# Patient Record
Sex: MALE | Race: BLACK OR AFRICAN AMERICAN | Employment: UNEMPLOYED | URBAN - METROPOLITAN AREA
[De-identification: names, ages, dates, MRNs, and addresses within clinical notes are randomized per-mention and may not be internally consistent; named-entity substitution may affect disease eponyms.]

---

## 2017-01-30 ENCOUNTER — OFFICE VISIT (OUTPATIENT)
Dept: INTERNAL MEDICINE CLINIC | Age: 62
End: 2017-01-30

## 2017-01-30 VITALS
SYSTOLIC BLOOD PRESSURE: 124 MMHG | OXYGEN SATURATION: 99 % | DIASTOLIC BLOOD PRESSURE: 80 MMHG | BODY MASS INDEX: 30.38 KG/M2 | HEART RATE: 92 BPM | WEIGHT: 194 LBS

## 2017-01-30 DIAGNOSIS — H00.011 HORDEOLUM EXTERNUM OF RIGHT UPPER EYELID: ICD-10-CM

## 2017-01-30 DIAGNOSIS — H65.23 BILATERAL CHRONIC SEROUS OTITIS MEDIA: Primary | ICD-10-CM

## 2017-01-30 DIAGNOSIS — H93.11 RIGHT-SIDED TINNITUS: ICD-10-CM

## 2017-01-30 DIAGNOSIS — Z12.11 COLON CANCER SCREENING: ICD-10-CM

## 2017-01-30 DIAGNOSIS — Z13.9 SCREENING: ICD-10-CM

## 2017-01-30 LAB
A/G RATIO: 1.7 (ref 1.1–2.2)
ALBUMIN SERPL-MCNC: 4.5 G/DL (ref 3.4–5)
ALP BLD-CCNC: 111 U/L (ref 40–129)
ALT SERPL-CCNC: 38 U/L (ref 10–40)
ANION GAP SERPL CALCULATED.3IONS-SCNC: 16 MMOL/L (ref 3–16)
AST SERPL-CCNC: 23 U/L (ref 15–37)
BILIRUB SERPL-MCNC: 0.5 MG/DL (ref 0–1)
BUN BLDV-MCNC: 11 MG/DL (ref 7–20)
CALCIUM SERPL-MCNC: 9.4 MG/DL (ref 8.3–10.6)
CHLORIDE BLD-SCNC: 103 MMOL/L (ref 99–110)
CHOLESTEROL, TOTAL: 187 MG/DL (ref 0–199)
CO2: 25 MMOL/L (ref 21–32)
CREAT SERPL-MCNC: 1.1 MG/DL (ref 0.8–1.3)
GFR AFRICAN AMERICAN: >60
GFR NON-AFRICAN AMERICAN: >60
GLOBULIN: 2.7 G/DL
GLUCOSE BLD-MCNC: 93 MG/DL (ref 70–99)
HDLC SERPL-MCNC: 45 MG/DL (ref 40–60)
HEPATITIS C ANTIBODY INTERPRETATION: NORMAL
LDL CHOLESTEROL CALCULATED: 115 MG/DL
POTASSIUM SERPL-SCNC: 4.4 MMOL/L (ref 3.5–5.1)
SODIUM BLD-SCNC: 144 MMOL/L (ref 136–145)
TOTAL PROTEIN: 7.2 G/DL (ref 6.4–8.2)
TRIGL SERPL-MCNC: 136 MG/DL (ref 0–150)
VLDLC SERPL CALC-MCNC: 27 MG/DL

## 2017-01-30 PROCEDURE — 99214 OFFICE O/P EST MOD 30 MIN: CPT | Performed by: INTERNAL MEDICINE

## 2017-01-30 RX ORDER — AMOXICILLIN AND CLAVULANATE POTASSIUM 875; 125 MG/1; MG/1
1 TABLET, FILM COATED ORAL 2 TIMES DAILY
Qty: 20 TABLET | Refills: 0 | Status: SHIPPED | OUTPATIENT
Start: 2017-01-30 | End: 2017-02-09

## 2017-01-30 ASSESSMENT — ENCOUNTER SYMPTOMS
VISUAL CHANGE: 0
NAUSEA: 0

## 2017-01-31 LAB — HIV-1 AND HIV-2 ANTIBODIES: NORMAL

## 2017-02-20 ENCOUNTER — OFFICE VISIT (OUTPATIENT)
Dept: INTERNAL MEDICINE CLINIC | Age: 62
End: 2017-02-20

## 2017-02-20 VITALS
SYSTOLIC BLOOD PRESSURE: 126 MMHG | OXYGEN SATURATION: 98 % | HEART RATE: 80 BPM | DIASTOLIC BLOOD PRESSURE: 84 MMHG | WEIGHT: 196 LBS | BODY MASS INDEX: 30.7 KG/M2

## 2017-02-20 DIAGNOSIS — H93.8X3 EAR FULLNESS, BILATERAL: Primary | ICD-10-CM

## 2017-02-20 PROCEDURE — 99213 OFFICE O/P EST LOW 20 MIN: CPT | Performed by: INTERNAL MEDICINE

## 2017-03-06 ENCOUNTER — TELEPHONE (OUTPATIENT)
Dept: INTERNAL MEDICINE CLINIC | Age: 62
End: 2017-03-06

## 2017-03-07 RX ORDER — AMOXICILLIN 875 MG/1
875 TABLET, COATED ORAL 2 TIMES DAILY
Qty: 14 TABLET | Refills: 0 | Status: SHIPPED | OUTPATIENT
Start: 2017-03-07 | End: 2017-03-14

## 2017-03-23 ENCOUNTER — OFFICE VISIT (OUTPATIENT)
Dept: INTERNAL MEDICINE CLINIC | Age: 62
End: 2017-03-23

## 2017-03-23 VITALS
HEART RATE: 97 BPM | OXYGEN SATURATION: 99 % | WEIGHT: 197 LBS | DIASTOLIC BLOOD PRESSURE: 78 MMHG | BODY MASS INDEX: 30.85 KG/M2 | SYSTOLIC BLOOD PRESSURE: 128 MMHG

## 2017-03-23 DIAGNOSIS — H65.21 RIGHT CHRONIC SEROUS OTITIS MEDIA: Primary | ICD-10-CM

## 2017-03-23 DIAGNOSIS — Z12.11 COLON CANCER SCREENING: ICD-10-CM

## 2017-03-23 PROCEDURE — 92551 PURE TONE HEARING TEST AIR: CPT | Performed by: INTERNAL MEDICINE

## 2017-03-23 PROCEDURE — 99213 OFFICE O/P EST LOW 20 MIN: CPT | Performed by: INTERNAL MEDICINE

## 2017-03-27 ENCOUNTER — TELEPHONE (OUTPATIENT)
Dept: INTERNAL MEDICINE CLINIC | Age: 62
End: 2017-03-27

## 2017-05-02 ENCOUNTER — OFFICE VISIT (OUTPATIENT)
Dept: ENT CLINIC | Age: 62
End: 2017-05-02

## 2017-05-02 VITALS
HEART RATE: 87 BPM | HEIGHT: 68 IN | WEIGHT: 190 LBS | BODY MASS INDEX: 28.79 KG/M2 | SYSTOLIC BLOOD PRESSURE: 122 MMHG | DIASTOLIC BLOOD PRESSURE: 77 MMHG

## 2017-05-02 DIAGNOSIS — H93.A1 PULSATILE TINNITUS OF RIGHT EAR: Primary | ICD-10-CM

## 2017-05-02 PROCEDURE — 99243 OFF/OP CNSLTJ NEW/EST LOW 30: CPT | Performed by: OTOLARYNGOLOGY

## 2017-05-26 ENCOUNTER — TELEPHONE (OUTPATIENT)
Dept: ENT CLINIC | Age: 62
End: 2017-05-26

## 2017-05-26 DIAGNOSIS — H93.A9 PULSATILE TINNITUS: Primary | ICD-10-CM

## 2017-05-31 ENCOUNTER — HOSPITAL ENCOUNTER (OUTPATIENT)
Dept: MRI IMAGING | Age: 62
Discharge: HOME | End: 2017-05-31
Attending: OTOLARYNGOLOGY | Admitting: OTOLARYNGOLOGY

## 2017-05-31 DIAGNOSIS — H93.13 TINNITUS OF BOTH EARS: ICD-10-CM

## 2017-05-31 DIAGNOSIS — H93.A1 PULSATILE TINNITUS OF RIGHT EAR: ICD-10-CM

## 2017-05-31 LAB
BUN BLDV-MCNC: 10 MG/DL (ref 7–20)
CREAT SERPL-MCNC: 1.1 MG/DL (ref 0.8–1.3)
GFR AFRICAN AMERICAN: >60
GFR NON-AFRICAN AMERICAN: >60

## 2017-05-31 RX ORDER — SODIUM CHLORIDE 0.9 % (FLUSH) 0.9 %
10 SYRINGE (ML) INJECTION ONCE
Status: COMPLETED | OUTPATIENT
Start: 2017-05-31 | End: 2017-05-31

## 2017-05-31 RX ADMIN — Medication 10 ML: at 13:05

## 2017-06-23 ENCOUNTER — OFFICE VISIT (OUTPATIENT)
Dept: INTERNAL MEDICINE CLINIC | Age: 62
End: 2017-06-23

## 2017-06-23 VITALS
DIASTOLIC BLOOD PRESSURE: 72 MMHG | SYSTOLIC BLOOD PRESSURE: 128 MMHG | WEIGHT: 185 LBS | OXYGEN SATURATION: 98 % | BODY MASS INDEX: 28.13 KG/M2 | HEART RATE: 90 BPM

## 2017-06-23 DIAGNOSIS — J34.9 SINUS DISEASE: ICD-10-CM

## 2017-06-23 DIAGNOSIS — H93.A1 PULSATILE TINNITUS OF RIGHT EAR: Primary | ICD-10-CM

## 2017-06-23 PROCEDURE — 99213 OFFICE O/P EST LOW 20 MIN: CPT | Performed by: INTERNAL MEDICINE

## 2017-06-23 ASSESSMENT — ENCOUNTER SYMPTOMS
VOMITING: 0
EYE PAIN: 0
STRIDOR: 0
WHEEZING: 0
CONSTIPATION: 0
PHOTOPHOBIA: 0
BLOOD IN STOOL: 0
NAUSEA: 0
EYE REDNESS: 0
SORE THROAT: 0
CHEST TIGHTNESS: 0
FACIAL SWELLING: 0
EYE DISCHARGE: 0
RESPIRATORY NEGATIVE: 1
RHINORRHEA: 0
ALLERGIC/IMMUNOLOGIC NEGATIVE: 1
ABDOMINAL PAIN: 0
SINUS PRESSURE: 0
ANAL BLEEDING: 0
DIARRHEA: 0
BACK PAIN: 0
ABDOMINAL DISTENTION: 0
EYE ITCHING: 0
TROUBLE SWALLOWING: 0
SHORTNESS OF BREATH: 0
APNEA: 0
VOICE CHANGE: 0
COUGH: 0

## 2017-07-17 ENCOUNTER — OFFICE VISIT (OUTPATIENT)
Dept: ENT CLINIC | Age: 62
End: 2017-07-17

## 2017-07-17 VITALS
BODY MASS INDEX: 28.79 KG/M2 | WEIGHT: 190 LBS | HEIGHT: 68 IN | DIASTOLIC BLOOD PRESSURE: 84 MMHG | HEART RATE: 81 BPM | SYSTOLIC BLOOD PRESSURE: 135 MMHG

## 2017-07-17 DIAGNOSIS — H93.A1 PULSATILE TINNITUS OF RIGHT EAR: Primary | ICD-10-CM

## 2017-07-17 PROCEDURE — 99213 OFFICE O/P EST LOW 20 MIN: CPT | Performed by: OTOLARYNGOLOGY

## 2017-07-25 ENCOUNTER — OFFICE VISIT (OUTPATIENT)
Dept: ENT CLINIC | Age: 62
End: 2017-07-25

## 2017-07-25 DIAGNOSIS — H93.11 RIGHT-SIDED TINNITUS: Primary | ICD-10-CM

## 2017-07-25 PROCEDURE — 92550 TYMPANOMETRY & REFLEX THRESH: CPT | Performed by: AUDIOLOGIST

## 2017-07-25 PROCEDURE — 92557 COMPREHENSIVE HEARING TEST: CPT | Performed by: AUDIOLOGIST

## 2017-08-21 ENCOUNTER — HOSPITAL ENCOUNTER (OUTPATIENT)
Dept: MRI IMAGING | Age: 62
Discharge: HOME | End: 2017-08-21
Admitting: OTOLARYNGOLOGY

## 2017-08-21 DIAGNOSIS — H93.11 TINNITUS OF RIGHT EAR: ICD-10-CM

## 2017-08-21 DIAGNOSIS — H93.A1 PULSATILE TINNITUS OF RIGHT EAR: ICD-10-CM

## 2017-12-21 ENCOUNTER — TELEPHONE (OUTPATIENT)
Dept: ENT CLINIC | Age: 62
End: 2017-12-21

## 2017-12-21 NOTE — TELEPHONE ENCOUNTER
Patient last seen 7/17 -- Had audio done on 7/25 -- Had MRA done on 8/21 -- Patient seen for Pulsatile Tinnitus (r) ear    Patient states that he is still having this issue and would like to know what test results show. Patient scheduled follow up appointment for 1/26/18 but would like results and recommendations.     Patient @ [phone]    Also advised that I would place on wait list for sooner appointment

## 2017-12-22 NOTE — TELEPHONE ENCOUNTER
Left vm that Dr. Manny Callahan ask that we call with results - patient to contact office for results

## 2018-01-25 ENCOUNTER — OFFICE VISIT (OUTPATIENT)
Dept: INTERNAL MEDICINE CLINIC | Age: 63
End: 2018-01-25

## 2018-01-25 VITALS
HEART RATE: 90 BPM | BODY MASS INDEX: 29.04 KG/M2 | OXYGEN SATURATION: 98 % | SYSTOLIC BLOOD PRESSURE: 118 MMHG | DIASTOLIC BLOOD PRESSURE: 74 MMHG | WEIGHT: 191 LBS

## 2018-01-25 DIAGNOSIS — Z12.11 COLON CANCER SCREENING: ICD-10-CM

## 2018-01-25 DIAGNOSIS — H93.11 RIGHT-SIDED TINNITUS: Primary | ICD-10-CM

## 2018-01-25 DIAGNOSIS — Z12.5 SCREENING PSA (PROSTATE SPECIFIC ANTIGEN): ICD-10-CM

## 2018-01-25 DIAGNOSIS — Z13.220 SCREENING CHOLESTEROL LEVEL: ICD-10-CM

## 2018-01-25 PROCEDURE — 99214 OFFICE O/P EST MOD 30 MIN: CPT | Performed by: INTERNAL MEDICINE

## 2018-01-26 ENCOUNTER — OFFICE VISIT (OUTPATIENT)
Dept: ENT CLINIC | Age: 63
End: 2018-01-26

## 2018-01-26 VITALS — HEART RATE: 85 BPM | DIASTOLIC BLOOD PRESSURE: 78 MMHG | SYSTOLIC BLOOD PRESSURE: 128 MMHG

## 2018-01-26 DIAGNOSIS — H90.3 BILATERAL HIGH FREQUENCY SENSORINEURAL HEARING LOSS: ICD-10-CM

## 2018-01-26 DIAGNOSIS — H93.11 RIGHT-SIDED TINNITUS: Primary | Chronic | ICD-10-CM

## 2018-01-26 DIAGNOSIS — H93.A1 PULSATILE TINNITUS OF RIGHT EAR: Chronic | ICD-10-CM

## 2018-01-26 PROCEDURE — 99214 OFFICE O/P EST MOD 30 MIN: CPT | Performed by: OTOLARYNGOLOGY

## 2018-01-26 ASSESSMENT — ENCOUNTER SYMPTOMS
COUGH: 0
CHOKING: 0
EYE PAIN: 0
EYE ITCHING: 0

## 2018-01-26 NOTE — PROGRESS NOTES
HISTORY:   ORDERING PHYSICIAN PROVIDED HISTORY: Pulsatile tinnitus of right ear   TECHNOLOGIST PROVIDED HISTORY:   Technologist Provided Reason for Exam: PT HAVING MRA HEAD AND NECK WITHOUT   CONTRAST ONLY TODAY   Acuity: Acute   Type of Encounter: Initial   Additional signs and symptoms: PT HAVING MRA HEAD AND NECK WITHOUT CONTRAST   ONLY TODAY   Relevant Medical/Surgical History: PT HAVING MRA HEAD AND NECK WITHOUT   CONTRAST ONLY TODAY       FINDINGS:   AORTIC ARCH/GREAT VESSELS: The aortic arch demonstrates three-vessel origin. There is no gross evidence of occlusion within limitations of motion artifact.       CAROTID ARTERIES: The common carotid arteries are normal in appearance   without evidence of a flow limiting stenosis. The internal carotid arteries   are normal in appearance without evidence of a flow limiting stenosis by   NASCET criteria.       VERTEBRAL ARTERIES: The vertebral arteries both arise from the subclavian   arteries and are normal in caliber without evidence of flow limiting stenosis.         Impression   Unremarkable exam.  No evidence of flow-limiting stenosis or vascular lesion.             COUNSELING:    Audiogram results were reviewed and discussed with Magdi. I advised of type of hearing loss, probable etiology, possible associated impairment, need for noise protection and avoidance, etiology of tinnitus and treatment/coping measures including masking techniques, and need for annual and prn hearing tests. Patient was advised of the difficulty understanding speech in the presence of moderate to high volume background noise associated with this hearing loss. IMPRESSION / Glenys Delgado / Marifer Bro / PROCEDURES:       Cherie Gomes was seen today for tinnitus.     Diagnoses and all orders for this visit:    Right-sided tinnitus  Comments:  no evidence of acoustic neuroma    Pulsatile tinnitus of right ear  Comments:  no evidence of glomus vagale, jugulare, or tympanicum; no evidence of vascular tumor or malformation or aneurysm. Bilateral high frequency sensorineural hearing loss  Comments:  mild, bilateral, symmetrical         RECOMMENDATIONS / PLAN:    1. See Patient Instructions  2. Return for Annual hearing test, or any further Ear Nose Throat or Sinus problems. TIME:  I spent a total of 25 minutes with this patient; counseling time = 20 minutes. More than 50% of the visit was spent counseling and/or coordination of care.

## 2018-01-26 NOTE — PROGRESS NOTES
He has no wheezes. He has no rales. Abdominal: Soft. He exhibits no distension. Genitourinary: Rectal exam shows no fissure, no mass and no tenderness. Prostate is enlarged. Prostate is not tender. Assessment/Plan:  Albert Gaspar was seen today for annual exam.    Diagnoses and all orders for this visit:    Right-sided tinnitus    Colon cancer screening  -     Ambulatory referral to Colonoscopy; Future  -     polyethylene glycol (GOLYTELY) 236 g solution; Take 4,000 mLs by mouth once for 1 dose    Screening cholesterol level  -     Comprehensive Metabolic Panel; Future  -     Lipid Panel; Future    Screening PSA (prostate specific antigen)  -     PSA PROSTATIC SPECIFIC ANTIGEN; Future  -     URINALYSIS WITH MICROSCOPIC; Future      Return in about 6 months (around 7/25/2018) for tinnitus.

## 2018-03-06 ENCOUNTER — TELEPHONE (OUTPATIENT)
Dept: SURGERY | Age: 63
End: 2018-03-06

## 2018-03-08 NOTE — TELEPHONE ENCOUNTER
Patient is requesting a return call to schedule a colonoscopy. Patient states he would like it scheduled sooner rather than later. Please advise. Thank you!     Thomas Carter [phone] (home)

## 2018-03-09 DIAGNOSIS — Z12.11 COLON CANCER SCREENING: Primary | ICD-10-CM

## 2018-03-13 ENCOUNTER — TELEPHONE (OUTPATIENT)
Dept: SURGERY | Age: 63
End: 2018-03-13

## 2018-03-14 RX ORDER — SODIUM CHLORIDE 0.9 % (FLUSH) 0.9 %
10 SYRINGE (ML) INJECTION EVERY 12 HOURS SCHEDULED
Status: CANCELLED | OUTPATIENT
Start: 2018-03-14

## 2018-03-14 RX ORDER — SODIUM CHLORIDE 0.9 % (FLUSH) 0.9 %
10 SYRINGE (ML) INJECTION PRN
Status: CANCELLED | OUTPATIENT
Start: 2018-03-14

## 2018-03-14 RX ORDER — SODIUM CHLORIDE 9 MG/ML
INJECTION, SOLUTION INTRAVENOUS CONTINUOUS
Status: CANCELLED | OUTPATIENT
Start: 2018-03-14

## 2018-03-15 NOTE — TELEPHONE ENCOUNTER
Patient currently not covered under spouse's insurance plan. Colonoscopy rescheduled for 5/4/18 @ 1030. [facility] scheduling informed.

## 2018-03-16 ENCOUNTER — HOSPITAL ENCOUNTER (OUTPATIENT)
Dept: ENDOSCOPY | Age: 63
Discharge: HOME | End: 2018-03-17
Attending: SURGERY | Admitting: SURGERY

## 2018-05-04 ENCOUNTER — HOSPITAL ENCOUNTER (OUTPATIENT)
Dept: ENDOSCOPY | Age: 63
Discharge: HOME | End: 2018-05-04
Admitting: SURGERY

## 2018-05-04 VITALS
OXYGEN SATURATION: 100 % | HEIGHT: 67 IN | SYSTOLIC BLOOD PRESSURE: 113 MMHG | RESPIRATION RATE: 15 BRPM | TEMPERATURE: 98.3 F | WEIGHT: 185 LBS | DIASTOLIC BLOOD PRESSURE: 71 MMHG | BODY MASS INDEX: 29.03 KG/M2 | HEART RATE: 77 BPM

## 2018-05-04 PROCEDURE — 45378 DIAGNOSTIC COLONOSCOPY: CPT | Performed by: SURGERY

## 2018-05-04 RX ORDER — SODIUM CHLORIDE 0.9 % (FLUSH) 0.9 %
10 SYRINGE (ML) INJECTION PRN
Status: DISCONTINUED | OUTPATIENT
Start: 2018-05-04 | End: 2018-05-05 | Stop reason: HOSPADM

## 2018-05-04 RX ORDER — SODIUM CHLORIDE 9 MG/ML
INJECTION, SOLUTION INTRAVENOUS CONTINUOUS
Status: DISCONTINUED | OUTPATIENT
Start: 2018-05-04 | End: 2018-05-05 | Stop reason: HOSPADM

## 2018-05-04 RX ORDER — SODIUM CHLORIDE 0.9 % (FLUSH) 0.9 %
10 SYRINGE (ML) INJECTION EVERY 12 HOURS SCHEDULED
Status: DISCONTINUED | OUTPATIENT
Start: 2018-05-04 | End: 2018-05-05 | Stop reason: HOSPADM

## 2018-05-04 RX ADMIN — SODIUM CHLORIDE: 9 INJECTION, SOLUTION INTRAVENOUS at 09:43

## 2018-05-04 ASSESSMENT — ENCOUNTER SYMPTOMS: SHORTNESS OF BREATH: 0

## 2018-05-04 ASSESSMENT — PAIN - FUNCTIONAL ASSESSMENT: PAIN_FUNCTIONAL_ASSESSMENT: 0-10

## 2019-01-15 ENCOUNTER — OFFICE VISIT (OUTPATIENT)
Dept: INTERNAL MEDICINE CLINIC | Age: 64
End: 2019-01-15
Payer: MEDICAID

## 2019-01-15 VITALS
WEIGHT: 187 LBS | HEART RATE: 80 BPM | BODY MASS INDEX: 29.29 KG/M2 | DIASTOLIC BLOOD PRESSURE: 82 MMHG | SYSTOLIC BLOOD PRESSURE: 122 MMHG

## 2019-01-15 DIAGNOSIS — D22.9 CHANGE IN MOLE: ICD-10-CM

## 2019-01-15 DIAGNOSIS — M79.10 MYALGIA: Primary | ICD-10-CM

## 2019-01-15 DIAGNOSIS — R35.0 URINARY FREQUENCY: ICD-10-CM

## 2019-01-15 LAB
A/G RATIO: 1.6 (ref 1.1–2.2)
ALBUMIN SERPL-MCNC: 4.3 G/DL (ref 3.4–5)
ALP BLD-CCNC: 110 U/L (ref 40–129)
ALT SERPL-CCNC: 19 U/L (ref 10–40)
ANION GAP SERPL CALCULATED.3IONS-SCNC: 12 MMOL/L (ref 3–16)
AST SERPL-CCNC: 17 U/L (ref 15–37)
BASOPHILS ABSOLUTE: 0 K/UL (ref 0–0.2)
BASOPHILS RELATIVE PERCENT: 0.8 %
BILIRUB SERPL-MCNC: 0.4 MG/DL (ref 0–1)
BUN BLDV-MCNC: 13 MG/DL (ref 7–20)
C-REACTIVE PROTEIN: 5.2 MG/L (ref 0–5.1)
CALCIUM SERPL-MCNC: 9.4 MG/DL (ref 8.3–10.6)
CHLORIDE BLD-SCNC: 106 MMOL/L (ref 99–110)
CO2: 26 MMOL/L (ref 21–32)
CREAT SERPL-MCNC: 1 MG/DL (ref 0.8–1.3)
EOSINOPHILS ABSOLUTE: 0 K/UL (ref 0–0.6)
EOSINOPHILS RELATIVE PERCENT: 0.7 %
FERRITIN: 73.5 NG/ML (ref 30–400)
GFR AFRICAN AMERICAN: >60
GFR NON-AFRICAN AMERICAN: >60
GLOBULIN: 2.7 G/DL
GLUCOSE BLD-MCNC: 150 MG/DL (ref 70–99)
HCT VFR BLD CALC: 41.1 % (ref 40.5–52.5)
HEMOGLOBIN: 13.6 G/DL (ref 13.5–17.5)
LYMPHOCYTES ABSOLUTE: 1.9 K/UL (ref 1–5.1)
LYMPHOCYTES RELATIVE PERCENT: 32.6 %
MCH RBC QN AUTO: 30.8 PG (ref 26–34)
MCHC RBC AUTO-ENTMCNC: 33.2 G/DL (ref 31–36)
MCV RBC AUTO: 92.7 FL (ref 80–100)
MONOCYTES ABSOLUTE: 0.4 K/UL (ref 0–1.3)
MONOCYTES RELATIVE PERCENT: 7.2 %
NEUTROPHILS ABSOLUTE: 3.4 K/UL (ref 1.7–7.7)
NEUTROPHILS RELATIVE PERCENT: 58.7 %
PDW BLD-RTO: 13.8 % (ref 12.4–15.4)
PLATELET # BLD: 280 K/UL (ref 135–450)
PMV BLD AUTO: 8.5 FL (ref 5–10.5)
POTASSIUM SERPL-SCNC: 4.3 MMOL/L (ref 3.5–5.1)
PROSTATE SPECIFIC ANTIGEN: 4.3 NG/ML (ref 0–4)
RBC # BLD: 4.44 M/UL (ref 4.2–5.9)
SEDIMENTATION RATE, ERYTHROCYTE: 28 MM/HR (ref 0–20)
SODIUM BLD-SCNC: 144 MMOL/L (ref 136–145)
TOTAL PROTEIN: 7 G/DL (ref 6.4–8.2)
TSH SERPL DL<=0.05 MIU/L-ACNC: 0.86 UIU/ML (ref 0.27–4.2)
WBC # BLD: 5.8 K/UL (ref 4–11)

## 2019-01-15 PROCEDURE — 99213 OFFICE O/P EST LOW 20 MIN: CPT | Performed by: INTERNAL MEDICINE

## 2019-01-16 LAB
ESTIMATED AVERAGE GLUCOSE: 134.1 MG/DL
HBA1C MFR BLD: 6.3 %

## 2019-01-16 ASSESSMENT — ENCOUNTER SYMPTOMS
COUGH: 0
EYE PAIN: 0
CHOKING: 0
EYE REDNESS: 0

## 2019-01-24 ENCOUNTER — TELEPHONE (OUTPATIENT)
Dept: INTERNAL MEDICINE CLINIC | Age: 64
End: 2019-01-24

## 2019-01-31 ENCOUNTER — OFFICE VISIT (OUTPATIENT)
Dept: INTERNAL MEDICINE CLINIC | Age: 64
End: 2019-01-31
Payer: MEDICAID

## 2019-01-31 VITALS
SYSTOLIC BLOOD PRESSURE: 120 MMHG | DIASTOLIC BLOOD PRESSURE: 70 MMHG | WEIGHT: 190 LBS | HEART RATE: 76 BPM | BODY MASS INDEX: 29.76 KG/M2

## 2019-01-31 DIAGNOSIS — R97.20 ELEVATED PSA: ICD-10-CM

## 2019-01-31 DIAGNOSIS — R20.0 BILATERAL HAND NUMBNESS: ICD-10-CM

## 2019-01-31 DIAGNOSIS — N50.82 SCROTAL PAIN: Primary | ICD-10-CM

## 2019-01-31 PROCEDURE — 99213 OFFICE O/P EST LOW 20 MIN: CPT | Performed by: INTERNAL MEDICINE

## 2019-02-04 ENCOUNTER — PROCEDURE VISIT (OUTPATIENT)
Dept: NEUROLOGY | Age: 64
End: 2019-02-04
Payer: MEDICAID

## 2019-02-04 DIAGNOSIS — G56.23 ULNAR NEUROPATHY OF BOTH UPPER EXTREMITIES: Primary | ICD-10-CM

## 2019-02-04 PROCEDURE — 95886 MUSC TEST DONE W/N TEST COMP: CPT | Performed by: PSYCHIATRY & NEUROLOGY

## 2019-02-04 PROCEDURE — 95911 NRV CNDJ TEST 9-10 STUDIES: CPT | Performed by: PSYCHIATRY & NEUROLOGY

## 2019-02-08 ENCOUNTER — TELEPHONE (OUTPATIENT)
Dept: INTERNAL MEDICINE CLINIC | Age: 64
End: 2019-02-08

## 2019-02-28 ENCOUNTER — OFFICE VISIT (OUTPATIENT)
Dept: INTERNAL MEDICINE CLINIC | Age: 64
End: 2019-02-28
Payer: MEDICAID

## 2019-02-28 VITALS
WEIGHT: 184 LBS | DIASTOLIC BLOOD PRESSURE: 70 MMHG | OXYGEN SATURATION: 97 % | SYSTOLIC BLOOD PRESSURE: 130 MMHG | HEART RATE: 88 BPM | HEIGHT: 66 IN | BODY MASS INDEX: 29.57 KG/M2

## 2019-02-28 DIAGNOSIS — M25.561 ACUTE PAIN OF RIGHT KNEE: ICD-10-CM

## 2019-02-28 DIAGNOSIS — M25.562 ACUTE PAIN OF LEFT KNEE: ICD-10-CM

## 2019-02-28 DIAGNOSIS — G56.23 ULNAR NEUROPATHY OF BOTH UPPER EXTREMITIES: Primary | ICD-10-CM

## 2019-02-28 PROCEDURE — 3017F COLORECTAL CA SCREEN DOC REV: CPT | Performed by: INTERNAL MEDICINE

## 2019-02-28 PROCEDURE — G8427 DOCREV CUR MEDS BY ELIG CLIN: HCPCS | Performed by: INTERNAL MEDICINE

## 2019-02-28 PROCEDURE — G8484 FLU IMMUNIZE NO ADMIN: HCPCS | Performed by: INTERNAL MEDICINE

## 2019-02-28 PROCEDURE — 99213 OFFICE O/P EST LOW 20 MIN: CPT | Performed by: INTERNAL MEDICINE

## 2019-02-28 PROCEDURE — G8419 CALC BMI OUT NRM PARAM NOF/U: HCPCS | Performed by: INTERNAL MEDICINE

## 2019-02-28 PROCEDURE — 1036F TOBACCO NON-USER: CPT | Performed by: INTERNAL MEDICINE

## 2019-02-28 ASSESSMENT — PATIENT HEALTH QUESTIONNAIRE - PHQ9
SUM OF ALL RESPONSES TO PHQ QUESTIONS 1-9: 0
1. LITTLE INTEREST OR PLEASURE IN DOING THINGS: 0
SUM OF ALL RESPONSES TO PHQ QUESTIONS 1-9: 0
2. FEELING DOWN, DEPRESSED OR HOPELESS: 0
SUM OF ALL RESPONSES TO PHQ9 QUESTIONS 1 & 2: 0

## 2019-12-10 ENCOUNTER — TELEPHONE (OUTPATIENT)
Dept: PRIMARY CARE CLINIC | Age: 64
End: 2019-12-10

## 2019-12-10 DIAGNOSIS — Z13.220 SCREENING FOR CHOLESTEROL LEVEL: Primary | ICD-10-CM

## 2019-12-10 DIAGNOSIS — Z12.5 SCREENING PSA (PROSTATE SPECIFIC ANTIGEN): ICD-10-CM

## 2020-01-17 ENCOUNTER — OFFICE VISIT (OUTPATIENT)
Dept: INTERNAL MEDICINE CLINIC | Age: 65
End: 2020-01-17
Payer: MEDICAID

## 2020-01-17 VITALS
OXYGEN SATURATION: 98 % | SYSTOLIC BLOOD PRESSURE: 122 MMHG | BODY MASS INDEX: 29.92 KG/M2 | WEIGHT: 184 LBS | HEART RATE: 77 BPM | DIASTOLIC BLOOD PRESSURE: 86 MMHG

## 2020-01-17 DIAGNOSIS — Z13.220 SCREENING FOR CHOLESTEROL LEVEL: ICD-10-CM

## 2020-01-17 DIAGNOSIS — Z12.5 SCREENING PSA (PROSTATE SPECIFIC ANTIGEN): ICD-10-CM

## 2020-01-17 LAB
A/G RATIO: 1.5 (ref 1.1–2.2)
ALBUMIN SERPL-MCNC: 4.5 G/DL (ref 3.4–5)
ALP BLD-CCNC: 104 U/L (ref 40–129)
ALT SERPL-CCNC: 22 U/L (ref 10–40)
ANION GAP SERPL CALCULATED.3IONS-SCNC: 13 MMOL/L (ref 3–16)
AST SERPL-CCNC: 18 U/L (ref 15–37)
BILIRUB SERPL-MCNC: 0.8 MG/DL (ref 0–1)
BUN BLDV-MCNC: 12 MG/DL (ref 7–20)
CALCIUM SERPL-MCNC: 9.7 MG/DL (ref 8.3–10.6)
CHLORIDE BLD-SCNC: 103 MMOL/L (ref 99–110)
CHOLESTEROL, TOTAL: 199 MG/DL (ref 0–199)
CO2: 27 MMOL/L (ref 21–32)
CREAT SERPL-MCNC: 1 MG/DL (ref 0.8–1.3)
GFR AFRICAN AMERICAN: >60
GFR NON-AFRICAN AMERICAN: >60
GLOBULIN: 3 G/DL
GLUCOSE BLD-MCNC: 103 MG/DL (ref 70–99)
HDLC SERPL-MCNC: 51 MG/DL (ref 40–60)
LDL CHOLESTEROL CALCULATED: 132 MG/DL
POTASSIUM SERPL-SCNC: 4.7 MMOL/L (ref 3.5–5.1)
PROSTATE SPECIFIC ANTIGEN: 6.07 NG/ML (ref 0–4)
SODIUM BLD-SCNC: 143 MMOL/L (ref 136–145)
TOTAL PROTEIN: 7.5 G/DL (ref 6.4–8.2)
TRIGL SERPL-MCNC: 81 MG/DL (ref 0–150)
VLDLC SERPL CALC-MCNC: 16 MG/DL

## 2020-01-17 PROCEDURE — G8484 FLU IMMUNIZE NO ADMIN: HCPCS | Performed by: INTERNAL MEDICINE

## 2020-01-17 PROCEDURE — 99396 PREV VISIT EST AGE 40-64: CPT | Performed by: INTERNAL MEDICINE

## 2020-01-17 RX ORDER — LATANOPROST 50 UG/ML
1 SOLUTION/ DROPS OPHTHALMIC NIGHTLY
COMMUNITY
Start: 2020-01-06

## 2020-01-17 ASSESSMENT — ENCOUNTER SYMPTOMS
CHOKING: 0
EYE REDNESS: 0
EYE PAIN: 0
COUGH: 0

## 2020-01-17 ASSESSMENT — PATIENT HEALTH QUESTIONNAIRE - PHQ9
SUM OF ALL RESPONSES TO PHQ QUESTIONS 1-9: 0
SUM OF ALL RESPONSES TO PHQ QUESTIONS 1-9: 0
2. FEELING DOWN, DEPRESSED OR HOPELESS: 0
1. LITTLE INTEREST OR PLEASURE IN DOING THINGS: 0
SUM OF ALL RESPONSES TO PHQ9 QUESTIONS 1 & 2: 0

## 2020-01-17 NOTE — PATIENT INSTRUCTIONS
Patient Education        Plantar Fasciitis: Care Instructions  Your Care Instructions    Plantar fasciitis is pain and inflammation of the plantar fascia, the tissue at the bottom of your foot that connects the heel bone to the toes. The plantar fascia also supports the arch. If you strain the plantar fascia, it can develop small tears and cause heel pain when you stand or walk. Plantar fasciitis can be caused by running or other sports. It also may occur in people who are overweight or who have high arches or flat feet. You may get plantar fasciitis if you walk or stand for long periods, or have a tight Achilles tendon or calf muscles. You can improve your foot pain with rest and other care at home. It might take a few weeks to a few months for your foot to heal completely. Follow-up care is a key part of your treatment and safety. Be sure to make and go to all appointments, and call your doctor if you are having problems. It's also a good idea to know your test results and keep a list of the medicines you take. How can you care for yourself at home? · Rest your feet often. Reduce your activity to a level that lets you avoid pain. If possible, do not run or walk on hard surfaces. · Take pain medicines exactly as directed. ? If the doctor gave you a prescription medicine for pain, take it as prescribed. ? If you are not taking a prescription pain medicine, take an over-the-counter anti-inflammatory medicine for pain and swelling, such as ibuprofen (Advil, Motrin) or naproxen (Aleve). Read and follow all instructions on the label. · Use ice massage to help with pain and swelling. You can use an ice cube or an ice cup several times a day. To make an ice cup, fill a paper cup with water and freeze it. Cut off the top of the cup until a half-inch of ice shows. Hold onto the remaining paper to use the cup. Rub the ice in small circles over the area for 5 to 7 minutes.   · Contrast baths, which alternate hot and more than 2 weeks. Where can you learn more? Go to https://chpepiceweb.GREE. org and sign in to your [employer] account. Enter S620 in the [facility] box to learn more about \"Plantar Fasciitis: Care Instructions. \"     If you do not have an account, please click on the \"Sign Up Now\" link. Current as of: June 26, 2019  Content Version: 12.3  © 2006-2019 [employer]. Care instructions adapted under license by [facility] ([facility]). If you have questions about a medical condition or this instruction, always ask your healthcare professional. [address] any warranty or liability for your use of this information. Patient Education        Plantar Fasciitis: Exercises  Introduction  Here are some examples of exercises for you to try. The exercises may be suggested for a condition or for rehabilitation. Start each exercise slowly. Ease off the exercises if you start to have pain. You will be told when to start these exercises and which ones will work best for you. How to do the exercises  Towel stretch   1. Sit with your legs extended and knees straight. 2. Place a towel around your foot just under the toes. 3. Hold each end of the towel in each hand, with your hands above your knees. 4. Pull back with the towel so that your foot stretches toward you. 5. Hold the position for at least 15 to 30 seconds. 6. Repeat 2 to 4 times a session, up to 5 sessions a day. Calf stretch   1. Stand facing a wall with your hands on the wall at about eye level. Put the leg you want to stretch about a step behind your other leg. 2. Keeping your back heel on the floor, bend your front knee until you feel a stretch in the back leg. 3. Hold the stretch for 15 to 30 seconds. Repeat 2 to 4 times. Plantar fascia and calf stretch   1. Stand on a step as shown above. Be sure to hold on to the banister.   2. Slowly let your heels down over the edge of the step as you relax your calf muscles. You should feel a gentle stretch across the bottom of your foot and up the back of your leg to your knee. 3. Hold the stretch about 15 to 30 seconds, and then tighten your calf muscle a little to bring your heel back up to the level of the step. Repeat 2 to 4 times. Towel curls   1. While sitting, place your foot on a towel on the floor and scrunch the towel toward you with your toes. 2. Then, also using your toes, push the towel away from you. Banks pickups   1. Put marbles on the floor next to a cup.  2. Using your toes, try to lift the marbles up from the floor and put them in the cup. Follow-up care is a key part of your treatment and safety. Be sure to make and go to all appointments, and call your doctor if you are having problems. It's also a good idea to know your test results and keep a list of the medicines you take. Where can you learn more? Go to https://[employer]. org and sign in to your [employer] account. Enter H043 in the [employer] box to learn more about \"Plantar Fasciitis: Exercises. \"     If you do not have an account, please click on the \"Sign Up Now\" link. Current as of: June 26, 2019  Content Version: 12.3  © 1039-5560 Healthwise, Incorporated. Care instructions adapted under license by [facility] ([facility]). If you have questions about a medical condition or this instruction, always ask your healthcare professional. [place] [address] any warranty or liability for your use of this information.

## 2020-01-17 NOTE — PROGRESS NOTES
2020    Danna Schuster (:  1955) is a 59 y.o. male, here for a preventive medicine evaluation. Patient  Has no complaint except for an elevated psa. He was seen by urology who recommended observation  Of with follow-up psa. He was given tamsulosin but decided not to take it. He does reports some dizziness  And lightheadedness with exercise. Patient Active Problem List   Diagnosis    Hordeolum externum of right upper eyelid    Right-sided tinnitus    Bilateral chronic serous otitis media    Pulsatile tinnitus of right ear    Bilateral high frequency sensorineural hearing loss    Diverticulosis of large intestine without hemorrhage     Patient has not been to the dentist in the past year. Patent occassionaly texts and drives    Patient does wear his seatbelt when driving    Review of Systems   Constitutional: Negative for chills and diaphoresis. HENT: Negative for ear discharge and ear pain. Eyes: Negative for pain and redness. Respiratory: Negative for cough and choking. Cardiovascular: Negative for chest pain and leg swelling. Genitourinary: Negative for flank pain and frequency. Prior to Visit Medications    Medication Sig Taking? Authorizing Provider   latanoprost (XALATAN) 0.005 % ophthalmic solution   Historical Provider, MD        No Known Allergies    No past medical history on file.     Past Surgical History:   Procedure Laterality Date    COLONOSCOPY  2018       Social History     Socioeconomic History    Marital status:      Spouse name: Not on file    Number of children: Not on file    Years of education: Not on file    Highest education level: Not on file   Occupational History    Not on file   Social Needs    Financial resource strain: Not on file    Food insecurity:     Worry: Not on file     Inability: Not on file    Transportation needs:     Medical: Not on file     Non-medical: Not on file   Tobacco Use    Smoking status: Never Smoker  Smokeless tobacco: Never Used   Substance and Sexual Activity    Alcohol use: No     Alcohol/week: 0.8 standard drinks     Types: 1 Standard drinks or equivalent per week    Drug use: No    Sexual activity: Not on file   Lifestyle    Physical activity:     Days per week: Not on file     Minutes per session: Not on file    Stress: Not on file   Relationships    Social connections:     Talks on phone: Not on file     Gets together: Not on file     Attends Orthodoxy service: Not on file     Active member of club or organization: Not on file     Attends meetings of clubs or organizations: Not on file     Relationship status: Not on file    Intimate partner violence:     Fear of current or ex partner: Not on file     Emotionally abused: Not on file     Physically abused: Not on file     Forced sexual activity: Not on file   Other Topics Concern    Not on file   Social History Narrative    Not on file        Family History   Problem Relation Age of Onset    Heart Disease Paternal Uncle     High Blood Pressure Mother     Diabetes Mother     High Blood Pressure Father        ADVANCE DIRECTIVE: N, Not Received    Vitals:    01/17/20 1011   BP: 122/86   Site: Left Upper Arm   Position: Sitting   Cuff Size: Large Adult   Pulse: 77   SpO2: 98%   Weight: 184 lb (83.5 kg)     Estimated body mass index is 29.92 kg/m² as calculated from the following:    Height as of 2/28/19: 5' 5.75\" (1.67 m). Weight as of this encounter: 184 lb (83.5 kg). Physical Exam  Constitutional:       Appearance: Normal appearance. He is not ill-appearing. HENT:      Right Ear: Tympanic membrane, ear canal and external ear normal. There is no impacted cerumen. Left Ear: Tympanic membrane, ear canal and external ear normal.      Nose: Nose normal. No congestion or rhinorrhea. Mouth/Throat:      Mouth: Mucous membranes are moist.      Pharynx: Oropharynx is clear.  No oropharyngeal exudate or posterior oropharyngeal

## 2020-02-17 ENCOUNTER — HOSPITAL ENCOUNTER (OUTPATIENT)
Dept: NON INVASIVE DIAGNOSTICS | Age: 65
Discharge: HOME | End: 2020-02-17
Payer: MEDICAID

## 2020-02-17 PROCEDURE — 93017 CV STRESS TEST TRACING ONLY: CPT | Performed by: INTERNAL MEDICINE

## 2020-03-04 ENCOUNTER — NURSE TRIAGE (OUTPATIENT)
Dept: OTHER | Facility: CLINIC | Age: 65
End: 2020-03-04

## 2020-03-04 NOTE — TELEPHONE ENCOUNTER
Reason for Disposition   Age > 36 and no obvious cause for pain, pain still present even when not moving the arm    Protocols used: ARM PAIN-ADULT-OH    Patient called pre-service center [facility] to schedule appointment, with red flag complaint, transferred to RN access for triage. Pt c/o L arm pain from shoulder to fingers that has been going on for the last 4 days. No injury. He has not hit arm on anything. No CP, no unusual sweating, no SOB. Triage indicates for pt to be seen in the ED. [facility] Pt instructed to call back for any new or worsening symptoms. Patient verbalized understanding. Patient denies any other questions or concerns. Please do not respond to the triage nurse through this encounter. Any subsequent communication should be directly with the patient.

## 2020-03-18 ENCOUNTER — TELEPHONE (OUTPATIENT)
Dept: INTERNAL MEDICINE CLINIC | Age: 65
End: 2020-03-18

## 2020-03-26 ENCOUNTER — OFFICE VISIT (OUTPATIENT)
Dept: INTERNAL MEDICINE CLINIC | Age: 65
End: 2020-03-26
Payer: MEDICAID

## 2020-03-26 VITALS
BODY MASS INDEX: 30.9 KG/M2 | HEART RATE: 71 BPM | DIASTOLIC BLOOD PRESSURE: 74 MMHG | SYSTOLIC BLOOD PRESSURE: 126 MMHG | WEIGHT: 190 LBS | OXYGEN SATURATION: 96 %

## 2020-03-26 PROCEDURE — G8427 DOCREV CUR MEDS BY ELIG CLIN: HCPCS | Performed by: INTERNAL MEDICINE

## 2020-03-26 PROCEDURE — 3017F COLORECTAL CA SCREEN DOC REV: CPT | Performed by: INTERNAL MEDICINE

## 2020-03-26 PROCEDURE — G8484 FLU IMMUNIZE NO ADMIN: HCPCS | Performed by: INTERNAL MEDICINE

## 2020-03-26 PROCEDURE — 99213 OFFICE O/P EST LOW 20 MIN: CPT | Performed by: INTERNAL MEDICINE

## 2020-03-26 PROCEDURE — 1036F TOBACCO NON-USER: CPT | Performed by: INTERNAL MEDICINE

## 2020-03-26 PROCEDURE — G8417 CALC BMI ABV UP PARAM F/U: HCPCS | Performed by: INTERNAL MEDICINE

## 2020-03-26 RX ORDER — PREDNISONE 10 MG/1
TABLET ORAL
Qty: 40 TABLET | Refills: 0 | Status: SHIPPED | OUTPATIENT
Start: 2020-03-26 | End: 2020-04-11

## 2020-03-26 NOTE — PATIENT INSTRUCTIONS
Patient Education        The Spine: Anatomy Sketch    Current as of: June 26, 2019Content Version: 12.4  © 8122-2084 Healthwise, Incorporated. Care instructions adapted under license by [facility] ([facility]). If you have questions about a medical condition or this instruction, always ask your healthcare professional. [place] [address] any warranty or liability for your use of this information.

## 2020-03-27 ENCOUNTER — TELEPHONE (OUTPATIENT)
Dept: ORTHOPEDIC SURGERY | Age: 65
End: 2020-03-27

## 2020-03-27 NOTE — TELEPHONE ENCOUNTER
Patient was referred to Dr. Wyatt Pinedo for neck   MRI 2017  PT no  MEDICAL HX negative  Patient is approved to be seen at this time with Cosme Zhang PA-C. Message sent to schedulers   Please notify referring physician if denied.

## 2020-04-10 ENCOUNTER — TELEPHONE (OUTPATIENT)
Dept: ORTHOPEDIC SURGERY | Age: 65
End: 2020-04-10

## 2020-04-10 NOTE — TELEPHONE ENCOUNTER
Attempted to contact patient regarding missed appointment on 4/10/2020 to offer VV, however patient's vm is full and no one answered.

## 2020-10-01 ENCOUNTER — OFFICE VISIT (OUTPATIENT)
Dept: INTERNAL MEDICINE CLINIC | Age: 65
End: 2020-10-01
Payer: MEDICAID

## 2020-10-01 VITALS
HEART RATE: 79 BPM | BODY MASS INDEX: 30.74 KG/M2 | DIASTOLIC BLOOD PRESSURE: 70 MMHG | TEMPERATURE: 97.1 F | OXYGEN SATURATION: 97 % | SYSTOLIC BLOOD PRESSURE: 118 MMHG | WEIGHT: 189 LBS

## 2020-10-01 PROCEDURE — 1123F ACP DISCUSS/DSCN MKR DOCD: CPT | Performed by: INTERNAL MEDICINE

## 2020-10-01 PROCEDURE — 3017F COLORECTAL CA SCREEN DOC REV: CPT | Performed by: INTERNAL MEDICINE

## 2020-10-01 PROCEDURE — 4040F PNEUMOC VAC/ADMIN/RCVD: CPT | Performed by: INTERNAL MEDICINE

## 2020-10-01 PROCEDURE — G8484 FLU IMMUNIZE NO ADMIN: HCPCS | Performed by: INTERNAL MEDICINE

## 2020-10-01 PROCEDURE — 99214 OFFICE O/P EST MOD 30 MIN: CPT | Performed by: INTERNAL MEDICINE

## 2020-10-01 PROCEDURE — 1036F TOBACCO NON-USER: CPT | Performed by: INTERNAL MEDICINE

## 2020-10-01 PROCEDURE — G8417 CALC BMI ABV UP PARAM F/U: HCPCS | Performed by: INTERNAL MEDICINE

## 2020-10-01 PROCEDURE — G8427 DOCREV CUR MEDS BY ELIG CLIN: HCPCS | Performed by: INTERNAL MEDICINE

## 2020-10-01 RX ORDER — AMOXICILLIN 500 MG/1
CAPSULE ORAL
COMMUNITY
Start: 2020-09-25 | End: 2020-12-14

## 2020-10-01 NOTE — PATIENT INSTRUCTIONS
Patient Education        Temporomandibular Disorder: Care Instructions  Your Care Instructions     Temporomandibular (TM) disorders are a problem with the muscles and joints that connect your jaw to your skull. They cause pain when you open your mouth, chew, or yawn. You may feel this pain on one or both sides. TM disorders are often caused by tight jaw muscles. The tightness can be caused by clenching or grinding your teeth. This may happen when you have a lot of stress in your life. If you lower your stress, you may be able to stop clenching or grinding your teeth. This will help relax your jaw and reduce your pain. You may also be able to do some things at home to feel better. But if none of this works, your doctor may prescribe medicine to help relax your muscles and control the pain. Follow-up care is a key part of your treatment and safety. Be sure to make and go to all appointments, and call your doctor if you are having problems. It's also a good idea to know your test results and keep a list of the medicines you take. How can you care for yourself at home? · Put a warm, moist cloth or heating pad set on low on your jaw. Do this for 10 to 20 minutes at a time. Put a thin cloth between the heating pad and your skin. · Avoid hard or chewy foods that cause your jaws to work very hard. Examples include popcorn, jerky, tough meats, chewy breads, gum, and raw apples and carrots. · Choose softer foods that are easy to chew. These include eggs, yogurt, and soup. · Cut your food into small pieces. Chew slowly. · If your jaw gets too painful to chew, or if it locks, you may need to puree your food for a few days or weeks. · To relax your jaw, repeat this exercise for a few minutes every morning and evening. Watch yourself in a mirror. Gently open and close your mouth. Move your jaw straight up and down. But don't do this if it makes your pain worse.   · Get at least 30 minutes of exercise on most days of the week to relieve stress. Walking is a good choice. You also may want to do other activities, such as running, swimming, cycling, or playing tennis or team sports. · Do not:  ? Hold a phone between your shoulder and your jaw. ? Open your mouth all the way, like when you sing loudly or yawn. ? Clench or grind your teeth, bite your lips, or chew your fingernails. ? Clench things such as pens, pipes, or cigars between your teeth. When should you call for help? Call your doctor now or seek immediate medical care if:  · Your jaw is locked open or shut or it is hard to move your jaw. Watch closely for changes in your health, and be sure to contact your doctor if:  · Your jaw pain gets worse. · Your face is swollen. · You do not get better as expected. Where can you learn more? Go to https://DataSpherepePassport Systemseweb.CloSys. org and sign in to your [employer] account. Enter X366 in the [employer] box to learn more about \"Temporomandibular Disorder: Care Instructions. \"     If you do not have an account, please click on the \"Sign Up Now\" link. Current as of: March 25, 2020               Content Version: 12.5  © 2006-2020 Healthwise, Incorporated. Care instructions adapted under license by [facility] ([facility]). If you have questions about a medical condition or this instruction, always ask your healthcare professional. [place] [address] any warranty or liability for your use of this information.

## 2020-10-01 NOTE — PROGRESS NOTES
10/1/2020     Darylene Gosselin (:  1955) is a 72 y.o. male, here for evaluation of the following medical concerns:    Hip Pain    The incident occurred more than 1 week ago. The incident occurred at home. There was no injury mechanism. The pain is present in the left hip. The pain is moderate. The pain has been fluctuating since onset. He reports no foreign bodies present. He has tried acetaminophen for the symptoms. The treatment provided mild relief. Review of Systems   Constitutional: Negative for chills and diaphoresis. HENT: Negative for drooling and ear discharge. Eyes: Negative for pain and redness. Respiratory: Negative for cough and choking. Prior to Visit Medications    Medication Sig Taking? Authorizing Provider   latanoprost (XALATAN) 0.005 % ophthalmic solution  Yes Historical Provider, MD   amoxicillin (AMOXIL) 500 MG capsule   Historical Provider, MD        Social History     Tobacco Use    Smoking status: Never Smoker    Smokeless tobacco: Never Used   Substance Use Topics    Alcohol use: No     Alcohol/week: 0.8 standard drinks     Types: 1 Standard drinks or equivalent per week        Vitals:    10/01/20 1519   BP: 118/70   Site: Right Upper Arm   Position: Sitting   Cuff Size: Large Adult   Pulse: 79   Temp: 97.1 °F (36.2 °C)   TempSrc: Infrared   SpO2: 97%   Weight: 189 lb (85.7 kg)     Estimated body mass index is 30.74 kg/m² as calculated from the following:    Height as of 19: 5' 5.75\" (1.67 m). Weight as of this encounter: 189 lb (85.7 kg). Physical Exam  Constitutional:       Appearance: Normal appearance. HENT:      Head: Normocephalic and atraumatic. Right Ear: Tympanic membrane and ear canal normal.      Left Ear: Tympanic membrane and external ear normal.      Nose: Nose normal. No congestion or rhinorrhea. Mouth/Throat:      Mouth: Mucous membranes are moist.      Pharynx: No oropharyngeal exudate or posterior oropharyngeal erythema. Eyes:      General:         Right eye: No discharge. Left eye: Discharge present. Pupils: Pupils are equal, round, and reactive to light. Neck:      Musculoskeletal: Normal range of motion. No neck rigidity or muscular tenderness. Cardiovascular:      Rate and Rhythm: Normal rate and regular rhythm. Pulses: Normal pulses. Heart sounds: No murmur. No friction rub. Pulmonary:      Effort: Pulmonary effort is normal. No respiratory distress. Breath sounds: No stridor. No wheezing or rhonchi. Musculoskeletal:        Legs:    Neurological:      Mental Status: He is alert. ASSESSMENT/PLAN:  1. Trigger index finger of right hand  worsening  - Shantell Harris MD, Hand Surgery (Hand, Wrist, Elbow), [employer]    2. Chronic left-sided low back pain with left-sided sciatica  worsening  - [address] [place] [address]      Return in about 2 months (around 12/1/2020) for Annual Physical.    An electronic signature was used to authenticate this note.     --Niki Mendoza MD on 10/5/2020 at 11:20 AM

## 2020-10-05 ASSESSMENT — ENCOUNTER SYMPTOMS
EYE REDNESS: 0
CHOKING: 0
COUGH: 0
EYE PAIN: 0

## 2020-10-07 ENCOUNTER — HOSPITAL ENCOUNTER (OUTPATIENT)
Dept: PHYSICAL THERAPY | Age: 65
Setting detail: THERAPIES SERIES
Discharge: HOME | End: 2020-10-07
Payer: MEDICAID

## 2020-10-07 NOTE — PROGRESS NOTES
Monitor Summary  SB/SR  56-62  0.16/0.08/0.38   Physical Therapy  Cancellation/No-show Note  Patient Name:  Crystal Zimmer  :  1955   Date:  10/7/2020  Cancelled visits to date: 0  No-shows to date: 1    Patient status for today's appointment patient:  []  Cancelled  []  Rescheduled appointment  [x]  No-show  10/7 (EVAL)      Reason given by patient:  []  Patient ill  []  Conflicting appointment  []  No transportation    []  Conflict with work  []  No reason given  []  Other:     Comments:      Phone call information:   []  Phone call made today to patient at _ time at number provided:      []  Patient answered, conversation as follows:    []  Patient did not answer, message left as follows:  [x]  Phone call not made today    Electronically signed by:  Norma Anne PT

## 2020-10-12 ENCOUNTER — HOSPITAL ENCOUNTER (OUTPATIENT)
Dept: PHYSICAL THERAPY | Age: 65
Setting detail: THERAPIES SERIES
Discharge: HOME | End: 2020-10-12
Payer: MEDICAID

## 2020-10-12 PROCEDURE — 97530 THERAPEUTIC ACTIVITIES: CPT

## 2020-10-12 PROCEDURE — 97110 THERAPEUTIC EXERCISES: CPT

## 2020-10-12 PROCEDURE — 97161 PT EVAL LOW COMPLEX 20 MIN: CPT

## 2020-10-12 NOTE — FLOWSHEET NOTE
[facility] Physical Therapy  Phone: [phone]   Fax: [phone]    Physical Therapy Daily Treatment Note  Date:  10/12/2020    Patient Name:  Gallo Rashdi    :  1955  MRN: [id number]  Medical/Treatment Diagnosis Information:  Diagnosis: M54.42, G89.29 (ICD-10-CM) - Chronic left-sided low back pain with left-sided sciatica  Treatment Diagnosis: TTP L piriformis, dec B hip strength, R thoracic and L lumbar scoliotic rotation  Insurance/Certification information:  PT Insurance Information: [employer] - 30 visits/yr; Angeline Charles after 30 visits  Physician Information:  Referring Practitioner: Jaun Blackburn MD  Plan of care signed (Y/N): []  Yes [x]  No     Date of Patient follow up with Physician:      Progress Report: []  Yes  [x]  No     Date Range for reporting period:  Beginning: 10/12  Ending:     Progress report due (10 Rx/or 30 days whichever is less): visit #2 or  (date)     Recertification due (POC duration/ or 90 days whichever is less): visit #2 or  (date)     Visit # Insurance Allowable Auth required?  Date Range   1 30 visits/yr [x]  Yes - after 30 visits  []  No      Latex Allergy:  [x]NO      []YES  Preferred Language for Healthcare:   [x]English       []other:    Functional Scale:        Date assessed:  LEFS: raw score = 78/80; dysfunction = 2.5%  10/12/20    Pain level:  4/10     SUBJECTIVE:  See eval    OBJECTIVE: See eval      RESTRICTIONS/PRECAUTIONS: none    Exercises/Interventions:     Therapeutic Exercises (09351) Resistance / level Sets/sec Reps Notes   Bike              CC 3 way hip       Hip hikes on step                                   HEP review  Per HEP Per HEP X 15 min   Therapeutic Activities (18184)       Education provided: pathomechanics of diagnosis, PT POC, HEP review, scheduling, expectations for therapy, moving wallet to front pocket/removing when seated, sleep positioning to reduce tension on piriformis  X 10 min significant amount of measurable one-on-one time to either patient, for improvements in  [] LE / lumbar: LE, proximal hip, and core control in self care, mobility, lifting, ambulation and eccentric single leg control. [] UE / cervical: cervical, scapular, scapulothoracic and UE control with self care, reaching, carrying, lifting, house/yardwork, driving, computer work. NMR and Therapeutic Activities:    [x] (59664 or 44907) Provided verbal/tactile cueing for activities related to improving balance, coordination, kinesthetic sense, posture, motor skill, proprioception and motor activation to allow for proper function of   [x] LE: / Lumbar core, proximal hip and LE with self care and ADLs  [] UE / Cervical: cervical, postural, scapular, scapulothoracic and UE control with self care, carrying, lifting, driving, computer work.   [] (50737) Gait Re-education- Provided training and instruction to the patient for proper LE, core and proximal hip recruitment and positioning and eccentric body weight control with ambulation re-education including up and down stairs     Home Management Training / Self Care:  [] (01495) Provided self-care/home management training related to activities of daily living and compensatory training, and/or use of adaptive equipment for improvement with: ADLs and compensatory training, meal preparation, safety procedures and instruction in use of adaptive equipment, including bathing, grooming, dressing, personal hygiene, basic household cleaning and chores.      Home Exercise Program:    [x] (61014) Reviewed/Progressed HEP activities related to strengthening, flexibility, endurance, ROM of   [x] LE / Lumbar: core, proximal hip and LE for functional self-care, mobility, lifting and ambulation/stair navigation   [] UE / Cervical: cervical, postural, scapular, scapulothoracic and UE control with self care, reaching, carrying, lifting, house/yardwork, driving, computer work  [] (29533)Reviewed/Progressed HEP activities related to improving balance, coordination, kinesthetic sense, posture, motor skill, proprioception of   [] LE: core, proximal hip and LE for self care, mobility, lifting, and ambulation/stair navigation    [] UE / Cervical: cervical, postural,  scapular, scapulothoracic and UE control with self care, reaching, carrying, lifting, house/yardwork, driving, computer work    Manual Treatments:  PROM / STM / Oscillations-Mobs:  G-I, II, III, IV (PA's, Inf., Post.)  [] (09314) Provided manual therapy to mobilize LE, proximal hip and/or LS spine soft tissue/joints for the purpose of modulating pain, promoting relaxation,  increasing ROM, reducing/eliminating soft tissue swelling/inflammation/restriction, improving soft tissue extensibility and allowing for proper ROM for normal function with   [] LE / lumbar: self care, mobility, lifting and ambulation. [] UE / Cervical: self care, reaching, carrying, lifting, house/yardwork, driving, computer work. Modalities:  [] (33172) Vasopneumatic compression: Utilized vasopneumatic compression to decrease edema / swelling for the purpose of improving mobility and quad tone / recruitment which will allow for increased overall function including but not limited to self-care, transfers, ambulation, and ascending / descending stairs.        Modalities:      Charges:  Timed Code Treatment Minutes: 25   Total Treatment Minutes: 40     [x] EVAL - LOW (52297)   [] EVAL - MOD (86327)  [] EVAL - HIGH (05050)  [] RE-EVAL (98361)  [x] BN(54468) x  1     [] Ionto  [] NMR (63069) x       [] Vaso  [] Manual (84617) x       [] Ultrasound  [x] TA x  1      [] Mech Traction (67216)  [] Aquatic Therapy x     [] ES (un) (02139):   [] Home Management Training x  [] ES(attended) (96983)   [] Dry Needling 1-2 muscles (19853):  [] Dry Needling 3+ muscles (162413)  [] Group:      [] Other:     GOALS:   Patient stated goal: dec pain when sleeping  [] Progressing: [] Met: [] Not Met: [] Adjusted    Therapist goals for Patient:   Short and Long Term Goals: To be achieved in: 2 weeks  1. Independent in HEP and progression per patient tolerance, in order to prevent re-injury. [] Progressing: [] Met: [] Not Met: [] Adjusted  2. Patient will have a decrease in pain to facilitate improvement in movement, function, and ADLs as indicated by Functional Deficits. [] Progressing: [] Met: [] Not Met: [] Adjusted    Overall Progression Towards Functional goals/ Treatment Progress Update:  [] Patient is progressing as expected towards functional goals listed. [] Progression is slowed due to complexities/Impairments listed. [] Progression has been slowed due to co-morbidities.   [x] Plan just implemented, too soon to assess goals progression <30days   [] Goals require adjustment due to lack of progress  [] Patient is not progressing as expected and requires additional follow up with physician  [] Other    Persisting Functional Limitations/Impairments:  [x]Sleeping []Sitting               []Standing []Transfers        []Walking []Kneeling               []Stairs []Squatting / bending   []ADLs []Reaching  []Lifting  []Housework  []Driving []Job related tasks  []Sports/Recreation []Other:        ASSESSMENT:  See eval  Treatment/Activity Tolerance:  [x] Patient able to complete tx [] Patient limited by fatigue  [] Patient limited by pain  [] Patient limited by other medical complications  [] Other:     Prognosis: [x] Good [] Fair  [] Poor    Patient Requires Follow-up: [x] Yes  [] No    Plan for next treatment session: Review HEP and sitting positioning; hip and core strengthening    PLAN: See eval. eval + 1 visit in 2 weeks for follow up of HEP  [] Continue per plan of care [] Alter current plan (see comments)  [x] Plan of care initiated [] Hold pending MD visit [] Discharge    Electronically signed by: Dameon France PT, DPT    Note: If patient does not return for scheduled/ recommended follow up visits, this note will serve as a discharge from care along with most recent update on progress.

## 2020-10-12 NOTE — PLAN OF CARE
[address] [address], [facility] [address]  Phone: [phone]   Fax: [phone]     Physical Therapy Certification    Dear Referring Practitioner: Jluis Coon MD,    We had the pleasure of evaluating the following patient for physical therapy services at [address] [place] [address]. A summary of our findings can be found in the initial assessment below. This includes our plan of care. If you have any questions or concerns regarding these findings, please do not hesitate to contact me at the office phone number checked above. Thank you for the referral.       Physician Signature:_______________________________Date:__________________  By signing above (or electronic signature), therapists plan is approved by physician      Patient: Radha Moore   : 1955   MRN: [id number]  Referring Physician: Referring Practitioner: Jluis Coon MD      Evaluation Date: 10/12/2020      Medical Diagnosis Information:  Diagnosis: M54.42, G89.29 (ICD-10-CM) - Chronic left-sided low back pain with left-sided sciatica   Treatment Diagnosis: TTP L piriformis, dec B hip strength, R thoracic and L lumbar scoliotic rotation                                         Insurance information: PT Insurance Information: [place] medicaid - 30 visits/yr; Lydia Hence req after 30 visits     Precautions/ Contra-indications: none  Latex Allergy:  [x]NO      []YES  Preferred Language for Healthcare:   [x]English       []other:    SUBJECTIVE: Patient reports insidious onset of lateral L hip pain, ongoing for 6 months. Pt reports waking up with throbbing pain. Pt has inc pain with going up and down stairs. Pt reports most pain at night when he is trying to sleep, especially when sleeping on L side. 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     Fear avoidance: I should not do physical []Motivation/Lack of Motivation                        []Co-Morbidities              []Cognitive Function, education/learning barriers              [x]Environmental, home barriers              []profession/work barriers  []past PT/medical experience  []other:  Justification:     Falls Risk Assessment (30 days):   [x] Falls Risk assessed and no intervention required. [] Falls Risk assessed and Patient requires intervention due to being higher risk   TUG score (>12s at risk):     [] Falls education provided, including         ASSESSMENT: Juve Redman is a 72 y.o. male presenting to physical therapy with c/o L posterio-lateral hip pain. Pt demonstrates TTP L piriformis, dec B hip strength, R thoracic and L lumbar scoliotic rotation. It appears patient's pain is most likely affected by sitting on wallet in L pocked. Pt educated to move wallet to front pocket/remove when in seated position. Pt would benefit from skilled PT with one time visit to ensure compliance with HEP to return to PLOF and dec pain with sleep and first thing upon waking.     Functional Impairments:     []Noted lumbar/proximal hip hypomobility   []Noted lumbosacral and/or generalized hypermobility   [x]Decreased Lumbosacral/hip/LE functional ROM   [x]Decreased core/proximal hip strength and neuromuscular control    [x]Decreased LE functional strength    []Abnormal reflexes/sensation/myotomal/dermatomal deficits  []Reduced balance/proprioceptive control    []other:      Functional Activity Limitations (from functional questionnaire and intake)   [x]Reduced ability to tolerate prolonged functional positions   []Reduced ability or difficulty with changes of positions or transfers between positions   []Reduced ability to maintain good posture and demonstrate good body mechanics with sitting, bending, and lifting   [x]Reduced ability to sleep   [] Reduced ability or tolerance with driving and/or computer work   []Reduced ability to perform lifting, reaching, carrying tasks   []Reduced ability to squat   []Reduced ability to forward bend   []Reduced ability to ambulate prolonged functional periods/distances/surfaces   []Reduced ability to ascend/descend stairs   []other:       Participation Restrictions   []Reduced participation in self care activities   []Reduced participation in home management activities   []Reduced participation in work activities   []Reduced participation in social activities. []Reduced participation in sport/recreational activities. Classification:   []Signs/symptoms consistent with Lumbar instability/stabilization subgroup. []Signs/symptoms consistent with Lumbar mobilization/manipulation subgroup, myotomes and dermatomes intact. Meets manipulation criteria. []Signs/symptoms consistent with Lumbar direction specific/centralization subgroup   []Signs/symptoms consistent with Lumbar traction subgroup     []Signs/symptoms consistent with lumbar facet dysfunction   []Signs/symptoms consistent with lumbar stenosis type dysfunction   []Signs/symptoms consistent with nerve root involvement including myotome & dermatome dysfunction   []Signs/symptoms consistent with post-surgical status including: decreased ROM, strength and function.    []signs/symptoms consistent with pathology which may benefit from Dry needling     [x]other:  Signs/symptoms consistent with piriformis syndrome    Prognosis/Rehab Potential:      [x]Excellent   []Good    []Fair   []Poor    Tolerance of evaluation/treatment:    [x]Excellent   []Good    []Fair   []Poor     Physical Therapy Evaluation Complexity Justification  [x] A history of present problem with:  [] no personal factors and/or comorbidities that impact the plan of care;  [x]1-2 personal factors and/or comorbidities that impact the plan of care  []3 personal factors and/or comorbidities that impact the plan of care  [x] An examination of body systems using standardized tests and measures addressing a decrease in pain to facilitate improvement in movement, function, and ADLs as indicated by Functional Deficits.   [] Progressing: [] Met: [] Not Met: [] Adjusted       Electronically signed by:  Doug Ferreira PT

## 2020-10-28 ENCOUNTER — APPOINTMENT (OUTPATIENT)
Dept: PHYSICAL THERAPY | Age: 65
End: 2020-10-28
Payer: MEDICAID

## 2020-12-14 ENCOUNTER — OFFICE VISIT (OUTPATIENT)
Dept: INTERNAL MEDICINE CLINIC | Age: 65
End: 2020-12-14
Payer: MEDICAID

## 2020-12-14 VITALS
DIASTOLIC BLOOD PRESSURE: 72 MMHG | WEIGHT: 189 LBS | SYSTOLIC BLOOD PRESSURE: 130 MMHG | HEART RATE: 79 BPM | HEIGHT: 66 IN | TEMPERATURE: 96 F | BODY MASS INDEX: 30.37 KG/M2 | OXYGEN SATURATION: 96 %

## 2020-12-14 LAB
A/G RATIO: 1.8 (ref 1.1–2.2)
ALBUMIN SERPL-MCNC: 4.4 G/DL (ref 3.4–5)
ALP BLD-CCNC: 125 U/L (ref 40–129)
ALT SERPL-CCNC: 21 U/L (ref 10–40)
ANION GAP SERPL CALCULATED.3IONS-SCNC: 11 MMOL/L (ref 3–16)
AST SERPL-CCNC: 18 U/L (ref 15–37)
BILIRUB SERPL-MCNC: 0.5 MG/DL (ref 0–1)
BUN BLDV-MCNC: 8 MG/DL (ref 7–20)
CALCIUM SERPL-MCNC: 9.3 MG/DL (ref 8.3–10.6)
CHLORIDE BLD-SCNC: 105 MMOL/L (ref 99–110)
CHOLESTEROL, TOTAL: 176 MG/DL (ref 0–199)
CO2: 27 MMOL/L (ref 21–32)
CREAT SERPL-MCNC: 1 MG/DL (ref 0.8–1.3)
GFR AFRICAN AMERICAN: >60
GFR NON-AFRICAN AMERICAN: >60
GLOBULIN: 2.5 G/DL
GLUCOSE BLD-MCNC: 106 MG/DL (ref 70–99)
HDLC SERPL-MCNC: 50 MG/DL (ref 40–60)
LDL CHOLESTEROL CALCULATED: 109 MG/DL
POTASSIUM SERPL-SCNC: 4.2 MMOL/L (ref 3.5–5.1)
SODIUM BLD-SCNC: 143 MMOL/L (ref 136–145)
TOTAL PROTEIN: 6.9 G/DL (ref 6.4–8.2)
TRIGL SERPL-MCNC: 87 MG/DL (ref 0–150)
VLDLC SERPL CALC-MCNC: 17 MG/DL

## 2020-12-14 PROCEDURE — G8484 FLU IMMUNIZE NO ADMIN: HCPCS | Performed by: INTERNAL MEDICINE

## 2020-12-14 PROCEDURE — 99397 PER PM REEVAL EST PAT 65+ YR: CPT | Performed by: INTERNAL MEDICINE

## 2020-12-14 ASSESSMENT — ENCOUNTER SYMPTOMS
CHOKING: 0
EYE REDNESS: 0
EYE PAIN: 0
ABDOMINAL PAIN: 0
ANAL BLEEDING: 0
CHEST TIGHTNESS: 0

## 2020-12-14 ASSESSMENT — PATIENT HEALTH QUESTIONNAIRE - PHQ9
1. LITTLE INTEREST OR PLEASURE IN DOING THINGS: 0
SUM OF ALL RESPONSES TO PHQ QUESTIONS 1-9: 0
SUM OF ALL RESPONSES TO PHQ QUESTIONS 1-9: 0
2. FEELING DOWN, DEPRESSED OR HOPELESS: 0
SUM OF ALL RESPONSES TO PHQ9 QUESTIONS 1 & 2: 0
SUM OF ALL RESPONSES TO PHQ QUESTIONS 1-9: 0

## 2020-12-14 NOTE — PROGRESS NOTES
2020    Radha Moore (:  1955) is a 72 y.o. male, here for a preventive medicine evaluation. Patient woke up with some chest pain. He states that it is intermittent and not associated with activity. He states that it started while he was brushing his teeth. He denied any dyspnea. Patient also reports some testicular pain. This has been ongoing fo 12 months. Patient feels some dizziness. He states it is not vertigo as he has had that before. He states that  It lasts about 1 minute. When he stops moving, the symptoms resolve. Patient Active Problem List   Diagnosis    Hordeolum externum of right upper eyelid    Right-sided tinnitus    Bilateral chronic serous otitis media    Pulsatile tinnitus of right ear    Bilateral high frequency sensorineural hearing loss    Diverticulosis of large intestine without hemorrhage     Patient occassionally wears seat belt when driving. Patient does text and drive occasionally. Patient has been to the dentist within the past year. Review of Systems   Constitutional: Negative for diaphoresis and fatigue. HENT: Negative for drooling, ear discharge and ear pain. Eyes: Negative for pain and redness. Respiratory: Negative for choking and chest tightness. Cardiovascular: Negative for chest pain and leg swelling. Gastrointestinal: Negative for abdominal pain and anal bleeding. Prior to Visit Medications    Medication Sig Taking? Authorizing Provider   latanoprost (XALATAN) 0.005 % ophthalmic solution  Yes Historical Provider, MD        No Known Allergies    No past medical history on file.     Past Surgical History:   Procedure Laterality Date    COLONOSCOPY  2018       Social History     Socioeconomic History    Marital status:      Spouse name: Not on file    Number of children: Not on file    Years of education: Not on file    Highest education level: Not on file   Occupational History    Not on file Social Needs    Financial resource strain: Not on file    Food insecurity     Worry: Not on file     Inability: Not on file    Transportation needs     Medical: Not on file     Non-medical: Not on file   Tobacco Use    Smoking status: Never Smoker    Smokeless tobacco: Never Used   Substance and Sexual Activity    Alcohol use: No     Alcohol/week: 0.8 standard drinks     Types: 1 Standard drinks or equivalent per week    Drug use: No    Sexual activity: Not on file   Lifestyle    Physical activity     Days per week: Not on file     Minutes per session: Not on file    Stress: Not on file   Relationships    Social connections     Talks on phone: Not on file     Gets together: Not on file     Attends Scientologist service: Not on file     Active member of club or organization: Not on file     Attends meetings of clubs or organizations: Not on file     Relationship status: Not on file    Intimate partner violence     Fear of current or ex partner: Not on file     Emotionally abused: Not on file     Physically abused: Not on file     Forced sexual activity: Not on file   Other Topics Concern    Not on file   Social History Narrative    Not on file        Family History   Problem Relation Age of Onset    Heart Disease Paternal Uncle     High Blood Pressure Mother     Diabetes Mother     High Blood Pressure Father        ADVANCE DIRECTIVE: N, <no information>    Vitals:    12/14/20 0850   BP: 130/72   Site: Right Upper Arm   Position: Sitting   Cuff Size: Large Adult   Pulse: 79   Temp: 96 °F (35.6 °C)   TempSrc: Infrared   SpO2: 96%   Weight: 189 lb (85.7 kg)   Height: 5' 6\" (1.676 m)     Estimated body mass index is 30.51 kg/m² as calculated from the following:    Height as of this encounter: 5' 6\" (1.676 m). Weight as of this encounter: 189 lb (85.7 kg). Physical Exam  Constitutional:       Appearance: Normal appearance. HENT:      Head: Normocephalic and atraumatic. Right Ear: Tympanic membrane and ear canal normal.      Left Ear: Tympanic membrane and ear canal normal.      Nose: Nose normal. No congestion or rhinorrhea. Mouth/Throat:      Mouth: Mucous membranes are moist.      Pharynx: No oropharyngeal exudate or posterior oropharyngeal erythema. Eyes:      General:         Right eye: No discharge. Left eye: No discharge. Pupils: Pupils are equal, round, and reactive to light. Neck:      Musculoskeletal: Normal range of motion and neck supple. No neck rigidity or muscular tenderness. Cardiovascular:      Rate and Rhythm: Normal rate and regular rhythm. Pulmonary:      Effort: Pulmonary effort is normal. No respiratory distress. Breath sounds: No stridor. No wheezing or rhonchi. Abdominal:      General: Abdomen is flat. There is no distension. Palpations: There is no mass. Tenderness: There is no abdominal tenderness. There is no guarding or rebound. Hernia: No hernia is present. Genitourinary:     Penis: Circumcised. No phimosis, paraphimosis, hypospadias or erythema. Testes:         Right: Testicular hydrocele present. Swelling not present. Right testis is descended. Cremasteric reflex is present. Left: Swelling not present. Left testis is descended. Cremasteric reflex is present. Neurological:      Mental Status: He is alert. No flowsheet data found.     Lab Results   Component Value Date    CHOL 199 01/17/2020    CHOL 187 01/30/2017    CHOL 186 03/04/2016    TRIG 81 01/17/2020    TRIG 136 01/30/2017    TRIG 115 03/04/2016    HDL 51 01/17/2020    HDL 45 01/30/2017    HDL 46 03/04/2016    HDL 49 11/23/2011    LDLCALC 132 01/17/2020    LDLCALC 115 01/30/2017    LDLCALC 117 03/04/2016    GLUCOSE 103 01/17/2020    LABA1C 6.3 01/15/2019       The 10-year ASCVD risk score (Barb Davis., et al., 2013) is: 10.1%    Values used to calculate the score:      Age: 72 years      Sex: Male Is Non- : Yes      Diabetic: No      Tobacco smoker: No      Systolic Blood Pressure: 891 mmHg      Is BP treated: No      HDL Cholesterol: 51 mg/dL      Total Cholesterol: 199 mg/dL    Immunization History   Administered Date(s) Administered    Tdap (Boostrix, Adacel) 06/02/2014       Health Maintenance   Topic Date Due    A1C test (Diabetic or Prediabetic)  01/15/2020    Flu vaccine (1) 10/01/2021 (Originally 9/1/2020)    Shingles Vaccine (1 of 2) 12/14/2021 (Originally 8/5/2005)    Pneumococcal 65+ years Vaccine (1 of 1 - PPSV23) 12/17/2021 (Originally 8/5/2020)    PSA counseling  01/17/2021    DTaP/Tdap/Td vaccine (2 - Td) 06/02/2024    Lipid screen  01/17/2025    Colon cancer screen colonoscopy  05/04/2028    Hepatitis C screen  Completed    HIV screen  Completed    Hepatitis A vaccine  Aged Out    Hepatitis B vaccine  Aged Out    Hib vaccine  Aged Out    Meningococcal (ACWY) vaccine  Aged Out       ASSESSMENT/PLAN:  1. Well adult exam  stable  - Hemoglobin A1C  - Comprehensive Metabolic Panel  - Lipid Panel  - encourage patient to exercise more    2. Chest pain, unspecified type  worsening  - [address] [facility], DO, Cardiology, [facility]    3. Inguinal bulge  - Barrett Litten, MD, General Surgery, [facility]      No follow-ups on file. An electronic signature was used to authenticate this note.     --Loida Kemp MD on 12/14/2020 at 1:18 PM

## 2020-12-15 LAB
ESTIMATED AVERAGE GLUCOSE: 128.4 MG/DL
HBA1C MFR BLD: 6.1 %

## 2020-12-21 PROBLEM — R42 DIZZINESS: Status: ACTIVE | Noted: 2020-12-21

## 2020-12-21 PROBLEM — E78.5 HYPERLIPIDEMIA: Status: ACTIVE | Noted: 2020-12-21

## 2020-12-21 NOTE — PROGRESS NOTES
2020    PATIENT: Latisha Morris  : 1955    Primary Care Provider:   Aidan Reyna MD  H:[phone]  f:[phone]    Reason for evaluation:   Chief Complaint   Patient presents with    Chest Pain     moderate intermittent, LT arm pain    Established New Doctor     History of present illness:   Mr. Latisha Morris is a 72 y.o. male patient here for first time cardiovascular evaluation regarding intermittent chest pain. He has a history of hyperlipidemia. He is a never smoker. He first reported the pain in February of this year. He described it as an aching into his left arm. He completed a GXT at that time and repeat port shows that he had 7 out of 10 pain at prior to starting the test without exacerbation during GXT. He again states that the treadmill and activity in general over recent months has not been a precipitant. He states the only pattern that he has found is \"chest aggravated by the cold and left arm by certain positions\". He feels it is possible that it is coming from his neck with some occurrences described as radiating down arm. Denies palpitations, lightheadedness, or syncope and is without shortness of breath, PND, orthopnea, or LE edema. He has upcoming surgery appointments for concerns of hernia and back pain.    Medical History:      Diagnosis Date    Hyperlipidemia 2020     Surgical History:      Procedure Laterality Date    COLONOSCOPY  2018     Social History:  Social History     Socioeconomic History    Marital status:      Spouse name: Not on file    Number of children: Not on file    Years of education: Not on file    Highest education level: Not on file   Occupational History    Not on file   Social Needs    Financial resource strain: Not on file    Food insecurity     Worry: Not on file     Inability: Not on file    Transportation needs     Medical: Not on file     Non-medical: Not on file Tobacco Use    Smoking status: Never Smoker    Smokeless tobacco: Never Used   Substance and Sexual Activity    Alcohol use: No     Alcohol/week: 0.8 standard drinks     Types: 1 Standard drinks or equivalent per week    Drug use: No    Sexual activity: Not on file   Lifestyle    Physical activity     Days per week: Not on file     Minutes per session: Not on file    Stress: Not on file   Relationships    Social connections     Talks on phone: Not on file     Gets together: Not on file     Attends Denominational service: Not on file     Active member of club or organization: Not on file     Attends meetings of clubs or organizations: Not on file     Relationship status: Not on file    Intimate partner violence     Fear of current or ex partner: Not on file     Emotionally abused: Not on file     Physically abused: Not on file     Forced sexual activity: Not on file   Other Topics Concern    Not on file   Social History Narrative    Not on file        Family History:  No evidence for sudden cardiac death or premature CAD. Problem Relation Age of Onset    Heart Disease Paternal Uncle     High Blood Pressure Mother     Diabetes Mother     High Blood Pressure Father        Medications:  [x] Medications and dosages reviewed. Prior to Admission medications    Medication Sig Start Date End Date Taking? Authorizing Provider   metoprolol tartrate (LOPRESSOR) 50 MG tablet Take one tablet twice daily for 4 doses prior to Coronary CTA 12/22/20  Yes Tino Vitale,    latanoprost (XALATAN) 0.005 % ophthalmic solution  1/6/20  Yes Historical Provider, MD       Allergies:  Patient has no known allergies.      Review of Systems:    [x]Full ROS obtained and negative except as mentioned in HPI    Physical Examination:    /74 (Site: Left Upper Arm, Position: Sitting, Cuff Size: Large Adult)   Pulse 83   Ht 5' 7\" (1.702 m)   Wt 191 lb 4.8 oz (86.8 kg)   SpO2 96%   BMI 29.96 kg/m² Wt Readings from Last 3 Encounters:   12/22/20 191 lb 4.8 oz (86.8 kg)   12/14/20 189 lb (85.7 kg)   10/01/20 189 lb (85.7 kg)     Vitals:    12/22/20 0852   BP: 120/74   Pulse: 83   SpO2: 96%       · GENERAL: Well developed, well nourished, no acute distress  · NEUROLOGICAL: Alert and oriented x3  · PSYCH: Normal mood and affect   · SKIN: Warm and dry  · HEENT: Normocephalic, atraumatic, Sclera non-icteric, mucous membranes moist  · NECK: supple, JVP normal  · CARDIAC: Normal PMI, regular rate and rhythm, normal S1S2, no murmur, rub, or gallop  · RESPIRATORY: Normal respiratory effort, clear to auscultation bilaterally  · EXTREMITIES: no edema or clubbing, +2 pulses bilaterally   · MUSCULOSKELETAL: No joint swelling or tenderness, no chest wall tenderness  · GASTROINTESTINAL: normal bowel sounds, soft, non-tender    Labs:  Lab Review   Office Visit on 12/14/2020   Component Date Value    Hemoglobin A1C 12/14/2020 6.1     eAG 12/14/2020 128.4     Sodium 12/14/2020 143     Potassium 12/14/2020 4.2     Chloride 12/14/2020 105     CO2 12/14/2020 27     Anion Gap 12/14/2020 11     Glucose 12/14/2020 106*    BUN 12/14/2020 8     CREATININE 12/14/2020 1.0     GFR Non- 12/14/2020 >60     GFR  12/14/2020 >60     Calcium 12/14/2020 9.3     Total Protein 12/14/2020 6.9     Alb 12/14/2020 4.4     Albumin/Globulin Ratio 12/14/2020 1.8     Total Bilirubin 12/14/2020 0.5     Alkaline Phosphatase 12/14/2020 125     ALT 12/14/2020 21     AST 12/14/2020 18     Globulin 12/14/2020 2.5     Cholesterol, Total 12/14/2020 176     Triglycerides 12/14/2020 87     HDL 12/14/2020 50     LDL Calculated 12/14/2020 109*    VLDL Cholesterol Calcula* 12/14/2020 17        Imaging:  I have reviewed the below testing personally:    EKG 12/22/20  SR  WNL    2011  SR, Low voltage    SPECT 1/28/11  FINDINGS- The stress and rest tomographic views show normal radionuclide distribution throughout the myocardium. There is no evidence of ischemia or infarction. The wall motion is normal and the left ventricular ejection fraction is calculated at 70%. The patient exercised for 7 minutes and 30 seconds achieving an age-predicted heart rate of 90%. GXT 2/17/20   Rest   ECG   Normal sinus rhythm. Stress   Stress Type: Exercise      Stress Protocol: Antolin      Rest HR: 79 bpm                             HR BP Product: 90081   Rest BP: 127/88 mmHg                        Max Exercise: 7 METS   Stress Peak HR: 137 bpm   Stress Peak BP: 156/71 mmHg   Predicted HR: 156 bpm   % of predicted HR: 88   Test Duration: 6 min   Reason for Termination: Physiologic Maximum      Results      ECG   Normal sinus rhythm. Normal electrocardiographic response to exercise with less than 1.0 mm of up   sloping ST depression. Symptoms   There was stress induced lightheadedness. Patient had \"muscular\" chest discomfort 7/10 before stress with no change   noted. Symptoms resolved with rest.      Impression/Recommendations    Mr. Gary Salcido is a 72 y.o. male patient with:    Chest pain, atypical   Hyperlipidemia, stable (12/2020:  TG 87 HDL 50 )  Borderline diabetes mellitus     The 10-year ASCVD risk score (Trisha Oconnell., et al., 2013) is: 8.5%    Values used to calculate the score:      Age: 72 years      Sex: Male      Is Non- : Yes      Diabetic: No      Tobacco smoker: No      Systolic Blood Pressure: 245 mmHg      Is BP treated: No      HDL Cholesterol: 50 mg/dL      Total Cholesterol: 176 mg/dL    Discussed normal stress testing from 2011 and 2020. He is with recurrent chest and left arm symptoms with atypical features. Discussed intermediate ASCVD risk profile as well as potential need for cardiac clearance should he pursue surgical options for hernia or spine.

## 2020-12-22 ENCOUNTER — OFFICE VISIT (OUTPATIENT)
Dept: CARDIOLOGY CLINIC | Age: 65
End: 2020-12-22
Payer: MEDICAID

## 2020-12-22 VITALS
DIASTOLIC BLOOD PRESSURE: 74 MMHG | HEIGHT: 67 IN | SYSTOLIC BLOOD PRESSURE: 120 MMHG | HEART RATE: 83 BPM | OXYGEN SATURATION: 96 % | BODY MASS INDEX: 30.02 KG/M2 | WEIGHT: 191.3 LBS

## 2020-12-22 PROCEDURE — 93000 ELECTROCARDIOGRAM COMPLETE: CPT | Performed by: INTERNAL MEDICINE

## 2020-12-22 PROCEDURE — 99204 OFFICE O/P NEW MOD 45 MIN: CPT | Performed by: INTERNAL MEDICINE

## 2020-12-22 RX ORDER — METOPROLOL TARTRATE 50 MG/1
TABLET, FILM COATED ORAL
Qty: 5 TABLET | Refills: 0 | Status: SHIPPED | OUTPATIENT
Start: 2020-12-22 | End: 2020-12-22

## 2020-12-22 RX ORDER — METOPROLOL TARTRATE 50 MG/1
TABLET, FILM COATED ORAL
Qty: 5 TABLET | Refills: 0 | Status: SHIPPED | OUTPATIENT
Start: 2020-12-22 | End: 2021-01-06 | Stop reason: ALTCHOICE

## 2020-12-22 NOTE — PROGRESS NOTES
[facility] is no longer - spoke to patient and patient stated to send to [facility] [address] - RX was sent to the pharmacy of patient choose

## 2020-12-22 NOTE — PATIENT INSTRUCTIONS
Echocardiogram   Coronary CTA    Take Metoprolol Tartrate 50 mg for 4 doses twice daily prior to Coronary CTA (One tablet in the evening two days prior, twice daily the day before, and one tablet the morning of)  Bring an extra tablet with you to the test

## 2020-12-22 NOTE — LETTER
[address] [place] [address] Cardiology - [facility]  [address] [address]  Phone: [phone]  Fax: [address] [place] [address] Dr,         2021     Adilia Vaughan,  [address] [facility]    Patient: Zaida Snell  MR Number: [id number]  YOB: 1955  Date of Visit: 2020    Dear Dr. Adilia Vaughan:                                         2020    PATIENT: Zaida Snell  : 1955    Primary Care Provider:   Adilia Vaughan MD  B:[phone]  f:[phone]    Reason for evaluation:   Chief Complaint   Patient presents with    Chest Pain     moderate intermittent, LT arm pain    Established New Doctor     History of present illness:   Mr. Zaida Snell is a 72 y.o. male patient here for first time cardiovascular evaluation regarding intermittent chest pain. He has a history of hyperlipidemia. He is a never smoker. He first reported the pain in February of this year. He described it as an aching into his left arm. He completed a GXT at that time and repeat port shows that he had 7 out of 10 pain at prior to starting the test without exacerbation during GXT. He again states that the treadmill and activity in general over recent months has not been a precipitant. He states the only pattern that he has found is \"chest aggravated by the cold and left arm by certain positions\". He feels it is possible that it is coming from his neck with some occurrences described as radiating down arm. Denies palpitations, lightheadedness, or syncope and is without shortness of breath, PND, orthopnea, or LE edema. He has upcoming surgery appointments for concerns of hernia and back pain.    Medical History:      Diagnosis Date    Hyperlipidemia 2020     Surgical History:      Procedure Laterality Date    COLONOSCOPY  2018     Social History:  Social History     Socioeconomic History    Marital status:  Spouse name: Not on file    Number of children: Not on file    Years of education: Not on file    Highest education level: Not on file   Occupational History    Not on file   Social Needs    Financial resource strain: Not on file    Food insecurity     Worry: Not on file     Inability: Not on file    Transportation needs     Medical: Not on file     Non-medical: Not on file   Tobacco Use    Smoking status: Never Smoker    Smokeless tobacco: Never Used   Substance and Sexual Activity    Alcohol use: No     Alcohol/week: 0.8 standard drinks     Types: 1 Standard drinks or equivalent per week    Drug use: No    Sexual activity: Not on file   Lifestyle    Physical activity     Days per week: Not on file     Minutes per session: Not on file    Stress: Not on file   Relationships    Social connections     Talks on phone: Not on file     Gets together: Not on file     Attends Zoroastrian service: Not on file     Active member of club or organization: Not on file     Attends meetings of clubs or organizations: Not on file     Relationship status: Not on file    Intimate partner violence     Fear of current or ex partner: Not on file     Emotionally abused: Not on file     Physically abused: Not on file     Forced sexual activity: Not on file   Other Topics Concern    Not on file   Social History Narrative    Not on file        Family History:  No evidence for sudden cardiac death or premature CAD. Problem Relation Age of Onset    Heart Disease Paternal Uncle     High Blood Pressure Mother     Diabetes Mother     High Blood Pressure Father        Medications:  [x] Medications and dosages reviewed. Prior to Admission medications    Medication Sig Start Date End Date Taking?  Authorizing Provider   metoprolol tartrate (LOPRESSOR) 50 MG tablet Take one tablet twice daily for 4 doses prior to Coronary CTA 12/22/20  Yes [facility] DO latanoprost (XALATAN) 0.005 % ophthalmic solution  1/6/20  Yes Historical Provider, MD       Allergies:  Patient has no known allergies.      Review of Systems:    [x]Full ROS obtained and negative except as mentioned in HPI    Physical Examination:    /74 (Site: Left Upper Arm, Position: Sitting, Cuff Size: Large Adult)   Pulse 83   Ht 5' 7\" (1.702 m)   Wt 191 lb 4.8 oz (86.8 kg)   SpO2 96%   BMI 29.96 kg/m²   Wt Readings from Last 3 Encounters:   12/22/20 191 lb 4.8 oz (86.8 kg)   12/14/20 189 lb (85.7 kg)   10/01/20 189 lb (85.7 kg)     Vitals:    12/22/20 0852   BP: 120/74   Pulse: 83   SpO2: 96%       · GENERAL: Well developed, well nourished, no acute distress  · NEUROLOGICAL: Alert and oriented x3  · PSYCH: Normal mood and affect   · SKIN: Warm and dry  · HEENT: Normocephalic, atraumatic, Sclera non-icteric, mucous membranes moist  · NECK: supple, JVP normal  · CARDIAC: Normal PMI, regular rate and rhythm, normal S1S2, no murmur, rub, or gallop  · RESPIRATORY: Normal respiratory effort, clear to auscultation bilaterally  · EXTREMITIES: no edema or clubbing, +2 pulses bilaterally   · MUSCULOSKELETAL: No joint swelling or tenderness, no chest wall tenderness  · GASTROINTESTINAL: normal bowel sounds, soft, non-tender    Labs:  Lab Review   Office Visit on 12/14/2020   Component Date Value    Hemoglobin A1C 12/14/2020 6.1     eAG 12/14/2020 128.4     Sodium 12/14/2020 143     Potassium 12/14/2020 4.2     Chloride 12/14/2020 105     CO2 12/14/2020 27     Anion Gap 12/14/2020 11     Glucose 12/14/2020 106*    BUN 12/14/2020 8     CREATININE 12/14/2020 1.0     GFR Non- 12/14/2020 >60     GFR  12/14/2020 >60     Calcium 12/14/2020 9.3     Total Protein 12/14/2020 6.9     Alb 12/14/2020 4.4     Albumin/Globulin Ratio 12/14/2020 1.8     Total Bilirubin 12/14/2020 0.5     Alkaline Phosphatase 12/14/2020 125     ALT 12/14/2020 21     AST 12/14/2020 18  Globulin 12/14/2020 2.5     Cholesterol, Total 12/14/2020 176     Triglycerides 12/14/2020 87     HDL 12/14/2020 50     LDL Calculated 12/14/2020 109*    VLDL Cholesterol Calcula* 12/14/2020 17        Imaging:  I have reviewed the below testing personally:    EKG 12/22/20  SR  WNL    2011  SR, Low voltage    SPECT 1/28/11  FINDINGS-     The stress and rest tomographic views show normal radionuclide distribution throughout the myocardium. There is no evidence of ischemia or infarction. The wall motion is normal and the left ventricular ejection fraction is calculated at 70%. The patient exercised for 7 minutes and 30 seconds achieving an age-predicted heart rate of 90%. GXT 2/17/20   Rest   ECG   Normal sinus rhythm. Stress   Stress Type: Exercise      Stress Protocol: Antolin      Rest HR: 79 bpm                             HR BP Product: [id number]   Rest BP: 127/88 mmHg                        Max Exercise: 7 METS   Stress Peak HR: 137 bpm   Stress Peak BP: 156/71 mmHg   Predicted HR: 156 bpm   % of predicted HR: 88   Test Duration: 6 min   Reason for Termination: Physiologic Maximum      Results      ECG   Normal sinus rhythm. Normal electrocardiographic response to exercise with less than 1.0 mm of up   sloping ST depression. Symptoms   There was stress induced lightheadedness. Patient had \"muscular\" chest discomfort 7/10 before stress with no change   noted.    Symptoms resolved with rest.      Impression/Recommendations    Mr. Clarita Dela Cruz is a 72 y.o. male patient with:    Chest pain, atypical   Hyperlipidemia, stable (12/2020:  TG 87 HDL 50 )  Borderline diabetes mellitus     The 10-year ASCVD risk score (Sherine Barrientos, et al., 2013) is: 8.5%    Values used to calculate the score:      Age: 72 years      Sex: Male      Is Non- : Yes      Diabetic: No      Tobacco smoker: No      Systolic Blood Pressure: 030 mmHg      Is BP treated: No HDL Cholesterol: 50 mg/dL      Total Cholesterol: 176 mg/dL    Discussed normal stress testing from 2011 and 2020. He is with recurrent chest and left arm symptoms with atypical features. Discussed intermediate ASCVD risk profile as well as potential need for cardiac clearance should he pursue surgical options for hernia or spine. He is agreeable to echocardiogram and coronary CTA for further evaluation. Orders Placed This Encounter   Procedures    CTA CARDIAC W C STRC MORP W CONTRAST     Standing Status:   Future     Standing Expiration Date:   12/22/2021     Order Specific Question:   Reason for exam:     Answer:   chest pain, hld, dizziness    EKG 12 lead     Order Specific Question:   Reason for Exam?     Answer:   Chest pain    Echo 2D w doppler w color complete     Standing Status:   Future     Standing Expiration Date:   12/22/2021     Order Specific Question:   Reason for exam:     Answer:   cp, dizziness     Return for Testing. Patient Instructions   Echocardiogram   Coronary CTA    Take Metoprolol Tartrate 50 mg for 4 doses twice daily prior to Coronary CTA (One tablet in the evening two days prior, twice daily the day before, and one tablet the morning of)  Bring an extra tablet with you to the test    Thank you for allowing me to participate in the care of your patient. Please do not hesitate to call. Addis Zelaya DO, [facility]  Interventional Cardiology     o: [phone]  [facility] [address], [address] [facility]      NOTE:  This report was transcribed using voice recognition software. Every effort was made to ensure accuracy; however, inadvertent computerized transcription errors may be present. Scribe's Attestation: This note was scribed in the presence of Dr. Christopher Holcomb DO by Krishna Moffett RN. Yoselin Romero, have personally performed the services described in this documentation as scribed by Ami Goltz. CARI Toledo in my presence, and it is both accurate and complete. An electronic signature was used to authenticate this note.          [facility] is no longer - spoke to patient and patient stated to send to [facility] on [address] - RX was sent to the pharmacy of patient choose       Sincerely,        Liseth Devries, DO

## 2021-01-04 ENCOUNTER — OFFICE VISIT (OUTPATIENT)
Dept: ORTHOPEDIC SURGERY | Age: 66
End: 2021-01-04
Payer: MEDICAID

## 2021-01-04 VITALS — WEIGHT: 191 LBS | TEMPERATURE: 96.8 F | HEIGHT: 67 IN | BODY MASS INDEX: 29.98 KG/M2

## 2021-01-04 DIAGNOSIS — M16.31 OSTEOARTHRITIS RESULTING FROM RIGHT HIP DYSPLASIA: ICD-10-CM

## 2021-01-04 DIAGNOSIS — M25.552 BILATERAL HIP PAIN: ICD-10-CM

## 2021-01-04 DIAGNOSIS — M25.551 BILATERAL HIP PAIN: ICD-10-CM

## 2021-01-04 DIAGNOSIS — S73.191A TEAR OF RIGHT ACETABULAR LABRUM, INITIAL ENCOUNTER: Primary | ICD-10-CM

## 2021-01-04 DIAGNOSIS — M25.859 FEMORAL ACETABULAR IMPINGEMENT: ICD-10-CM

## 2021-01-04 PROCEDURE — 99203 OFFICE O/P NEW LOW 30 MIN: CPT | Performed by: INTERNAL MEDICINE

## 2021-01-04 RX ORDER — MELOXICAM 15 MG/1
15 TABLET ORAL DAILY
Qty: 30 TABLET | Refills: 1 | Status: SHIPPED | OUTPATIENT
Start: 2021-01-04 | End: 2021-03-15

## 2021-01-04 NOTE — PROGRESS NOTES
Chief Complaint:   Chief Complaint   Patient presents with    Hip Pain     Bilateral hip pain 7/10 today.  Foot Pain     Knot on bottom of foot right side that is bothersome, patient would like to discuss          History of Present Illness:       Patient is a 72 y.o. male presents with the above complaint. The symptoms began 1 yearsago and started without an injury. He is seen in consultation at request of Dr. Jose Carlos Carrillo. The patient describes a aching pain and stiffness that does not radiate. The symptoms are constant  and are are worsening since the onset. Pain localizes anterior/lateral aspects and  does not seems to follow  provacative pattern suggestive of greater trochanteric pain syndrome. There are not predictable mechanical symptoms that are active at this time. .  The patient denies subjective instability about the hip and denies new onset or progressive weakness of the lower extremity. Pain level 7    The patient denies a pattern of activity related swelling. Treatment to date:NSAIDS OTC Motrin with mild improvement    There is no prior history of hip trauma. There is no prior history of autoimmune disease, autoimmune disease, or crystal arthropathy. Past Medical History:        Past Medical History:   Diagnosis Date    Hyperlipidemia 12/21/2020         Past Surgical History:   Procedure Laterality Date    COLONOSCOPY  05/04/2018         Present Medications:         Current Outpatient Medications   Medication Sig Dispense Refill    meloxicam (MOBIC) 15 MG tablet Take 1 tablet by mouth daily 30 tablet 1    metoprolol tartrate (LOPRESSOR) 50 MG tablet Take one tablet twice daily for 4 doses prior to Coronary CTA 5 tablet 0    latanoprost (XALATAN) 0.005 % ophthalmic solution        No current facility-administered medications for this visit.           Allergies:      No Known Allergies     Social History:         Social History     Socioeconomic History  Marital status:      Spouse name: Not on file    Number of children: Not on file    Years of education: Not on file    Highest education level: Not on file   Occupational History    Not on file   Social Needs    Financial resource strain: Not on file    Food insecurity     Worry: Not on file     Inability: Not on file    Transportation needs     Medical: Not on file     Non-medical: Not on file   Tobacco Use    Smoking status: Never Smoker    Smokeless tobacco: Never Used   Substance and Sexual Activity    Alcohol use: No     Alcohol/week: 0.8 standard drinks     Types: 1 Standard drinks or equivalent per week    Drug use: No    Sexual activity: Not on file   Lifestyle    Physical activity     Days per week: Not on file     Minutes per session: Not on file    Stress: Not on file   Relationships    Social connections     Talks on phone: Not on file     Gets together: Not on file     Attends Jewish service: Not on file     Active member of club or organization: Not on file     Attends meetings of clubs or organizations: Not on file     Relationship status: Not on file    Intimate partner violence     Fear of current or ex partner: Not on file     Emotionally abused: Not on file     Physically abused: Not on file     Forced sexual activity: Not on file   Other Topics Concern    Not on file   Social History Narrative    Not on file        Review of Symptoms:    Pertinent items are noted in HPI    Review of systems reviewed from Patient History Form dated on today's date and   available in the patient's chart under the Media tab. Vital Signs:      Vitals:    01/04/21 0956   Temp: 96.8 °F (36 °C)        General Exam:     Constitutional: Patient is adequately groomed with no evidence of malnutrition  Mental Status: The patient is oriented to time, place and person. The patient's mood and affect are appropriate. Vascular: Examination reveals no swelling or calf tenderness. Peripheral pulses are palpable and 2+. Lymphatics: no lymphadenopathy of the inguinal region or lower extremity      Physical Exam: bilateral hip      Primary Exam:    Inspection: No deformity atrophy appreciable effusion      Palpation: There is no focal tenderness      Range of Motion: Marked symmetric restriction in internal rotation near full range in flexion external rotation 35 to 40 degrees pain with endrange flexion and internal rotation much greater on the right than left      Strength: Normal with SLR      Special Tests: FADIR positive for pain right, Stinchfield suggestive on the right negative on the left      Skin: There are no rashes, ulcerations or lesions. Gait: Nonantalgic      Reflex intact lower     Additional Comments:        Additional Examinations:           Neurolgic -Light touch sensation and manual muscle testing normal L2-S1. No fasiculations. Pattella tendon and Achilles tendon reflexes +2 bilaterally. Seated SLR negative           Office Imaging Results/Procedures PerformedToday:      Radiology:      X-rays obtained and reviewed in office:   Views 2 views bilateral hips   Location bilateral hips   Impression stigmata of mixed HARISH and moderate femoral acetabular joint degenerative change Tonnis grade 1 degenerative change. Proximal trabecular pattern of the femur is normal.  There are enthesopathic changes in the regions of ASIS and superior lateral iliac crest and ischial tuberosities right greater than left and lesser trochanter right greater than left.   No other soft tissue or osseous abnormalities    X Ray Measure  Right Hip Left Hip    Center Edge Angle (CEA)  48    Alpha Angle (degrees for CAM)  78    Tonnis Grade (0-4)  1    Joint Space (mm)  4            Office Procedures:     Orders Placed This Encounter   Procedures    XR HIP BILATERAL W AP PELVIS (2 VIEWS)     Standing Status:   Future Number of Occurrences:   1     Standing Expiration Date:   1/4/2022    MRI HIP RIGHT WO CONTRAST     Standing Status:   Future     Standing Expiration Date:   1/4/2022     Scheduling Instructions:      Ramiro Rogers, please contact patient to schedule. Advised patient to contact to schedule, and call to schedule f/u with      Dr. Burgess Sparks 2 days after. Order Specific Question:   Reason for exam:     Answer:   HARISH, R/O Labral tear/OCD Lesion AVN           Other Outside Imaging and Testing Personally Reviewed:    Xr Hip Bilateral W Ap Pelvis (2 Views)    Result Date: 1/4/2021  Radiology exam is complete. No Radiologist dictation. Please follow up with ordering provider. Assessment   Impression: . Encounter Diagnoses   Name Primary?  Tear of right acetabular labrum, initial encounter Yes    Osteoarthritis resulting from right hip dysplasia     Femoral acetabular impingement     Bilateral hip pain               Plan:     Evaluation right hip evaluate severity of hip chondropathy/labral pathology suspected clinically rule out AVN  Consider ultrasound-guided intervention injection as needed pending results of MRI  To modification caution against overuse trial of NSAIDs-meloxicam GI precaution  Evaluation of his left foot on follow-up      The nature of the finding, probable diagnosis and likely treatment was thoroughly discussed with the patient. The options, risks, complications, alternative treatment as well as some of the differential diagnosis was discussed. The patient was thoroughly informed and all questions were answered. the patient indicated understanding and satisfaction with the discussion.       Orders:        Orders Placed This Encounter   Procedures    XR HIP BILATERAL W AP PELVIS (2 VIEWS)     Standing Status:   Future     Number of Occurrences:   1     Standing Expiration Date:   1/4/2022    MRI HIP RIGHT WO CONTRAST     Standing Status:   Future Standing Expiration Date:   1/4/2022     Scheduling Instructions:      Kathryn Matt, please contact patient to schedule. Advised patient to contact to schedule, and call to schedule f/u with      Dr. Gale Huizar 2 days after. Order Specific Question:   Reason for exam:     Answer:   HARISH, R/O Labral tear/OCD Lesion AVN           Disclaimer: \"This note was dictated with voice recognition software. Though review and correction are routine, we apologize for any errors. \"

## 2021-01-05 ENCOUNTER — HOSPITAL ENCOUNTER (OUTPATIENT)
Dept: NON INVASIVE DIAGNOSTICS | Age: 66
Discharge: HOME | DRG: 166 | End: 2021-01-05
Payer: MEDICAID

## 2021-01-05 ENCOUNTER — HOSPITAL ENCOUNTER (OUTPATIENT)
Dept: CT IMAGING | Age: 66
Discharge: HOME | DRG: 166 | End: 2021-01-05
Payer: MEDICAID

## 2021-01-05 VITALS
DIASTOLIC BLOOD PRESSURE: 67 MMHG | SYSTOLIC BLOOD PRESSURE: 148 MMHG | OXYGEN SATURATION: 97 % | BODY MASS INDEX: 29.66 KG/M2 | WEIGHT: 189 LBS | HEART RATE: 75 BPM | TEMPERATURE: 97.4 F | HEIGHT: 67 IN | RESPIRATION RATE: 16 BRPM

## 2021-01-05 DIAGNOSIS — R07.9 CHEST PAIN, UNSPECIFIED TYPE: ICD-10-CM

## 2021-01-05 DIAGNOSIS — R42 DIZZINESS: ICD-10-CM

## 2021-01-05 DIAGNOSIS — E78.5 HYPERLIPIDEMIA, UNSPECIFIED HYPERLIPIDEMIA TYPE: ICD-10-CM

## 2021-01-05 LAB
LV EF: 55 %
LVEF MODALITY: NORMAL

## 2021-01-05 PROCEDURE — 75574 CT ANGIO HRT W/3D IMAGE: CPT

## 2021-01-05 PROCEDURE — 93306 TTE W/DOPPLER COMPLETE: CPT

## 2021-01-05 PROCEDURE — 2500000003 HC RX 250 WO HCPCS: Performed by: RADIOLOGY

## 2021-01-05 PROCEDURE — 6360000004 HC RX CONTRAST MEDICATION: Performed by: INTERNAL MEDICINE

## 2021-01-05 PROCEDURE — 6370000000 HC RX 637 (ALT 250 FOR IP): Performed by: RADIOLOGY

## 2021-01-05 RX ORDER — METOPROLOL TARTRATE 50 MG/1
100 TABLET, FILM COATED ORAL ONCE
Status: COMPLETED | OUTPATIENT
Start: 2021-01-05 | End: 2021-01-05

## 2021-01-05 RX ORDER — METOPROLOL TARTRATE 5 MG/5ML
5 INJECTION INTRAVENOUS EVERY 5 MIN PRN
Status: DISCONTINUED | OUTPATIENT
Start: 2021-01-05 | End: 2021-01-06 | Stop reason: HOSPADM

## 2021-01-05 RX ORDER — NITROGLYCERIN 0.4 MG/1
0.4 TABLET SUBLINGUAL ONCE
Status: COMPLETED | OUTPATIENT
Start: 2021-01-05 | End: 2021-01-05

## 2021-01-05 RX ADMIN — NITROGLYCERIN 0.4 MG: 0.4 TABLET SUBLINGUAL at 09:40

## 2021-01-05 RX ADMIN — METOPROLOL TARTRATE 100 MG: 50 TABLET, FILM COATED ORAL at 08:45

## 2021-01-05 RX ADMIN — IOPAMIDOL 90 ML: 755 INJECTION, SOLUTION INTRAVENOUS at 09:28

## 2021-01-05 RX ADMIN — METOPROLOL TARTRATE 5 MG: 5 INJECTION INTRAVENOUS at 09:18

## 2021-01-05 NOTE — RESULT ENCOUNTER NOTE
Multiple attempts made to contact Daniele Rosas regarding coronary CTA. Per [facility] needs C. Mobile number mailbox full and wifes cell number disconnected. Will continue to try and contact.

## 2021-01-06 ENCOUNTER — OFFICE VISIT (OUTPATIENT)
Dept: SURGERY | Age: 66
End: 2021-01-06
Payer: MEDICAID

## 2021-01-06 VITALS
SYSTOLIC BLOOD PRESSURE: 122 MMHG | TEMPERATURE: 96.8 F | DIASTOLIC BLOOD PRESSURE: 68 MMHG | WEIGHT: 191 LBS | BODY MASS INDEX: 29.91 KG/M2

## 2021-01-06 DIAGNOSIS — R10.31 GROIN PAIN, RIGHT: Primary | ICD-10-CM

## 2021-01-06 DIAGNOSIS — I20.9 ANGINAL PAIN (HCC): ICD-10-CM

## 2021-01-06 DIAGNOSIS — I25.118 CORONARY ARTERY DISEASE INVOLVING NATIVE CORONARY ARTERY OF NATIVE HEART WITH OTHER FORM OF ANGINA PECTORIS (HCC): ICD-10-CM

## 2021-01-06 DIAGNOSIS — R07.9 CHEST PAIN, UNSPECIFIED TYPE: Primary | ICD-10-CM

## 2021-01-06 DIAGNOSIS — R42 DIZZINESS: ICD-10-CM

## 2021-01-06 PROCEDURE — 99203 OFFICE O/P NEW LOW 30 MIN: CPT | Performed by: SURGERY

## 2021-01-06 ASSESSMENT — ENCOUNTER SYMPTOMS
GASTROINTESTINAL NEGATIVE: 1
RESPIRATORY NEGATIVE: 1
EYES NEGATIVE: 1
ALLERGIC/IMMUNOLOGIC NEGATIVE: 1

## 2021-01-06 NOTE — PROGRESS NOTES
Active member of club or organization: Not on file     Attends meetings of clubs or organizations: Not on file     Relationship status: Not on file    Intimate partner violence     Fear of current or ex partner: Not on file     Emotionally abused: Not on file     Physically abused: Not on file     Forced sexual activity: Not on file   Other Topics Concern    Not on file   Social History Narrative    Not on file       Review of Systems   Constitutional: Negative. HENT: Negative. Eyes: Negative. Respiratory: Negative. Cardiovascular: Negative. Gastrointestinal: Negative. Endocrine: Negative. Genitourinary: Negative. Musculoskeletal: Negative. Skin: Negative. Allergic/Immunologic: Negative. Neurological: Negative. Hematological: Negative. Psychiatric/Behavioral: Negative.        :   Physical Exam  Constitutional:       Appearance: He is well-developed. HENT:      Head: Normocephalic and atraumatic. Right Ear: External ear normal.      Left Ear: External ear normal.   Eyes:      Conjunctiva/sclera: Conjunctivae normal.   Neck:      Musculoskeletal: Normal range of motion and neck supple. Cardiovascular:      Rate and Rhythm: Normal rate and regular rhythm. Pulmonary:      Effort: Pulmonary effort is normal.      Breath sounds: Normal breath sounds. Abdominal:      General: There is no distension. Palpations: Abdomen is soft. Comments: No evidence of an inguinal hernia bilaterally   Musculoskeletal: Normal range of motion. Skin:     General: Skin is warm and dry. Neurological:      Mental Status: He is alert and oriented to person, place, and time.    Psychiatric:         Behavior: Behavior normal.       Temp 96.8 °F (36 °C)   Wt 191 lb (86.6 kg)   BMI 29.91 kg/m²     : 20-year-old male who presents for evaluation of a 3 to 4-month history of right groin discomfort. The discomfort occurs mainly when bending over. On physical examination, there is no evidence of an inguinal hernia bilaterally. His pain is most likely secondary to a chronic groin strain. Plan:      Recommended anti-inflammatories, heat and rest.  If his symptoms persist or worsen, he may benefit from physical therapy. He will follow-up with me as needed.

## 2021-01-06 NOTE — LETTER
[address] [address]  Phone: [phone]  Fax: [phone]    January 6, 2021    Patient: Latoya Mota  MRN:  [id number]  YOB: 1955  Date of Visit: 1/6/2021    Dear Dr [facility]: Thank you for the request for consultation for Latoya Mota. Below are the relevant portions of my assessment and plan of care. Assessment:  43-year-old male who presents for evaluation of a 3 to 4-month history of right groin discomfort. The discomfort occurs mainly when bending over. On physical examination, there is no evidence of an inguinal hernia bilaterally. His pain is most likely secondary to a chronic groin strain. Plan:  Recommended anti-inflammatories, heat and rest.  If his symptoms persist or worsen, he may benefit from physical therapy. He will follow-up with me as needed. If you have questions, please do not hesitate to call me. I look forward to following [facility] along with you.     Sincerely,    Rani Michael MD    CC providers:    Rodger Skiff, MD [phone]  [address] [employer]

## 2021-01-07 ENCOUNTER — APPOINTMENT (OUTPATIENT)
Dept: GENERAL RADIOLOGY | Age: 66
DRG: 166 | End: 2021-01-07
Attending: INTERNAL MEDICINE
Payer: MEDICAID

## 2021-01-07 ENCOUNTER — ANESTHESIA EVENT (OUTPATIENT)
Dept: OPERATING ROOM | Age: 66
DRG: 166 | End: 2021-01-07
Payer: MEDICAID

## 2021-01-07 ENCOUNTER — HOSPITAL ENCOUNTER (INPATIENT)
Dept: CARDIAC CATH/INVASIVE PROCEDURES | Age: 66
LOS: 8 days | Discharge: HOME HEALTH | DRG: 166 | End: 2021-01-15
Attending: INTERNAL MEDICINE | Admitting: INTERNAL MEDICINE
Payer: MEDICAID

## 2021-01-07 DIAGNOSIS — Z95.1 S/P CABG X 5: Primary | ICD-10-CM

## 2021-01-07 LAB
A/G RATIO: 1.3 (ref 1.1–2.2)
ABO/RH: NORMAL
ALBUMIN SERPL-MCNC: 3.4 G/DL (ref 3.4–5)
ALP BLD-CCNC: 92 U/L (ref 40–129)
ALT SERPL-CCNC: 30 U/L (ref 10–40)
ANION GAP SERPL CALCULATED.3IONS-SCNC: 6 MMOL/L (ref 3–16)
ANION GAP SERPL CALCULATED.3IONS-SCNC: 7 MMOL/L (ref 3–16)
ANTIBODY SCREEN: NORMAL
AST SERPL-CCNC: 21 U/L (ref 15–37)
BASE EXCESS ARTERIAL: -0.3 MMOL/L (ref -3–3)
BASOPHILS ABSOLUTE: 0 K/UL (ref 0–0.2)
BASOPHILS ABSOLUTE: 0 K/UL (ref 0–0.2)
BASOPHILS RELATIVE PERCENT: 0.4 %
BASOPHILS RELATIVE PERCENT: 0.6 %
BILIRUB SERPL-MCNC: 0.5 MG/DL (ref 0–1)
BILIRUBIN URINE: NEGATIVE
BLOOD, URINE: NEGATIVE
BUN BLDV-MCNC: 11 MG/DL (ref 7–20)
BUN BLDV-MCNC: 12 MG/DL (ref 7–20)
CALCIUM SERPL-MCNC: 8.3 MG/DL (ref 8.3–10.6)
CALCIUM SERPL-MCNC: 9.5 MG/DL (ref 8.3–10.6)
CARBOXYHEMOGLOBIN ARTERIAL: 1 % (ref 0–1.5)
CHLORIDE BLD-SCNC: 102 MMOL/L (ref 99–110)
CHLORIDE BLD-SCNC: 103 MMOL/L (ref 99–110)
CHOLESTEROL, TOTAL: 166 MG/DL (ref 0–199)
CLARITY: CLEAR
CO2: 24 MMOL/L (ref 21–32)
CO2: 28 MMOL/L (ref 21–32)
COLOR: YELLOW
CREAT SERPL-MCNC: 0.7 MG/DL (ref 0.8–1.3)
CREAT SERPL-MCNC: 1 MG/DL (ref 0.8–1.3)
EKG ATRIAL RATE: 77 BPM
EKG DIAGNOSIS: NORMAL
EKG P AXIS: 58 DEGREES
EKG P-R INTERVAL: 146 MS
EKG Q-T INTERVAL: 368 MS
EKG QRS DURATION: 72 MS
EKG QTC CALCULATION (BAZETT): 416 MS
EKG R AXIS: 0 DEGREES
EKG T AXIS: 25 DEGREES
EKG VENTRICULAR RATE: 77 BPM
EOSINOPHILS ABSOLUTE: 0 K/UL (ref 0–0.6)
EOSINOPHILS ABSOLUTE: 0.1 K/UL (ref 0–0.6)
EOSINOPHILS RELATIVE PERCENT: 0.5 %
EOSINOPHILS RELATIVE PERCENT: 0.9 %
GFR AFRICAN AMERICAN: >60
GFR AFRICAN AMERICAN: >60
GFR NON-AFRICAN AMERICAN: >60
GFR NON-AFRICAN AMERICAN: >60
GLOBULIN: 2.7 G/DL
GLUCOSE BLD-MCNC: 96 MG/DL (ref 70–99)
GLUCOSE BLD-MCNC: 97 MG/DL (ref 70–99)
GLUCOSE URINE: NEGATIVE MG/DL
HCO3 ARTERIAL: 25.7 MMOL/L (ref 21–29)
HCT VFR BLD CALC: 36.3 % (ref 40.5–52.5)
HCT VFR BLD CALC: 41.5 % (ref 40.5–52.5)
HDLC SERPL-MCNC: 38 MG/DL (ref 40–60)
HEMOGLOBIN, ART, EXTENDED: 12.2 G/DL (ref 13.5–17.5)
HEMOGLOBIN: 11.9 G/DL (ref 13.5–17.5)
HEMOGLOBIN: 13.3 G/DL (ref 13.5–17.5)
INR BLD: 1.13 (ref 0.86–1.14)
KETONES, URINE: NEGATIVE MG/DL
LDL CHOLESTEROL CALCULATED: 113 MG/DL
LEFT VENTRICULAR EJECTION FRACTION MODE: NORMAL
LEUKOCYTE ESTERASE, URINE: NEGATIVE
LV EF: 65 %
LYMPHOCYTES ABSOLUTE: 1.9 K/UL (ref 1–5.1)
LYMPHOCYTES ABSOLUTE: 2 K/UL (ref 1–5.1)
LYMPHOCYTES RELATIVE PERCENT: 32.4 %
LYMPHOCYTES RELATIVE PERCENT: 33.5 %
MCH RBC QN AUTO: 29.5 PG (ref 26–34)
MCH RBC QN AUTO: 30.4 PG (ref 26–34)
MCHC RBC AUTO-ENTMCNC: 32 G/DL (ref 31–36)
MCHC RBC AUTO-ENTMCNC: 32.9 G/DL (ref 31–36)
MCV RBC AUTO: 92 FL (ref 80–100)
MCV RBC AUTO: 92.5 FL (ref 80–100)
METHEMOGLOBIN ARTERIAL: 0.1 %
MICROSCOPIC EXAMINATION: NORMAL
MONOCYTES ABSOLUTE: 0.4 K/UL (ref 0–1.3)
MONOCYTES ABSOLUTE: 0.4 K/UL (ref 0–1.3)
MONOCYTES RELATIVE PERCENT: 6.7 %
MONOCYTES RELATIVE PERCENT: 7 %
NEUTROPHILS ABSOLUTE: 3.4 K/UL (ref 1.7–7.7)
NEUTROPHILS ABSOLUTE: 3.5 K/UL (ref 1.7–7.7)
NEUTROPHILS RELATIVE PERCENT: 58.5 %
NEUTROPHILS RELATIVE PERCENT: 59.5 %
NITRITE, URINE: NEGATIVE
O2 CONTENT ARTERIAL: 16 ML/DL
O2 SAT, ARTERIAL: 96.4 %
O2 THERAPY: ABNORMAL
PCO2 ARTERIAL: 46.7 MMHG (ref 35–45)
PDW BLD-RTO: 13.7 % (ref 12.4–15.4)
PDW BLD-RTO: 14 % (ref 12.4–15.4)
PH ARTERIAL: 7.35 (ref 7.35–7.45)
PH UA: 7 (ref 5–8)
PLATELET # BLD: 248 K/UL (ref 135–450)
PLATELET # BLD: 261 K/UL (ref 135–450)
PMV BLD AUTO: 7.5 FL (ref 5–10.5)
PMV BLD AUTO: 7.9 FL (ref 5–10.5)
PO2 ARTERIAL: 80.4 MMHG (ref 75–108)
POC ACT LR: 260 SEC
POTASSIUM SERPL-SCNC: 3.4 MMOL/L (ref 3.5–5.1)
POTASSIUM SERPL-SCNC: 4 MMOL/L (ref 3.5–5.1)
PROTEIN UA: NEGATIVE MG/DL
PROTHROMBIN TIME: 13.1 SEC (ref 10–13.2)
RBC # BLD: 3.93 M/UL (ref 4.2–5.9)
RBC # BLD: 4.52 M/UL (ref 4.2–5.9)
SARS-COV-2, NAAT: NOT DETECTED
SODIUM BLD-SCNC: 133 MMOL/L (ref 136–145)
SODIUM BLD-SCNC: 137 MMOL/L (ref 136–145)
SPECIFIC GRAVITY UA: >1.03 (ref 1–1.03)
TCO2 ARTERIAL: 60.9 MMOL/L
TOTAL PROTEIN: 6.1 G/DL (ref 6.4–8.2)
TRIGL SERPL-MCNC: 77 MG/DL (ref 0–150)
URINE REFLEX TO CULTURE: NORMAL
URINE TYPE: NORMAL
UROBILINOGEN, URINE: 0.2 E.U./DL
VLDLC SERPL CALC-MCNC: 15 MG/DL
WBC # BLD: 5.8 K/UL (ref 4–11)
WBC # BLD: 6 K/UL (ref 4–11)

## 2021-01-07 PROCEDURE — 6360000002 HC RX W HCPCS

## 2021-01-07 PROCEDURE — 2709999900 HC NON-CHARGEABLE SUPPLY

## 2021-01-07 PROCEDURE — U0002 COVID-19 LAB TEST NON-CDC: HCPCS

## 2021-01-07 PROCEDURE — 36415 COLL VENOUS BLD VENIPUNCTURE: CPT

## 2021-01-07 PROCEDURE — 93005 ELECTROCARDIOGRAM TRACING: CPT | Performed by: INTERNAL MEDICINE

## 2021-01-07 PROCEDURE — 85347 COAGULATION TIME ACTIVATED: CPT

## 2021-01-07 PROCEDURE — 71045 X-RAY EXAM CHEST 1 VIEW: CPT

## 2021-01-07 PROCEDURE — 99153 MOD SED SAME PHYS/QHP EA: CPT

## 2021-01-07 PROCEDURE — 6370000000 HC RX 637 (ALT 250 FOR IP): Performed by: NURSE PRACTITIONER

## 2021-01-07 PROCEDURE — 82803 BLOOD GASES ANY COMBINATION: CPT

## 2021-01-07 PROCEDURE — B2151ZZ FLUOROSCOPY OF LEFT HEART USING LOW OSMOLAR CONTRAST: ICD-10-PCS | Performed by: INTERNAL MEDICINE

## 2021-01-07 PROCEDURE — 81003 URINALYSIS AUTO W/O SCOPE: CPT

## 2021-01-07 PROCEDURE — C1769 GUIDE WIRE: HCPCS

## 2021-01-07 PROCEDURE — 85610 PROTHROMBIN TIME: CPT

## 2021-01-07 PROCEDURE — 83036 HEMOGLOBIN GLYCOSYLATED A1C: CPT

## 2021-01-07 PROCEDURE — 4A023N7 MEASUREMENT OF CARDIAC SAMPLING AND PRESSURE, LEFT HEART, PERCUTANEOUS APPROACH: ICD-10-PCS | Performed by: INTERNAL MEDICINE

## 2021-01-07 PROCEDURE — C1887 CATHETER, GUIDING: HCPCS

## 2021-01-07 PROCEDURE — 6370000000 HC RX 637 (ALT 250 FOR IP): Performed by: INTERNAL MEDICINE

## 2021-01-07 PROCEDURE — 2100000000 HC CCU R&B

## 2021-01-07 PROCEDURE — C1894 INTRO/SHEATH, NON-LASER: HCPCS

## 2021-01-07 PROCEDURE — 93010 ELECTROCARDIOGRAM REPORT: CPT | Performed by: INTERNAL MEDICINE

## 2021-01-07 PROCEDURE — APPNB30 APP NON BILLABLE TIME 0-30 MINS: Performed by: NURSE PRACTITIONER

## 2021-01-07 PROCEDURE — 99152 MOD SED SAME PHYS/QHP 5/>YRS: CPT | Performed by: INTERNAL MEDICINE

## 2021-01-07 PROCEDURE — 94010 BREATHING CAPACITY TEST: CPT

## 2021-01-07 PROCEDURE — 93880 EXTRACRANIAL BILAT STUDY: CPT

## 2021-01-07 PROCEDURE — 85025 COMPLETE CBC W/AUTO DIFF WBC: CPT

## 2021-01-07 PROCEDURE — P9016 RBC LEUKOCYTES REDUCED: HCPCS

## 2021-01-07 PROCEDURE — 2500000003 HC RX 250 WO HCPCS

## 2021-01-07 PROCEDURE — 93458 L HRT ARTERY/VENTRICLE ANGIO: CPT | Performed by: INTERNAL MEDICINE

## 2021-01-07 PROCEDURE — 99222 1ST HOSP IP/OBS MODERATE 55: CPT | Performed by: INTERNAL MEDICINE

## 2021-01-07 PROCEDURE — 86923 COMPATIBILITY TEST ELECTRIC: CPT

## 2021-01-07 PROCEDURE — 80061 LIPID PANEL: CPT

## 2021-01-07 PROCEDURE — 99152 MOD SED SAME PHYS/QHP 5/>YRS: CPT

## 2021-01-07 PROCEDURE — 86900 BLOOD TYPING SEROLOGIC ABO: CPT

## 2021-01-07 PROCEDURE — 93970 EXTREMITY STUDY: CPT

## 2021-01-07 PROCEDURE — 80053 COMPREHEN METABOLIC PANEL: CPT

## 2021-01-07 PROCEDURE — 93458 L HRT ARTERY/VENTRICLE ANGIO: CPT

## 2021-01-07 PROCEDURE — 6360000004 HC RX CONTRAST MEDICATION: Performed by: INTERNAL MEDICINE

## 2021-01-07 PROCEDURE — APPSS15 APP SPLIT SHARED TIME 0-15 MINUTES: Performed by: NURSE PRACTITIONER

## 2021-01-07 PROCEDURE — 86850 RBC ANTIBODY SCREEN: CPT

## 2021-01-07 PROCEDURE — 86901 BLOOD TYPING SEROLOGIC RH(D): CPT

## 2021-01-07 PROCEDURE — 6360000002 HC RX W HCPCS: Performed by: INTERNAL MEDICINE

## 2021-01-07 RX ORDER — SODIUM CHLORIDE 0.9 % (FLUSH) 0.9 %
10 SYRINGE (ML) INJECTION EVERY 12 HOURS SCHEDULED
Status: DISCONTINUED | OUTPATIENT
Start: 2021-01-07 | End: 2021-01-11

## 2021-01-07 RX ORDER — HEPARIN SODIUM 1000 [USP'U]/ML
2000 INJECTION, SOLUTION INTRAVENOUS; SUBCUTANEOUS PRN
Status: DISCONTINUED | OUTPATIENT
Start: 2021-01-07 | End: 2021-01-11

## 2021-01-07 RX ORDER — ACETAMINOPHEN 325 MG/1
650 TABLET ORAL EVERY 4 HOURS PRN
Status: DISCONTINUED | OUTPATIENT
Start: 2021-01-07 | End: 2021-01-08 | Stop reason: SDUPTHER

## 2021-01-07 RX ORDER — LATANOPROST 50 UG/ML
1 SOLUTION/ DROPS OPHTHALMIC NIGHTLY
Status: DISCONTINUED | OUTPATIENT
Start: 2021-01-07 | End: 2021-01-15 | Stop reason: HOSPADM

## 2021-01-07 RX ORDER — SODIUM CHLORIDE 9 MG/ML
INJECTION, SOLUTION INTRAVENOUS CONTINUOUS
Status: DISCONTINUED | OUTPATIENT
Start: 2021-01-08 | End: 2021-01-11

## 2021-01-07 RX ORDER — HEPARIN SODIUM 10000 [USP'U]/100ML
11.54 INJECTION, SOLUTION INTRAVENOUS CONTINUOUS
Status: DISCONTINUED | OUTPATIENT
Start: 2021-01-07 | End: 2021-01-11

## 2021-01-07 RX ORDER — SODIUM CHLORIDE 0.9 % (FLUSH) 0.9 %
10 SYRINGE (ML) INJECTION PRN
Status: DISCONTINUED | OUTPATIENT
Start: 2021-01-07 | End: 2021-01-11

## 2021-01-07 RX ORDER — PANTOPRAZOLE SODIUM 40 MG/10ML
40 INJECTION, POWDER, LYOPHILIZED, FOR SOLUTION INTRAVENOUS
Status: COMPLETED | OUTPATIENT
Start: 2021-01-08 | End: 2021-01-08

## 2021-01-07 RX ORDER — ALPRAZOLAM 0.25 MG/1
0.25 TABLET ORAL
Status: COMPLETED | OUTPATIENT
Start: 2021-01-07 | End: 2021-01-07

## 2021-01-07 RX ORDER — CHLORHEXIDINE GLUCONATE 4 G/100ML
SOLUTION TOPICAL SEE ADMIN INSTRUCTIONS
Status: DISCONTINUED | OUTPATIENT
Start: 2021-01-07 | End: 2021-01-08 | Stop reason: HOSPADM

## 2021-01-07 RX ORDER — HEPARIN SODIUM 1000 [USP'U]/ML
4000 INJECTION, SOLUTION INTRAVENOUS; SUBCUTANEOUS PRN
Status: DISCONTINUED | OUTPATIENT
Start: 2021-01-07 | End: 2021-01-11

## 2021-01-07 RX ORDER — GABAPENTIN 300 MG/1
600 CAPSULE ORAL ONCE
Status: COMPLETED | OUTPATIENT
Start: 2021-01-08 | End: 2021-01-08

## 2021-01-07 RX ORDER — ATORVASTATIN CALCIUM 40 MG/1
40 TABLET, FILM COATED ORAL NIGHTLY
Status: DISCONTINUED | OUTPATIENT
Start: 2021-01-07 | End: 2021-01-08 | Stop reason: SDUPTHER

## 2021-01-07 RX ADMIN — METOPROLOL TARTRATE 25 MG: 25 TABLET, FILM COATED ORAL at 21:33

## 2021-01-07 RX ADMIN — HEPARIN SODIUM AND DEXTROSE 11.54 UNITS/KG/HR: 10000; 5 INJECTION INTRAVENOUS at 22:46

## 2021-01-07 RX ADMIN — IOPAMIDOL 135 ML: 755 INJECTION, SOLUTION INTRAVENOUS at 14:33

## 2021-01-07 RX ADMIN — LATANOPROST 1 DROP: 50 SOLUTION OPHTHALMIC at 21:37

## 2021-01-07 RX ADMIN — MUPIROCIN: 20 OINTMENT TOPICAL at 21:32

## 2021-01-07 RX ADMIN — ALPRAZOLAM 0.25 MG: 0.25 TABLET ORAL at 21:33

## 2021-01-07 RX ADMIN — ATORVASTATIN CALCIUM 40 MG: 40 TABLET, FILM COATED ORAL at 21:33

## 2021-01-07 ASSESSMENT — PAIN SCALES - GENERAL: PAINLEVEL_OUTOF10: 0

## 2021-01-07 NOTE — ANESTHESIA PRE PROCEDURE
Department of Anesthesiology  Preprocedure Note       Name:  Erika Conway   Age:  72 y.o.  :  1955                                          MRN:  [id number]         Date:  2021      Surgeon: Erin Baugh):  Hoang York MD    Procedure: Procedure(s):  CABG CORONARY ARTERY BYPASS, LIGATION LEFT ATRIAL APPENDAGE    Medications prior to admission:   Prior to Admission medications    Medication Sig Start Date End Date Taking?  Authorizing Provider   meloxicam (MOBIC) 15 MG tablet Take 1 tablet by mouth daily 21   Jeanine Sosa MD   latanoprost (XALATAN) 0.005 % ophthalmic solution  20   Historical Provider, MD       Current medications:    Current Facility-Administered Medications   Medication Dose Route Frequency Provider Last Rate Last Admin    sodium chloride flush 0.9 % injection 10 mL  10 mL Intravenous 2 times per day [facility] DO        sodium chloride flush 0.9 % injection 10 mL  10 mL Intravenous PRN [facility] DO        acetaminophen (TYLENOL) tablet 650 mg  650 mg Oral Q4H PRN [facility]        aspirin EC tablet 325 mg  325 mg Oral Daily [facility] DO        atorvastatin (LIPITOR) tablet 40 mg  40 mg Oral Nightly [facility] DO        metoprolol tartrate (LOPRESSOR) tablet 25 mg  25 mg Oral BID [facility] DO        latanoprost (XALATAN) 0.005 % ophthalmic solution 1 drop  1 drop Both Eyes Nightly [facility] DO        heparin (porcine) injection 5,200 Units  60 Units/kg Intravenous PRN [facility] DO        heparin (porcine) injection 2,600 Units  30 Units/kg Intravenous PRN [facility] DO        heparin 25,000 units in dextrose 5% 250 mL infusion  12 Units/kg/hr Intravenous Continuous [facility] DO        ALPRAZolam Jennifer Poncho) tablet 0.25 mg  0.25 mg Oral Once PRN Silke Ruth, APRN - CNP  [START ON 1/8/2021] pantoprazole (PROTONIX) injection 40 mg  40 mg Intravenous On Call [place], APRN - CNP        [START ON 1/8/2021] 0.9 % sodium chloride infusion   Intravenous Continuous [place], APRN - CNP        [START ON 1/8/2021] gabapentin (NEURONTIN) capsule 600 mg  600 mg Oral Once [place], APRN - CNP           Allergies:  No Known Allergies    Problem List:    Patient Active Problem List   Diagnosis Code    Chest pain R07.9    Hordeolum externum of right upper eyelid H00.011    Right-sided tinnitus H93.11    Bilateral chronic serous otitis media H65.23    Pulsatile tinnitus of right ear H93. A1    Bilateral high frequency sensorineural hearing loss H90.3    Diverticulosis of large intestine without hemorrhage K57.30    Hyperlipidemia E78.5    Dizziness R42    Abnormal coronary angiogram R93.1       Past Medical History:        Diagnosis Date    Hyperlipidemia 12/21/2020       Past Surgical History:        Procedure Laterality Date    COLONOSCOPY  05/04/2018       Social History:    Social History     Tobacco Use    Smoking status: Never Smoker    Smokeless tobacco: Never Used   Substance Use Topics    Alcohol use: No     Alcohol/week: 0.8 standard drinks     Types: 1 Standard drinks or equivalent per week                                Counseling given: Not Answered      Vital Signs (Current):   Vitals:    01/07/21 1226   BP: (!) 142/90   Pulse: 77   Resp: 20   Temp: 98 °F (36.7 °C)   SpO2: 96%   Weight: 191 lb (86.6 kg)   Height: 5' 7\" (1.702 m)                                              BP Readings from Last 3 Encounters:   01/07/21 (!) 142/90   01/06/21 122/68   01/05/21 (!) 148/67       NPO Status:                                                                                 BMI:   Wt Readings from Last 3 Encounters:   01/07/21 191 lb (86.6 kg)   01/06/21 191 lb (86.6 kg)   01/05/21 189 lb (85.7 kg)     Body mass index is 29.91 kg/m².     CBC: Lab Results   Component Value Date    WBC 5.8 01/07/2021    RBC 3.93 01/07/2021    HGB 11.9 01/07/2021    HCT 36.3 01/07/2021    MCV 92.5 01/07/2021    RDW 13.7 01/07/2021     01/07/2021       CMP:   Lab Results   Component Value Date     01/07/2021    K 3.4 01/07/2021     01/07/2021    CO2 24 01/07/2021    BUN 11 01/07/2021    CREATININE 0.7 01/07/2021    GFRAA >60 01/07/2021    GFRAA >60 11/23/2011    AGRATIO 1.3 01/07/2021    LABGLOM >60 01/07/2021    GLUCOSE 96 01/07/2021    PROT 6.1 01/07/2021    PROT 7.4 11/23/2011    CALCIUM 8.3 01/07/2021    BILITOT 0.5 01/07/2021    ALKPHOS 92 01/07/2021    AST 21 01/07/2021    ALT 30 01/07/2021       POC Tests: No results for input(s): POCGLU, POCNA, POCK, POCCL, POCBUN, POCHEMO, POCHCT in the last 72 hours. Coags:   Lab Results   Component Value Date    PROTIME 13.1 01/07/2021    INR 1.13 01/07/2021       HCG (If Applicable): No results found for: PREGTESTUR, PREGSERUM, HCG, HCGQUANT     ABGs:   Lab Results   Component Value Date    PHART 7.350 01/07/2021    PO2ART 80.4 01/07/2021    JCW2DGF 46.7 01/07/2021    BPP1NJO 25.7 01/07/2021    BEART -0.3 01/07/2021    T7XKFQFG 96.4 01/07/2021        Type & Screen (If Applicable):  No results found for: LABABO, LABRH    Drug/Infectious Status (If Applicable):  No results found for: HIV, HEPCAB    COVID-19 Screening (If Applicable): No results found for: COVID19      Anesthesia Evaluation  Patient summary reviewed and Nursing notes reviewed  Airway: Mallampati: II        Dental:          Pulmonary:                              Cardiovascular:  Exercise tolerance: poor (<4 METS),   (+) CAD:, hyperlipidemia                  Neuro/Psych:               GI/Hepatic/Renal:             Endo/Other:                     Abdominal:           Vascular:                                        Anesthesia Plan      general     ASA 4 - emergent       Induction: intravenous. arterial line, central line, CVP, PA catheter and GRETA    Anesthetic plan and risks discussed with patient. Use of blood products discussed with patient whom consented to blood products.                    Garcia Watt MD   1/7/2021

## 2021-01-07 NOTE — OP NOTE
Cardiac Catheterization     Procedure: LHC, LVG  Complications: None  Medications: Procedural sedation with minimal conscious sedation (2.5 Versed/100 Fentanyl)  An independent trained observer pushed medications at my direction. We monitored the patient's level of consciousness and vital signs/physiologic status throughout the procedure duration (see start and stop times in log). Estimated Blood Loss: Less than 20 mL  Specimens: were not obtained  Access:  6 Fr Right Radial  Closure: TR band   Contrast:  135 cc  Start time: 7459  Stop time: 1418  Fluoro time: 8.1  Findings:     Left Heart Cath  Dominance: Co     LM: short, MLI  LAD: 80% short, ostial stenosis; three moderate sized diagonals with 90% ostial to proximal  D2 and 70% ostial D3; 40% mid LAD disease  LCx: large first marginal free of significant disease; 85% eccentric mid stenosis followed by 40% proximal OM2   RCA: moderate sized vessel with 95% distal stenosis just prior to two posterolateral branches and RPDA; 90% mid acute marginal     LVEDP: 8 mmHg   LVEF: 65%    Impression/Recommendations:  Discussed with CTS. Admit for CABG evaluation. Georgiana Ayoub DO, [facility]  Interventional Cardiology     o: [phone]  [address] [place], [place] [address] [address], [address] [facility]      NOTE:  This report was transcribed using voice recognition software. Every effort was made to ensure accuracy; however, inadvertent computerized transcription errors may be present.

## 2021-01-07 NOTE — DISCHARGE SUMMARY
Discharge Summary    Patient:  Ernesto Jonas 1955 [id number]   Admission Date:  1/7/2021 11:34 AM  Discharge Date:  1/15/2021    Principle Diagnosis:  Severe multivessel coronary artery diease    Secondary Diagnosis:    Hyperlipidemia     Cardiac Cath: 1/7/2021  Dominance: Co      LM: short, MLI  LAD: 80% short, ostial stenosis; three moderate sized diagonals with 90% ostial to proximal  D2 and 70% ostial D3; 40% mid LAD disease  LCx: large first marginal free of significant disease; 85% eccentric mid stenosis followed by 40% proximal OM2   RCA: moderate sized vessel with 95% distal stenosis just prior to two posterolateral branches and RPDA; 90% mid acute marginal      LVEDP: 8 mmHg   LVEF: 65%    Procedure:  1/8/2021  URGENT CABG X5, CORONARY ARTERY BYPASS COOK TO LAD, VEIN GRAFT TO RPDA, SEQUENTAIL VEIN GRAFT TO D3, D2 AND PL BRANCH CIRCUMFLEX, LIGATION LEFT ATRIAL APPENDAGE 40MM ATRICLIP, EVH LEFT LEG, ENDOSCOPICALLY VEIN HARVEST OF LEFT SAPHENOUS VEIN, GRETA AORTIC ULTRASOUND, DOPPLER VERIFICATION OF FLOW TO CONFIRM BYPASS GRAFTS, INSERTION OF VENTRICULAR PACING WIRES, BILATERAL INCOSTAL NEVRE BLOCK 5TH LEVEL WITH EXPAREL AND MARCAINE. History:  The patient is a 72 y.o. male with significant past medical history of hyperlipidemia who presented to Dr. Antonio Brown on 1/7 for an outpatient angiogram.   He initially presented to his PCP on 12/14/2020 with intermittent chest pain, progressive shortness of breath for at least six months,  testicular pain, and dizziness. He was referred to see Dr. Antonio Brown and Dr. Gera Mcallister. The patient saw Dr. Gera Mcallister on 1/6/2021 and there was no evidence of inguinal hernia. Today his angiogram found severe multivessel coronary artery disease. CVTS was consulted to evaluate for myocardial revascularization. Hospital Course: The patient underwent CABG X 5, TORREY on 1/8/2021 .   The patient did have some atrial fibrillation and atrial flutter post-op that was treated with PO (ROXICODONE) 5 MG immediate release tablet  Take 1 tablet by mouth every 4 hours as needed for Pain for up to 7 days. pantoprazole (PROTONIX) 40 MG tablet  Take 1 tablet by mouth daily             sennosides-docusate sodium (SENOKOT-S) 8.6-50 MG tablet  Take 1 tablet by mouth 2 times daily                 Labs:  Renal:   Lab Results   Component Value Date     01/13/2021    K 4.0 01/13/2021     01/13/2021    CO2 26 01/13/2021    BUN 27 01/13/2021    CREATININE 1.1 01/13/2021     Heme:   Lab Results   Component Value Date    WBC 15.2 01/15/2021    HGB 8.6 01/15/2021    HCT 26.5 01/15/2021    MCV 91.9 01/15/2021     01/15/2021     HgA1C:   Lab Results   Component Value Date    LABA1C 6.2 01/07/2021       Admission WT: 86.6 kg  Pre-Op WT: 85.4 kg  Discharge WT: 86.8 kg      Patient Instructions: Activity: DO NOT LIFT, PUSH, OR PULL ANYTHING OVER 5 POUNDS FOR 8 WEEK from the day of surgery  Diet:  cardiac diet  Wound Care:  [facility] YOUR INCISIONS DAILY WITH A CLEAN WASHCLOTH AND ANTIBACTERIAL SOAP. Do not wash your incisions after you have cleansed other parts of your body    Patient was instructed to adhere strictly to social distancing measures and avoid  close contact with individuals who are at risk for being asymptomatic carries and/or have had known contact with a diagnosed COVID-19 patient over the next few weeks. Follow up with Cardiothoracic Surgeon, Dr. Jair Hooper on 1/27/2021 at 3pm  Follow up with Cardiologist, Dr. Emilia Diaz on 2/9/2021 at [address] [place] [address]   Dr. Tariq Merrill office will be scheduling appointment to see patient in about 2 weeks.       Electronically signed by ALEXANDER Blevins CNP on 1/15/2021 at 3:02 PM

## 2021-01-07 NOTE — H&P
activity: Not on file   Lifestyle    Physical activity     Days per week: Not on file     Minutes per session: Not on file    Stress: Not on file   Relationships    Social connections     Talks on phone: Not on file     Gets together: Not on file     Attends Mosque service: Not on file     Active member of club or organization: Not on file     Attends meetings of clubs or organizations: Not on file     Relationship status: Not on file    Intimate partner violence     Fear of current or ex partner: Not on file     Emotionally abused: Not on file     Physically abused: Not on file     Forced sexual activity: Not on file   Other Topics Concern    Not on file   Social History Narrative    Not on file        Family History:  No evidence for sudden cardiac death or premature CAD. Problem Relation Age of Onset    Heart Disease Paternal Uncle     High Blood Pressure Mother     Diabetes Mother     High Blood Pressure Father        Medications:  Prior to Admission medications    Medication Sig Start Date End Date Taking? Authorizing Provider   meloxicam (MOBIC) 15 MG tablet Take 1 tablet by mouth daily 1/4/21   Sarah Leung MD   latanoprost (XALATAN) 0.005 % ophthalmic solution  1/6/20   Historical Provider, MD       Allergies:  Patient has no known allergies. Review of Systems:   [x]Full ROS obtained and negative except as mentioned in HPI    Physical Examination:    There were no vitals taken for this visit.   Wt Readings from Last 3 Encounters:   01/06/21 191 lb (86.6 kg)   01/05/21 189 lb (85.7 kg)   01/04/21 191 lb (86.6 kg)       GENERAL: Well developed, well nourished, no acute distress  NEUROLOGICAL: Alert and oriented x3  PSYCH: Normal mood and affect   SKIN: Warm and dry, without lesions  HEENT: Normocephalic, atraumatic, Sclera non-icteric, mucous membranes moist  NECK: supple, JVP normal, thyroid not enlarged   CAROTID: Normal upstroke, no bruits  CARDIAC: Normal PMI, regular rate and rhythm, normal S1S2, no murmur, rub  RESPIRATORY: Normal respiratory effort, clear to auscultation bilaterally  EXTREMITIES: No cyanosis, clubbing or edema, palpable pulses bilaterally   MUSCULOSKELETAL: No joint swelling or tenderness, no chest wall tenderness  GASTROINTESTINAL:  soft, non-tender, no bruit    Labs:  Lab Review   Lab Results   Component Value Date     12/14/2020    K 4.2 12/14/2020     12/14/2020    CO2 27 12/14/2020    BUN 8 12/14/2020    CREATININE 1.0 12/14/2020    GLUCOSE 106 12/14/2020    CALCIUM 9.3 12/14/2020     Lab Results   Component Value Date    WBC 5.8 01/15/2019    HGB 13.6 01/15/2019    HCT 41.1 01/15/2019    MCV 92.7 01/15/2019     01/15/2019     Lab Results   Component Value Date    CHOL 176 12/14/2020    TRIG 87 12/14/2020    HDL 50 12/14/2020    HDL 49 11/23/2011     Imaging:  I have reviewed the below testing personally:       EKG 12/22/20  SR  WNL     2011  SR, Low voltage     SPECT 1/28/11  FINDINGS-     The stress and rest tomographic views show normal radionuclide distribution throughout the myocardium.    There is no evidence of ischemia or infarction.     The wall motion is normal and the left ventricular ejection fraction is calculated at 70%.     The patient exercised for 7 minutes and 30 seconds achieving an age-predicted heart rate of 90%.             GXT 2/17/20   Rest   ECG   Normal sinus rhythm.      Stress   Stress Type: Exercise      Stress Protocol: Antolin      Rest HR: 79 bpm                             HR BP Product: 14302   Rest BP: 127/88 mmHg                        Max Exercise: 7 METS   Stress Peak HR: 137 bpm   Stress Peak BP: 156/71 mmHg   Predicted HR: 156 bpm   % of predicted HR: 88   Test Duration: 6 min   Reason for Termination: Physiologic Maximum      Results      ECG   Normal sinus rhythm.   Normal electrocardiographic response to exercise with less than 1.0 mm of up   sloping ST depression.      Symptoms   There was stress induced lightheadedness.   Patient had \"muscular\" chest discomfort 7/10 before stress with no change   noted.   Symptoms resolved with rest.      1/5/21 CCTA  Moderate if not severe stenosis involving the proximal LAD with predominantly   noncalcified or soft and potentially vulnerable plaque.       There is calcified plaque with mild-to-moderate narrowing proximally just   beyond the LAD separate origin from the left coronary cusp.       Separate circumflex origin from the left coronary cusp adjacent to the LAD. Mild-to-moderate stenosis of the circumflex near the marginal bifurcation.       Mild-to-moderate narrowing right coronary artery at the origin with   multifocal plaques.  Right coronary artery is less well demonstrated due to   phase misregistration in motion.       Codominant right and left, distal right coronary and LAD.       RECOMMENDATIONS:   Interventional cardiology evaluation.           Impression/Recommendations    Mr. Rachael Blood is a 72 y.o. male patient    Chest pain  CAD, concern for occlusive disease by CCTA  Hyperlipidemia, stable (12/2020:  TG 87 HDL 50 )  Borderline diabetes mellitus      The 10-year ASCVD risk score (Latha Foster, et al., 2013) is: 8.5%    Values used to calculate the score:      Age: 72 years      Sex: Male      Is Non- : Yes      Diabetic: No      Tobacco smoker: No      Systolic Blood Pressure: 341 mmHg      Is BP treated: No      HDL Cholesterol: 50 mg/dL      Total Cholesterol: 176 mg/dL     Discussed normal stress testing from 2011 and 2020. He is with recurrent chest and left arm symptoms and was agreeable to coronary CTA for further evaluation. Discussed results and recommendation for coronary angiography.     Risks, benefits, goals, and alternatives of left heart catheterization with the potential for percutaneous coronary intervention discussed with patient; including stroke, heart attack, kidney damage, death, paralysis, disability, damage to nerves/arteries/veins. All questions answered and informed consent obtained.       Jessica Keith D.O., [facility]  Interventional Cardiology     o: [phone]  [address] [place] [address]., [address], [address] [facility]

## 2021-01-07 NOTE — CONSULTS
Cardiothoracic Surgery Consultation           PCP: Martha Trinidad MD    Referring Physician: Zulay Celaya MD     DIAGNOSIS:  Severe multivessel coronary artery disease    CHIEF COMPLAINT:  Shortness of breath    History Obtained From:  patient, electronic medical record    HISTORY OF PRESENT ILLNESS:      The patient is a 72 y.o. AA male with significant past medical history of hyperlipidemia who presented to Dr. Tera Champion on 1/7 for an outpatient angiogram.   He initially presented to his PCP on 12/14/2020 with intermittent chest pain, progressive shortness of breath for at least six months, testicular pain, and dizziness. He was referred to see Dr. Tera Champion and Dr. Karishma Renteria. The patient saw Dr. Karishma Renteria on 1/6/2021 and there was no evidence of inguinal hernia. Today his angiogram found severe multivessel coronary artery disease. CVTS was consulted to evaluate for myocardial revascularization. The patient is a non-smoker and is currently denying any complaints of chest pain or shortness of breath. He is not on any anticoagulation at home. Heparin gtt is ordered. Past Medical History:        Diagnosis Date    Hyperlipidemia 12/21/2020       Past Surgical History:        Procedure Laterality Date    COLONOSCOPY  05/04/2018       Medications:   Home Meds:   Prior to Admission medications    Medication Sig Start Date End Date Taking?  Authorizing Provider   meloxicam (MOBIC) 15 MG tablet Take 1 tablet by mouth daily 1/4/21   Blanca Castro MD   latanoprost (XALATAN) 0.005 % ophthalmic solution  1/6/20   Historical Provider, MD      Scheduled Meds:    sodium chloride flush  10 mL Intravenous 2 times per day    aspirin  325 mg Oral Daily    atorvastatin  40 mg Oral Nightly    metoprolol tartrate  25 mg Oral BID    latanoprost  1 drop Both Eyes Nightly    [START ON 1/8/2021] pantoprazole  40 mg Intravenous On Call    [START ON 1/8/2021] gabapentin  600 mg Oral Once Continuous Infusions:    heparin (PORCINE) Infusion      [START ON 1/8/2021] sodium chloride         Current Facility-Administered Medications   Medication Dose Route Frequency Provider Last Rate Last Admin    sodium chloride flush 0.9 % injection 10 mL  10 mL Intravenous 2 times per day [facility]), DO        sodium chloride flush 0.9 % injection 10 mL  10 mL Intravenous PRN [facility]), DO        acetaminophen (TYLENOL) tablet 650 mg  650 mg Oral Q4H PRN [facility]), DO        aspirin EC tablet 325 mg  325 mg Oral Daily [facility]), DO        atorvastatin (LIPITOR) tablet 40 mg  40 mg Oral Nightly [facility]), DO        metoprolol tartrate (LOPRESSOR) tablet 25 mg  25 mg Oral BID [facility]), DO        latanoprost (XALATAN) 0.005 % ophthalmic solution 1 drop  1 drop Both Eyes Nightly [facility]), DO        heparin (porcine) injection 4,000 Units  4,000 Units Intravenous PRN [facility]), DO        heparin (porcine) injection 2,000 Units  2,000 Units Intravenous PRN [facility]), DO        heparin 25,000 units in dextrose 5% 250 mL infusion  11.54 Units/kg/hr Intravenous Continuous [facility]), DO        ALPRAZolam Yazmin Bears) tablet 0.25 mg  0.25 mg Oral Once PRN ALEXANDER Lebron CNP        [START ON 1/8/2021] pantoprazole (PROTONIX) injection 40 mg  40 mg Intravenous On Call ALEXANDER Lebron - CNP        [START ON 1/8/2021] 0.9 % sodium chloride infusion   Intravenous Continuous Aniket ALEXANDER Xie - CNP        [START ON 1/8/2021] gabapentin (NEURONTIN) capsule 600 mg  600 mg Oral Once Deryl ALEXANDER Xie - CNP           Allergies:  Patient has no known allergies. Social History:    TOBACCO:   reports that he has never smoked. He has never used smokeless tobacco.  ETOH:   reports no history of alcohol use. DRUGS:   reports no history of drug use.   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     Family History:        Problem Relation Age of Onset    Heart Disease Paternal Uncle     High Blood Pressure Mother     Diabetes Mother     High Blood Pressure Father        REVIEW OF SYSTEMS:    CONSTITUTIONAL:  fatigue  DERMATOLOGICAL: negative  NEUROLOGICAL:  negative  EYES:  positive for  glasses  HEENT:  negative  RESPIRATORY:  positive for  dyspnea  CARDIOVASCULAR:  Positive for chest pain   GASTROINTESTINAL:  Positive for right groin pain   GENITO-URINARY: positive for - nocturia  ENDOCRINE: negative  MUSCULOSKELETAL:  negative  HEMATOLOGICAL AND LYMPHATIC: negative  IMMUNOLOGICAL: negative  PSYCHOLOGICAL: negative    PHYSICAL EXAM:    VITALS:  BP (!) 142/90   Pulse 86   Temp 98 °F (36.7 °C)   Resp 17   Ht 5' 7\" (1.702 m)   Wt 191 lb (86.6 kg)   SpO2 96%   BMI 29.91 kg/m²     Hand Dominance: right     Eyes:  lids and lashes normal, pupils equal and round, extra ocular muscles intact, sclera clear, conjunctiva normal    Head/ENT:  Normocephalic, atraumatic, good residual dentition, normal gums, & palate, oropharynx without erythema or exudates    Neck:  supple, symmetrical, trachea midline, no lymphadenopathy, no jugular venous distension, no carotid bruits and MASSES:  no masses    Lungs:  no increased work of breathing, good air exchange, no retractions and clear to auscultation, no palpable / percussible abnormalities    Cardiovascular:  regular rate and rhythm, S1, S2 normal, no murmur, click, rub or gallop    Pulses:     carotid brachial radial femoral popliteal DP PT   RIGHT 2+ 2+ TR band 2+ 2+ 2+ 2+   LEFT 2+ 2+ 2+ 2+ 2+ 2+ 2+       Abdomen:  Soft, normal bowel sounds, non-tender, no hepatosplenomegaly, aorta likely normal and bruits absent    Musculoskeletal:  Back is straight and non-tender,  No CVAT, full ROM of upper and lower extremities.     Extremities:   No clubbing, or cyanosis, or edema     Skin: warm and normal turgor, no ulcers, infections, or rashes, no rashes, no ecchymoses, no petechiae, no nodules, no jaundice    Neurological: awake, alert and oriented x 3, motor 5/5 bilateral upper and lower extremities, sensation grossly intact    Psychiatric: Mood and affect appear appropriate    LABS:   BMP:   Lab Results   Component Value Date     2021    K 3.4 2021     2021    CO2 24 2021    BUN 11 2021    CREATININE 0.7 2021      No components found for: GLUNo results found for: MG   CBC:   Lab Results   Component Value Date    WBC 5.8 2021    HGB 11.9 2021    HCT 36.3 2021    MCV 92.5 2021     2021      Coagulation:   Lab Results   Component Value Date    INR 1.13 2021     HgbA1c:  Lab Results   Component Value Date    LABA1C 6.1 2020      Hepatic Profile:  No results found for: ALB    Lab Results   Component Value Date    BILITOT 0.5 2021    BILIDIR 0.1 2014    AST 21 2021    ALT 30 2021    ALKPHOS 92 2021      Cholesterol:  Lab Results   Component Value Date    CHOL 176 2020    HDL 50 2020    HDL 49 2011    LDLCALC 109 2020    TRIG 87 2020      TESTING/IMAGING  EK2021  NSR    ANGIOGRAM: 2021  Dominance: Co      LM: short, MLI  LAD: 80% short, ostial stenosis; three moderate sized diagonals with 90% ostial to proximal  D2 and 70% ostial D3; 40% mid LAD disease  LCx: large first marginal free of significant disease; 85% eccentric mid stenosis followed by 40% proximal OM2   RCA: moderate sized vessel with 95% distal stenosis just prior to two posterolateral branches and RPDA; 90% mid acute marginal      LVEDP: 8 mmHg   LVEF: 65%    ECHO: 2021  -Normal left ventricle size, wall thickness and systolic function with an   estimated ejection fraction of 55%. -[place] is not well visualized.    -E/e\"= 11.6 .   -Normal diastolic function.   - Mild mitral regurgitation.   -Mild tricuspid regurgitation.   -Pasp 31mmHg. CTA CARDIAC W CONTRAST: 1/5/2021    Moderate if not severe stenosis involving the proximal LAD with predominantly noncalcified or soft and potentially vulnerable plaque. There is calcified plaque with mild-to-moderate narrowing proximally just  beyond the LAD separate origin from the left coronary cusp. Separate circumflex origin from the left coronary cusp adjacent to the LAD. Mild-to-moderate stenosis of the circumflex near the marginal bifurcation. Mild-to-moderate narrowing right coronary artery at the origin with multifocal plaques. Right coronary artery is less well demonstrated due to phase misregistration in motion. Codominant right and left, distal right coronary and LAD.     CXRAY:  Pending     CAROTIDS: pending    PFTS: pending     [id number] Score for Atrial Fibrillation Stroke Risk   Risk   Factors  Component Value   C CHF No 0   H HTN No 0   A2 Age >= 75 No,  (66 y.o.) 0   D DM No 0   S2 Prior Stroke/TIA No 0   V Vascular Disease No 0   A Age 74-69 Yes,  (66 y.o.) 1   Sc Sex male 0    [id number]  Score  1   Score last updated 1/7/21 1:74 PM EST    Click here for a link to the UpToDate guideline \"Atrial Fibrillation: Anticoagulation therapy to prevent embolization    Disclaimer: Risk Score calculation is dependent on accuracy of patient problem list and past encounter diagnosis. CTS Adult Cardiac Risk: Pending carotids and PFTs  Mortality Risk: 0.480%  Renal Failure: 0.664%  Permanent Stroke: 0.683%  Prolonged Ventilation: 2.857%  DSW Infection: 0.193%  Reoperation: 1.403%  Morbidity and Mortality: 4.827%  Short Length of Stay: 59.758%  Long Length of Stay: 2.157%      ASSESSMENT AND PLAN:  71 y/o AA male with [place] and severe 3V CAD. I discussed with him and his wife who was on speaker phoneTreatment options including doing nothing and the consequences of that vs medical therapy vs multiple PCI vs surgical revascularization with CABG.  Best Option is CABG and patient is amenable to this. Will complete standard pre op work up and risk stratification and tentatively schedule his surgery for tomorrow at 7:00am  He understands that with urgent CABG surgery he has risks including but not limited to bleeding that might require transfusions and/or reexploration, infection, irregular heartbeat, heart block that might require a permanent pacemaker, wound dehiscence, sternal dehiscence with possible need for reconstructive surgery and multiple surgical scars, chronic chest wall pain syndrome and/or chest wall numbness/tingling, brachial plexus and/or peripheral neuropathy, stroke with possible permanent deficit, respiratory failure with possible need for prolonged mechanical ventilation and/or tracheostomy, acute kidney injury with possible need for dialysis, intestinal organ malperfusion and possible limb ischemia and limb loss, postoperative low cardiac output syndrome that might require mechanical circulatory support, heart attack, bypass graft failure that might require another percutaneous intervention and/or repeat bypass surgery, and even death with an operation. Pertinent questions were asked and answered. The patient was counseled at length about the risks of cleo Covid-19 during their perioperative period and any recovery window from their procedure. The patient was made aware that cleo Covid-19  may worsen their prognosis for recovering from their procedure  and lend to a higher morbidity and/or mortality risk. All material risks, benefits, and reasonable alternatives including postponing the procedure were discussed. The patient does wish to proceed with the procedure at this time. Thank you Dr. Malcom Stevens for the consultation.        Electronically signed by ALEXANDER Mullins CNP on 1/7/2021 at 4:24 PM

## 2021-01-07 NOTE — PRE SEDATION
Brief Pre-Op Note/Sedation Assessment      Warden Gamboa  1955  Room/bed info not found      [id number]  8:01 AM    Planned Procedure: Cardiac Catheterization Procedure    Post Procedure Plan: Return to same level of care    Consent: obtained    Chief Complaint: Anginal Equivalent      Indications for Cath Procedure: Worsening Angina  Anginal Classification within 2 weeks:  CCS III - Symptoms with everyday living activities, i.e., moderate limitation  NYHA Heart Failure Class within 2 weeks: No symptoms  Is Cath Lab Visit Valve-related?: No  Surgical Risk: Intermediate  Functional Type: >= 4 METS with symptoms    Anti- Anginal Meds within 2 weeks:   No    Stress or Imaging Studies Performed:  Cardiac CTA: Yes: Obstructive CAD      Vital Signs: There were no vitals taken for this visit. Allergies:  No Known Allergies    Past Medical History:  Past Medical History:   Diagnosis Date    Hyperlipidemia 12/21/2020         Surgical History:  Past Surgical History:   Procedure Laterality Date    COLONOSCOPY  05/04/2018         Medications:  Current Outpatient Medications   Medication Sig Dispense Refill    meloxicam (MOBIC) 15 MG tablet Take 1 tablet by mouth daily 30 tablet 1    latanoprost (XALATAN) 0.005 % ophthalmic solution        No current facility-administered medications for this encounter. Pre-Sedation:    Pre-Sedation Documentation and Exam:  I have personally completed a history, physical exam & review of systems for this patient (see notes). Prior History of Anesthesia Complications:   none    Modified Mallampati:  II (soft palate, uvula, fauces visible)    ASA Classification:  Class 3 - A patient with severe systemic disease that limits activity but is not incapacitating      Girish Scale:   Activity:  2 - Able to move 4 extremities voluntarily on command  Respiration:  2 - Able to breathe deeply and cough freely  Circulation:  2 - BP+/- 20mmHg of normal  Consciousness:  2 - Fully awake  Oxygen Saturation (color):  2 - Able to maintain oxygen saturation >92% on room air    Sedation/Anesthesia Plan:  Guard the patient's safety and welfare. Minimize physical discomfort and pain. Minimize negative psychological responses to treatment by providing sedation and analgesia and maximize the potential amnesia. Patient to meet pre-procedure discharge plan. Medication Planned:  midazolam intravenously and fentanyl intravenously    Patient is an appropriate candidate for plan of sedation: yes    The risks, benefits, goals, and alternatives of the procedure were discussed in detail with the patient. Informed consent was obtained and further recommendations will be made following the procedure. Manisha Shelton DO, [facility]  Interventional Cardiology     o: [phone]  [address] [place], [place] [address] [address], [address]      NOTE:  This report was transcribed using voice recognition software. Every effort was made to ensure accuracy; however, inadvertent computerized transcription errors may be present.

## 2021-01-07 NOTE — PROGRESS NOTES
Pt arrived on floor via stretcher. Dr Martin Hodge and Arsen Yanes CNP both at bedside. Placed on monitor. Will follow up with vitals when they are finished.

## 2021-01-08 ENCOUNTER — ANESTHESIA (OUTPATIENT)
Dept: OPERATING ROOM | Age: 66
DRG: 166 | End: 2021-01-08
Payer: MEDICAID

## 2021-01-08 ENCOUNTER — APPOINTMENT (OUTPATIENT)
Dept: GENERAL RADIOLOGY | Age: 66
DRG: 166 | End: 2021-01-08
Attending: INTERNAL MEDICINE
Payer: MEDICAID

## 2021-01-08 VITALS
DIASTOLIC BLOOD PRESSURE: 68 MMHG | TEMPERATURE: 99.1 F | OXYGEN SATURATION: 98 % | RESPIRATION RATE: 10 BRPM | SYSTOLIC BLOOD PRESSURE: 116 MMHG

## 2021-01-08 LAB
ACTIVATED CLOTTING TIME: 116 SEC (ref 99–130)
ACTIVATED CLOTTING TIME: 117 SEC (ref 99–130)
ACTIVATED CLOTTING TIME: 414 SEC (ref 99–130)
ACTIVATED CLOTTING TIME: 439 SEC (ref 99–130)
ACTIVATED CLOTTING TIME: 450 SEC (ref 99–130)
ACTIVATED CLOTTING TIME: 519 SEC (ref 99–130)
ACTIVATED CLOTTING TIME: 539 SEC (ref 99–130)
ACTIVATED CLOTTING TIME: 540 SEC (ref 99–130)
ANION GAP SERPL CALCULATED.3IONS-SCNC: 5 MMOL/L (ref 3–16)
ANION GAP SERPL CALCULATED.3IONS-SCNC: 8 MMOL/L (ref 3–16)
APTT: 27.4 SEC (ref 24.2–36.2)
APTT: 74.7 SEC (ref 24.2–36.2)
BASE EXCESS ARTERIAL: -1 (ref -3–3)
BASE EXCESS ARTERIAL: -1 (ref -3–3)
BASE EXCESS ARTERIAL: -2 (ref -3–3)
BASE EXCESS ARTERIAL: -3 (ref -3–3)
BASE EXCESS ARTERIAL: -4 (ref -3–3)
BASE EXCESS ARTERIAL: -4 (ref -3–3)
BASE EXCESS ARTERIAL: 0 (ref -3–3)
BUN BLDV-MCNC: 10 MG/DL (ref 7–20)
BUN BLDV-MCNC: 14 MG/DL (ref 7–20)
CALCIUM IONIZED: 1.03 MMOL/L (ref 1.12–1.32)
CALCIUM IONIZED: 1.05 MMOL/L (ref 1.12–1.32)
CALCIUM IONIZED: 1.09 MMOL/L (ref 1.12–1.32)
CALCIUM IONIZED: 1.1 MMOL/L (ref 1.12–1.32)
CALCIUM IONIZED: 1.11 MMOL/L (ref 1.12–1.32)
CALCIUM IONIZED: 1.19 MMOL/L (ref 1.12–1.32)
CALCIUM IONIZED: 1.22 MMOL/L (ref 1.12–1.32)
CALCIUM SERPL-MCNC: 8 MG/DL (ref 8.3–10.6)
CALCIUM SERPL-MCNC: 9.2 MG/DL (ref 8.3–10.6)
CHLORIDE BLD-SCNC: 105 MMOL/L (ref 99–110)
CHLORIDE BLD-SCNC: 112 MMOL/L (ref 99–110)
CO2: 22 MMOL/L (ref 21–32)
CO2: 26 MMOL/L (ref 21–32)
CREAT SERPL-MCNC: 0.9 MG/DL (ref 0.8–1.3)
CREAT SERPL-MCNC: 0.9 MG/DL (ref 0.8–1.3)
ESTIMATED AVERAGE GLUCOSE: 131.2 MG/DL
FIBRINOGEN: 194 MG/DL (ref 200–397)
GFR AFRICAN AMERICAN: >60
GFR AFRICAN AMERICAN: >60
GFR NON-AFRICAN AMERICAN: >60
GFR NON-AFRICAN AMERICAN: >60
GLUCOSE BLD-MCNC: 101 MG/DL (ref 70–99)
GLUCOSE BLD-MCNC: 102 MG/DL (ref 70–99)
GLUCOSE BLD-MCNC: 104 MG/DL (ref 70–99)
GLUCOSE BLD-MCNC: 105 MG/DL (ref 70–99)
GLUCOSE BLD-MCNC: 106 MG/DL (ref 70–99)
GLUCOSE BLD-MCNC: 106 MG/DL (ref 70–99)
GLUCOSE BLD-MCNC: 108 MG/DL (ref 70–99)
GLUCOSE BLD-MCNC: 110 MG/DL (ref 70–99)
GLUCOSE BLD-MCNC: 112 MG/DL (ref 70–99)
GLUCOSE BLD-MCNC: 116 MG/DL (ref 70–99)
GLUCOSE BLD-MCNC: 124 MG/DL (ref 70–99)
GLUCOSE BLD-MCNC: 126 MG/DL (ref 70–99)
GLUCOSE BLD-MCNC: 129 MG/DL (ref 70–99)
GLUCOSE BLD-MCNC: 135 MG/DL (ref 70–99)
GLUCOSE BLD-MCNC: 138 MG/DL (ref 70–99)
GLUCOSE BLD-MCNC: 149 MG/DL (ref 70–99)
GLUCOSE BLD-MCNC: 192 MG/DL (ref 70–99)
GLUCOSE BLD-MCNC: 98 MG/DL (ref 70–99)
HBA1C MFR BLD: 6.2 %
HCO3 ARTERIAL: 21.4 MMOL/L (ref 21–29)
HCO3 ARTERIAL: 21.8 MMOL/L (ref 21–29)
HCO3 ARTERIAL: 21.9 MMOL/L (ref 21–29)
HCO3 ARTERIAL: 22.1 MMOL/L (ref 21–29)
HCO3 ARTERIAL: 22.5 MMOL/L (ref 21–29)
HCO3 ARTERIAL: 22.9 MMOL/L (ref 21–29)
HCO3 ARTERIAL: 23.1 MMOL/L (ref 21–29)
HCO3 ARTERIAL: 23.2 MMOL/L (ref 21–29)
HCO3 ARTERIAL: 23.9 MMOL/L (ref 21–29)
HCO3 ARTERIAL: 24.9 MMOL/L (ref 21–29)
HCT VFR BLD CALC: 33.5 % (ref 40.5–52.5)
HCT VFR BLD CALC: 38.2 % (ref 40.5–52.5)
HEMOGLOBIN: 10.3 GM/DL (ref 13.5–17.5)
HEMOGLOBIN: 10.3 GM/DL (ref 13.5–17.5)
HEMOGLOBIN: 10.7 GM/DL (ref 13.5–17.5)
HEMOGLOBIN: 10.9 G/DL (ref 13.5–17.5)
HEMOGLOBIN: 12.5 G/DL (ref 13.5–17.5)
HEMOGLOBIN: 12.6 GM/DL (ref 13.5–17.5)
HEMOGLOBIN: 7.7 GM/DL (ref 13.5–17.5)
HEMOGLOBIN: 7.7 GM/DL (ref 13.5–17.5)
HEMOGLOBIN: 7.8 GM/DL (ref 13.5–17.5)
HEMOGLOBIN: 7.9 GM/DL (ref 13.5–17.5)
HEMOGLOBIN: 8 GM/DL (ref 13.5–17.5)
HEMOGLOBIN: 8 GM/DL (ref 13.5–17.5)
INR BLD: 1.13 (ref 0.86–1.14)
LACTATE: 1.02 MMOL/L (ref 0.4–2)
LACTATE: 1.17 MMOL/L (ref 0.4–2)
LACTATE: 1.42 MMOL/L (ref 0.4–2)
LACTATE: 1.51 MMOL/L (ref 0.4–2)
LACTATE: 1.55 MMOL/L (ref 0.4–2)
LACTATE: 1.64 MMOL/L (ref 0.4–2)
LACTATE: 1.79 MMOL/L (ref 0.4–2)
LACTATE: 1.91 MMOL/L (ref 0.4–2)
LACTATE: 2.25 MMOL/L (ref 0.4–2)
MAGNESIUM: 3 MG/DL (ref 1.8–2.4)
MCH RBC QN AUTO: 30.1 PG (ref 26–34)
MCH RBC QN AUTO: 30.1 PG (ref 26–34)
MCHC RBC AUTO-ENTMCNC: 32.5 G/DL (ref 31–36)
MCHC RBC AUTO-ENTMCNC: 32.8 G/DL (ref 31–36)
MCV RBC AUTO: 91.7 FL (ref 80–100)
MCV RBC AUTO: 92.5 FL (ref 80–100)
O2 SAT, ARTERIAL: 100 % (ref 93–100)
O2 SAT, ARTERIAL: 98 % (ref 93–100)
O2 SAT, ARTERIAL: 99 % (ref 93–100)
PCO2 ARTERIAL: 29.8 MM HG (ref 35–45)
PCO2 ARTERIAL: 30.9 MM HG (ref 35–45)
PCO2 ARTERIAL: 33.2 MM HG (ref 35–45)
PCO2 ARTERIAL: 38.2 MM HG (ref 35–45)
PCO2 ARTERIAL: 38.4 MM HG (ref 35–45)
PCO2 ARTERIAL: 39.1 MM HG (ref 35–45)
PCO2 ARTERIAL: 40.7 MM HG (ref 35–45)
PCO2 ARTERIAL: 41.2 MM HG (ref 35–45)
PCO2 ARTERIAL: 41.5 MM HG (ref 35–45)
PCO2 ARTERIAL: 42.7 MM HG (ref 35–45)
PDW BLD-RTO: 13.7 % (ref 12.4–15.4)
PDW BLD-RTO: 14.1 % (ref 12.4–15.4)
PERFORMED ON: ABNORMAL
PH ARTERIAL: 7.34 (ref 7.35–7.45)
PH ARTERIAL: 7.36 (ref 7.35–7.45)
PH ARTERIAL: 7.36 (ref 7.35–7.45)
PH ARTERIAL: 7.37 (ref 7.35–7.45)
PH ARTERIAL: 7.37 (ref 7.35–7.45)
PH ARTERIAL: 7.38 (ref 7.35–7.45)
PH ARTERIAL: 7.39 (ref 7.35–7.45)
PH ARTERIAL: 7.42 (ref 7.35–7.45)
PH ARTERIAL: 7.47 (ref 7.35–7.45)
PH ARTERIAL: 7.47 (ref 7.35–7.45)
PLATELET # BLD: 177 K/UL (ref 135–450)
PLATELET # BLD: 248 K/UL (ref 135–450)
PMV BLD AUTO: 7.5 FL (ref 5–10.5)
PMV BLD AUTO: 7.6 FL (ref 5–10.5)
PO2 ARTERIAL: 104.9 MM HG (ref 75–108)
PO2 ARTERIAL: 159.1 MM HG (ref 75–108)
PO2 ARTERIAL: 171 MM HG (ref 75–108)
PO2 ARTERIAL: 243.9 MM HG (ref 75–108)
PO2 ARTERIAL: 254.8 MM HG (ref 75–108)
PO2 ARTERIAL: 263 MM HG (ref 75–108)
PO2 ARTERIAL: 268.4 MM HG (ref 75–108)
PO2 ARTERIAL: 278.9 MM HG (ref 75–108)
PO2 ARTERIAL: 393 MM HG (ref 75–108)
PO2 ARTERIAL: 437 MM HG (ref 75–108)
POC HEMATOCRIT: 23 % (ref 40.5–52.5)
POC HEMATOCRIT: 24 % (ref 40.5–52.5)
POC HEMATOCRIT: 24 % (ref 40.5–52.5)
POC HEMATOCRIT: 30 % (ref 40.5–52.5)
POC HEMATOCRIT: 30 % (ref 40.5–52.5)
POC HEMATOCRIT: 31 % (ref 40.5–52.5)
POC HEMATOCRIT: 37 % (ref 40.5–52.5)
POC PATIENT TEMP: 101
POC PATIENT TEMP: 99
POC POTASSIUM: 3.3 MMOL/L (ref 3.5–5.1)
POC POTASSIUM: 3.6 MMOL/L (ref 3.5–5.1)
POC POTASSIUM: 3.7 MMOL/L (ref 3.5–5.1)
POC POTASSIUM: 3.9 MMOL/L (ref 3.5–5.1)
POC POTASSIUM: 4 MMOL/L (ref 3.5–5.1)
POC POTASSIUM: 4.1 MMOL/L (ref 3.5–5.1)
POC POTASSIUM: 4.1 MMOL/L (ref 3.5–5.1)
POC POTASSIUM: 4.2 MMOL/L (ref 3.5–5.1)
POC POTASSIUM: 4.2 MMOL/L (ref 3.5–5.1)
POC POTASSIUM: 4.4 MMOL/L (ref 3.5–5.1)
POC POTASSIUM: 5 MMOL/L (ref 3.5–5.1)
POC SAMPLE TYPE: ABNORMAL
POC SODIUM: 139 MMOL/L (ref 136–145)
POC SODIUM: 139 MMOL/L (ref 136–145)
POC SODIUM: 140 MMOL/L (ref 136–145)
POC SODIUM: 140 MMOL/L (ref 136–145)
POC SODIUM: 141 MMOL/L (ref 136–145)
POC SODIUM: 143 MMOL/L (ref 136–145)
POC SODIUM: 144 MMOL/L (ref 136–145)
POTASSIUM SERPL-SCNC: 3.6 MMOL/L (ref 3.5–5.1)
POTASSIUM SERPL-SCNC: 3.6 MMOL/L (ref 3.5–5.1)
PROTHROMBIN TIME: 13.1 SEC (ref 10–13.2)
RBC # BLD: 3.62 M/UL (ref 4.2–5.9)
RBC # BLD: 4.17 M/UL (ref 4.2–5.9)
SODIUM BLD-SCNC: 139 MMOL/L (ref 136–145)
SODIUM BLD-SCNC: 139 MMOL/L (ref 136–145)
TCO2 ARTERIAL: 23 MMOL/L
TCO2 ARTERIAL: 24 MMOL/L
TCO2 ARTERIAL: 24 MMOL/L
TCO2 ARTERIAL: 25 MMOL/L
TCO2 ARTERIAL: 25 MMOL/L
TCO2 ARTERIAL: 26 MMOL/L
WBC # BLD: 15 K/UL (ref 4–11)
WBC # BLD: 6.7 K/UL (ref 4–11)

## 2021-01-08 PROCEDURE — 2720000010 HC SURG SUPPLY STERILE: Performed by: THORACIC SURGERY (CARDIOTHORACIC VASCULAR SURGERY)

## 2021-01-08 PROCEDURE — 0W9B3ZZ DRAINAGE OF LEFT PLEURAL CAVITY, PERCUTANEOUS APPROACH: ICD-10-PCS | Performed by: RADIOLOGY

## 2021-01-08 PROCEDURE — B24BZZ4 ULTRASONOGRAPHY OF HEART WITH AORTA, TRANSESOPHAGEAL: ICD-10-PCS | Performed by: ANESTHESIOLOGY

## 2021-01-08 PROCEDURE — 3700000000 HC ANESTHESIA ATTENDED CARE: Performed by: THORACIC SURGERY (CARDIOTHORACIC VASCULAR SURGERY)

## 2021-01-08 PROCEDURE — 85384 FIBRINOGEN ACTIVITY: CPT

## 2021-01-08 PROCEDURE — 6360000002 HC RX W HCPCS: Performed by: THORACIC SURGERY (CARDIOTHORACIC VASCULAR SURGERY)

## 2021-01-08 PROCEDURE — 6370000000 HC RX 637 (ALT 250 FOR IP): Performed by: NURSE PRACTITIONER

## 2021-01-08 PROCEDURE — 85027 COMPLETE CBC AUTOMATED: CPT

## 2021-01-08 PROCEDURE — 021309W BYPASS CORONARY ARTERY, FOUR OR MORE ARTERIES FROM AORTA WITH AUTOLOGOUS VENOUS TISSUE, OPEN APPROACH: ICD-10-PCS | Performed by: THORACIC SURGERY (CARDIOTHORACIC VASCULAR SURGERY)

## 2021-01-08 PROCEDURE — C9290 INJ, BUPIVACAINE LIPOSOME: HCPCS | Performed by: THORACIC SURGERY (CARDIOTHORACIC VASCULAR SURGERY)

## 2021-01-08 PROCEDURE — 3600000008 HC SURGERY OHS BASE: Performed by: THORACIC SURGERY (CARDIOTHORACIC VASCULAR SURGERY)

## 2021-01-08 PROCEDURE — 5A1935Z RESPIRATORY VENTILATION, LESS THAN 24 CONSECUTIVE HOURS: ICD-10-PCS | Performed by: THORACIC SURGERY (CARDIOTHORACIC VASCULAR SURGERY)

## 2021-01-08 PROCEDURE — 02100Z9 BYPASS CORONARY ARTERY, ONE ARTERY FROM LEFT INTERNAL MAMMARY, OPEN APPROACH: ICD-10-PCS | Performed by: THORACIC SURGERY (CARDIOTHORACIC VASCULAR SURGERY)

## 2021-01-08 PROCEDURE — 6370000000 HC RX 637 (ALT 250 FOR IP): Performed by: THORACIC SURGERY (CARDIOTHORACIC VASCULAR SURGERY)

## 2021-01-08 PROCEDURE — 06BQ4ZZ EXCISION OF LEFT SAPHENOUS VEIN, PERCUTANEOUS ENDOSCOPIC APPROACH: ICD-10-PCS | Performed by: THORACIC SURGERY (CARDIOTHORACIC VASCULAR SURGERY)

## 2021-01-08 PROCEDURE — 33521 CABG ARTERY-VEIN FOUR: CPT | Performed by: THORACIC SURGERY (CARDIOTHORACIC VASCULAR SURGERY)

## 2021-01-08 PROCEDURE — 33533 CABG ARTERIAL SINGLE: CPT | Performed by: THORACIC SURGERY (CARDIOTHORACIC VASCULAR SURGERY)

## 2021-01-08 PROCEDURE — 2700000000 HC OXYGEN THERAPY PER DAY

## 2021-01-08 PROCEDURE — 94002 VENT MGMT INPAT INIT DAY: CPT

## 2021-01-08 PROCEDURE — 2500000003 HC RX 250 WO HCPCS: Performed by: ANESTHESIOLOGY

## 2021-01-08 PROCEDURE — 84295 ASSAY OF SERUM SODIUM: CPT

## 2021-01-08 PROCEDURE — 02L70CK OCCLUSION OF LEFT ATRIAL APPENDAGE WITH EXTRALUMINAL DEVICE, OPEN APPROACH: ICD-10-PCS | Performed by: THORACIC SURGERY (CARDIOTHORACIC VASCULAR SURGERY)

## 2021-01-08 PROCEDURE — 2580000003 HC RX 258: Performed by: INTERNAL MEDICINE

## 2021-01-08 PROCEDURE — 82803 BLOOD GASES ANY COMBINATION: CPT

## 2021-01-08 PROCEDURE — 36415 COLL VENOUS BLD VENIPUNCTURE: CPT

## 2021-01-08 PROCEDURE — 2140000000 HC CCU INTERMEDIATE R&B

## 2021-01-08 PROCEDURE — 82947 ASSAY GLUCOSE BLOOD QUANT: CPT

## 2021-01-08 PROCEDURE — 2500000003 HC RX 250 WO HCPCS: Performed by: THORACIC SURGERY (CARDIOTHORACIC VASCULAR SURGERY)

## 2021-01-08 PROCEDURE — 82330 ASSAY OF CALCIUM: CPT

## 2021-01-08 PROCEDURE — 94669 MECHANICAL CHEST WALL OSCILL: CPT

## 2021-01-08 PROCEDURE — 33508 ENDOSCOPIC VEIN HARVEST: CPT | Performed by: THORACIC SURGERY (CARDIOTHORACIC VASCULAR SURGERY)

## 2021-01-08 PROCEDURE — 85610 PROTHROMBIN TIME: CPT

## 2021-01-08 PROCEDURE — 7100000010 HC PHASE II RECOVERY - FIRST 15 MIN

## 2021-01-08 PROCEDURE — 3700000001 HC ADD 15 MINUTES (ANESTHESIA): Performed by: THORACIC SURGERY (CARDIOTHORACIC VASCULAR SURGERY)

## 2021-01-08 PROCEDURE — C1729 CATH, DRAINAGE: HCPCS | Performed by: THORACIC SURGERY (CARDIOTHORACIC VASCULAR SURGERY)

## 2021-01-08 PROCEDURE — C1889 IMPLANT/INSERT DEVICE, NOC: HCPCS | Performed by: THORACIC SURGERY (CARDIOTHORACIC VASCULAR SURGERY)

## 2021-01-08 PROCEDURE — 7100000011 HC PHASE II RECOVERY - ADDTL 15 MIN

## 2021-01-08 PROCEDURE — 94640 AIRWAY INHALATION TREATMENT: CPT

## 2021-01-08 PROCEDURE — 6370000000 HC RX 637 (ALT 250 FOR IP): Performed by: ANESTHESIOLOGY

## 2021-01-08 PROCEDURE — 83605 ASSAY OF LACTIC ACID: CPT

## 2021-01-08 PROCEDURE — 85347 COAGULATION TIME ACTIVATED: CPT

## 2021-01-08 PROCEDURE — C1751 CATH, INF, PER/CENT/MIDLINE: HCPCS | Performed by: THORACIC SURGERY (CARDIOTHORACIC VASCULAR SURGERY)

## 2021-01-08 PROCEDURE — 2580000003 HC RX 258: Performed by: NURSE PRACTITIONER

## 2021-01-08 PROCEDURE — 83735 ASSAY OF MAGNESIUM: CPT

## 2021-01-08 PROCEDURE — 6360000002 HC RX W HCPCS: Performed by: NURSE PRACTITIONER

## 2021-01-08 PROCEDURE — 2580000003 HC RX 258: Performed by: THORACIC SURGERY (CARDIOTHORACIC VASCULAR SURGERY)

## 2021-01-08 PROCEDURE — 2580000003 HC RX 258: Performed by: ANESTHESIOLOGY

## 2021-01-08 PROCEDURE — 85730 THROMBOPLASTIN TIME PARTIAL: CPT

## 2021-01-08 PROCEDURE — 2100000000 HC CCU R&B

## 2021-01-08 PROCEDURE — 3600000018 HC SURGERY OHS ADDTL 15MIN: Performed by: THORACIC SURGERY (CARDIOTHORACIC VASCULAR SURGERY)

## 2021-01-08 PROCEDURE — 85014 HEMATOCRIT: CPT

## 2021-01-08 PROCEDURE — 2709999900 HC NON-CHARGEABLE SUPPLY: Performed by: THORACIC SURGERY (CARDIOTHORACIC VASCULAR SURGERY)

## 2021-01-08 PROCEDURE — 84132 ASSAY OF SERUM POTASSIUM: CPT

## 2021-01-08 PROCEDURE — 80048 BASIC METABOLIC PNL TOTAL CA: CPT

## 2021-01-08 PROCEDURE — 94761 N-INVAS EAR/PLS OXIMETRY MLT: CPT

## 2021-01-08 PROCEDURE — 71045 X-RAY EXAM CHEST 1 VIEW: CPT

## 2021-01-08 PROCEDURE — C9113 INJ PANTOPRAZOLE SODIUM, VIA: HCPCS | Performed by: NURSE PRACTITIONER

## 2021-01-08 PROCEDURE — 6370000000 HC RX 637 (ALT 250 FOR IP)

## 2021-01-08 PROCEDURE — 6360000002 HC RX W HCPCS: Performed by: ANESTHESIOLOGY

## 2021-01-08 DEVICE — Z INACTIVE USE 2424443 DEVICE OCCL CLP L40MM SM FOOTPRINT 1 HND APPL SUTURELESS W/: Type: IMPLANTABLE DEVICE | Status: FUNCTIONAL

## 2021-01-08 RX ORDER — DEXTROSE MONOHYDRATE 50 MG/ML
100 INJECTION, SOLUTION INTRAVENOUS PRN
Status: DISCONTINUED | OUTPATIENT
Start: 2021-01-08 | End: 2021-01-11

## 2021-01-08 RX ORDER — ROCURONIUM BROMIDE 10 MG/ML
INJECTION, SOLUTION INTRAVENOUS PRN
Status: DISCONTINUED | OUTPATIENT
Start: 2021-01-08 | End: 2021-01-08 | Stop reason: SDUPTHER

## 2021-01-08 RX ORDER — PANTOPRAZOLE SODIUM 40 MG/10ML
40 INJECTION, POWDER, LYOPHILIZED, FOR SOLUTION INTRAVENOUS DAILY
Status: DISCONTINUED | OUTPATIENT
Start: 2021-01-08 | End: 2021-01-11

## 2021-01-08 RX ORDER — SENNA AND DOCUSATE SODIUM 50; 8.6 MG/1; MG/1
1 TABLET, FILM COATED ORAL 2 TIMES DAILY
Status: DISCONTINUED | OUTPATIENT
Start: 2021-01-08 | End: 2021-01-15 | Stop reason: HOSPADM

## 2021-01-08 RX ORDER — ALBUTEROL SULFATE 2.5 MG/3ML
2.5 SOLUTION RESPIRATORY (INHALATION)
Status: DISCONTINUED | OUTPATIENT
Start: 2021-01-08 | End: 2021-01-12

## 2021-01-08 RX ORDER — FENTANYL CITRATE 50 UG/ML
INJECTION, SOLUTION INTRAMUSCULAR; INTRAVENOUS PRN
Status: DISCONTINUED | OUTPATIENT
Start: 2021-01-08 | End: 2021-01-08 | Stop reason: SDUPTHER

## 2021-01-08 RX ORDER — FENTANYL CITRATE 50 UG/ML
25 INJECTION, SOLUTION INTRAMUSCULAR; INTRAVENOUS
Status: DISCONTINUED | OUTPATIENT
Start: 2021-01-08 | End: 2021-01-15 | Stop reason: HOSPADM

## 2021-01-08 RX ORDER — ACETAMINOPHEN 650 MG/1
650 SUPPOSITORY RECTAL EVERY 4 HOURS PRN
Status: DISCONTINUED | OUTPATIENT
Start: 2021-01-08 | End: 2021-01-15 | Stop reason: HOSPADM

## 2021-01-08 RX ORDER — ASPIRIN 300 MG/1
150 SUPPOSITORY RECTAL DAILY
Status: COMPLETED | OUTPATIENT
Start: 2021-01-08 | End: 2021-01-08

## 2021-01-08 RX ORDER — ETOMIDATE 2 MG/ML
INJECTION INTRAVENOUS PRN
Status: DISCONTINUED | OUTPATIENT
Start: 2021-01-08 | End: 2021-01-08 | Stop reason: SDUPTHER

## 2021-01-08 RX ORDER — PANTOPRAZOLE SODIUM 40 MG/1
40 TABLET, DELAYED RELEASE ORAL DAILY
Status: DISCONTINUED | OUTPATIENT
Start: 2021-01-08 | End: 2021-01-15 | Stop reason: HOSPADM

## 2021-01-08 RX ORDER — CALCIUM CHLORIDE 100 MG/ML
INJECTION INTRAVENOUS; INTRAVENTRICULAR
Status: COMPLETED | OUTPATIENT
Start: 2021-01-08 | End: 2021-01-08

## 2021-01-08 RX ORDER — OXYCODONE HYDROCHLORIDE 5 MG/1
10 TABLET ORAL EVERY 4 HOURS PRN
Status: DISCONTINUED | OUTPATIENT
Start: 2021-01-08 | End: 2021-01-15 | Stop reason: HOSPADM

## 2021-01-08 RX ORDER — NICOTINE POLACRILEX 4 MG
15 LOZENGE BUCCAL PRN
Status: DISCONTINUED | OUTPATIENT
Start: 2021-01-08 | End: 2021-01-11

## 2021-01-08 RX ORDER — METOPROLOL TARTRATE 5 MG/5ML
2.5 INJECTION INTRAVENOUS EVERY 10 MIN PRN
Status: DISCONTINUED | OUTPATIENT
Start: 2021-01-08 | End: 2021-01-15 | Stop reason: HOSPADM

## 2021-01-08 RX ORDER — HEPARIN SODIUM 1000 [USP'U]/ML
INJECTION, SOLUTION INTRAVENOUS; SUBCUTANEOUS PRN
Status: DISCONTINUED | OUTPATIENT
Start: 2021-01-08 | End: 2021-01-08 | Stop reason: SDUPTHER

## 2021-01-08 RX ORDER — POTASSIUM CHLORIDE 29.8 MG/ML
20 INJECTION INTRAVENOUS PRN
Status: DISCONTINUED | OUTPATIENT
Start: 2021-01-08 | End: 2021-01-15 | Stop reason: HOSPADM

## 2021-01-08 RX ORDER — SODIUM CHLORIDE 9 MG/ML
10 INJECTION INTRAVENOUS DAILY
Status: DISCONTINUED | OUTPATIENT
Start: 2021-01-08 | End: 2021-01-11

## 2021-01-08 RX ORDER — 0.9 % SODIUM CHLORIDE 0.9 %
500 INTRAVENOUS SOLUTION INTRAVENOUS CONTINUOUS PRN
Status: DISCONTINUED | OUTPATIENT
Start: 2021-01-08 | End: 2021-01-11

## 2021-01-08 RX ORDER — BUPIVACAINE HYDROCHLORIDE 2.5 MG/ML
INJECTION, SOLUTION EPIDURAL; INFILTRATION; INTRACAUDAL
Status: COMPLETED | OUTPATIENT
Start: 2021-01-08 | End: 2021-01-08

## 2021-01-08 RX ORDER — MIDAZOLAM HYDROCHLORIDE 5 MG/ML
INJECTION INTRAMUSCULAR; INTRAVENOUS PRN
Status: DISCONTINUED | OUTPATIENT
Start: 2021-01-08 | End: 2021-01-08 | Stop reason: SDUPTHER

## 2021-01-08 RX ORDER — DEXTROSE MONOHYDRATE 25 G/50ML
12.5 INJECTION, SOLUTION INTRAVENOUS PRN
Status: DISCONTINUED | OUTPATIENT
Start: 2021-01-08 | End: 2021-01-11

## 2021-01-08 RX ORDER — HYDRALAZINE HYDROCHLORIDE 20 MG/ML
5 INJECTION INTRAMUSCULAR; INTRAVENOUS EVERY 5 MIN PRN
Status: DISCONTINUED | OUTPATIENT
Start: 2021-01-08 | End: 2021-01-15 | Stop reason: HOSPADM

## 2021-01-08 RX ORDER — SODIUM CHLORIDE 9 MG/ML
INJECTION, SOLUTION INTRAVENOUS CONTINUOUS
Status: DISCONTINUED | OUTPATIENT
Start: 2021-01-08 | End: 2021-01-11

## 2021-01-08 RX ORDER — FUROSEMIDE 10 MG/ML
20 INJECTION INTRAMUSCULAR; INTRAVENOUS 2 TIMES DAILY
Status: DISCONTINUED | OUTPATIENT
Start: 2021-01-09 | End: 2021-01-11

## 2021-01-08 RX ORDER — MIDAZOLAM HYDROCHLORIDE 2 MG/2ML
1 INJECTION, SOLUTION INTRAMUSCULAR; INTRAVENOUS
Status: ACTIVE | OUTPATIENT
Start: 2021-01-08 | End: 2021-01-09

## 2021-01-08 RX ORDER — LANOLIN ALCOHOL/MO/W.PET/CERES
400 CREAM (GRAM) TOPICAL 2 TIMES DAILY
Status: DISCONTINUED | OUTPATIENT
Start: 2021-01-09 | End: 2021-01-15

## 2021-01-08 RX ORDER — POTASSIUM CHLORIDE 750 MG/1
10 TABLET, FILM COATED, EXTENDED RELEASE ORAL
Status: DISCONTINUED | OUTPATIENT
Start: 2021-01-09 | End: 2021-01-15 | Stop reason: HOSPADM

## 2021-01-08 RX ORDER — ESMOLOL HYDROCHLORIDE 10 MG/ML
5 INJECTION INTRAVENOUS ONCE
Status: COMPLETED | OUTPATIENT
Start: 2021-01-08 | End: 2021-01-08

## 2021-01-08 RX ORDER — OXYCODONE HYDROCHLORIDE 5 MG/1
5 TABLET ORAL EVERY 4 HOURS PRN
Status: DISCONTINUED | OUTPATIENT
Start: 2021-01-08 | End: 2021-01-15 | Stop reason: HOSPADM

## 2021-01-08 RX ORDER — CEFAZOLIN SODIUM 1 G/3ML
INJECTION, POWDER, FOR SOLUTION INTRAMUSCULAR; INTRAVENOUS PRN
Status: DISCONTINUED | OUTPATIENT
Start: 2021-01-08 | End: 2021-01-08 | Stop reason: SDUPTHER

## 2021-01-08 RX ORDER — ATORVASTATIN CALCIUM 40 MG/1
40 TABLET, FILM COATED ORAL NIGHTLY
Status: DISCONTINUED | OUTPATIENT
Start: 2021-01-09 | End: 2021-01-15 | Stop reason: HOSPADM

## 2021-01-08 RX ORDER — SODIUM CHLORIDE 0.9 % (FLUSH) 0.9 %
10 SYRINGE (ML) INJECTION PRN
Status: DISCONTINUED | OUTPATIENT
Start: 2021-01-08 | End: 2021-01-15 | Stop reason: HOSPADM

## 2021-01-08 RX ORDER — MAGNESIUM SULFATE IN WATER 40 MG/ML
2 INJECTION, SOLUTION INTRAVENOUS PRN
Status: DISCONTINUED | OUTPATIENT
Start: 2021-01-08 | End: 2021-01-15 | Stop reason: HOSPADM

## 2021-01-08 RX ORDER — ONDANSETRON 2 MG/ML
4 INJECTION INTRAMUSCULAR; INTRAVENOUS EVERY 8 HOURS PRN
Status: DISCONTINUED | OUTPATIENT
Start: 2021-01-08 | End: 2021-01-15 | Stop reason: HOSPADM

## 2021-01-08 RX ORDER — PROTAMINE SULFATE 10 MG/ML
INJECTION, SOLUTION INTRAVENOUS PRN
Status: DISCONTINUED | OUTPATIENT
Start: 2021-01-08 | End: 2021-01-08 | Stop reason: SDUPTHER

## 2021-01-08 RX ORDER — PROTAMINE SULFATE 10 MG/ML
50 INJECTION, SOLUTION INTRAVENOUS
Status: DISPENSED | OUTPATIENT
Start: 2021-01-08 | End: 2021-01-08

## 2021-01-08 RX ORDER — ACETAMINOPHEN 500 MG
1000 TABLET ORAL EVERY 6 HOURS
Status: DISCONTINUED | OUTPATIENT
Start: 2021-01-08 | End: 2021-01-15 | Stop reason: HOSPADM

## 2021-01-08 RX ORDER — SODIUM CHLORIDE 9 MG/ML
INJECTION, SOLUTION INTRAVENOUS CONTINUOUS PRN
Status: DISCONTINUED | OUTPATIENT
Start: 2021-01-08 | End: 2021-01-08 | Stop reason: SDUPTHER

## 2021-01-08 RX ORDER — ALBUMIN, HUMAN INJ 5% 5 %
25 SOLUTION INTRAVENOUS PRN
Status: DISCONTINUED | OUTPATIENT
Start: 2021-01-08 | End: 2021-01-11

## 2021-01-08 RX ORDER — DIPHENHYDRAMINE HCL 25 MG
25 TABLET ORAL NIGHTLY PRN
Status: DISCONTINUED | OUTPATIENT
Start: 2021-01-09 | End: 2021-01-15 | Stop reason: HOSPADM

## 2021-01-08 RX ORDER — LISINOPRIL 5 MG/1
2.5 TABLET ORAL
Status: DISCONTINUED | OUTPATIENT
Start: 2021-01-12 | End: 2021-01-15 | Stop reason: HOSPADM

## 2021-01-08 RX ORDER — MEPERIDINE HYDROCHLORIDE 50 MG/ML
25 INJECTION INTRAMUSCULAR; INTRAVENOUS; SUBCUTANEOUS
Status: ACTIVE | OUTPATIENT
Start: 2021-01-08 | End: 2021-01-08

## 2021-01-08 RX ORDER — MILRINONE LACTATE 0.2 MG/ML
0.38 INJECTION, SOLUTION INTRAVENOUS CONTINUOUS PRN
Status: DISCONTINUED | OUTPATIENT
Start: 2021-01-08 | End: 2021-01-11

## 2021-01-08 RX ORDER — MAGNESIUM HYDROXIDE 1200 MG/15ML
LIQUID ORAL CONTINUOUS PRN
Status: COMPLETED | OUTPATIENT
Start: 2021-01-08 | End: 2021-01-08

## 2021-01-08 RX ORDER — FONDAPARINUX SODIUM 2.5 MG/.5ML
2.5 INJECTION SUBCUTANEOUS DAILY
Status: DISCONTINUED | OUTPATIENT
Start: 2021-01-09 | End: 2021-01-15 | Stop reason: HOSPADM

## 2021-01-08 RX ORDER — SODIUM CHLORIDE 0.9 % (FLUSH) 0.9 %
10 SYRINGE (ML) INJECTION EVERY 12 HOURS SCHEDULED
Status: DISCONTINUED | OUTPATIENT
Start: 2021-01-08 | End: 2021-01-15 | Stop reason: HOSPADM

## 2021-01-08 RX ORDER — SUCCINYLCHOLINE CHLORIDE 20 MG/ML
INJECTION INTRAMUSCULAR; INTRAVENOUS PRN
Status: DISCONTINUED | OUTPATIENT
Start: 2021-01-08 | End: 2021-01-08 | Stop reason: SDUPTHER

## 2021-01-08 RX ORDER — ALBUTEROL SULFATE 90 UG/1
2 AEROSOL, METERED RESPIRATORY (INHALATION) EVERY 6 HOURS PRN
Status: DISCONTINUED | OUTPATIENT
Start: 2021-01-08 | End: 2021-01-15 | Stop reason: HOSPADM

## 2021-01-08 RX ORDER — INSULIN LISPRO 100 [IU]/ML
0-12 INJECTION, SOLUTION INTRAVENOUS; SUBCUTANEOUS
Status: DISCONTINUED | OUTPATIENT
Start: 2021-01-08 | End: 2021-01-15 | Stop reason: HOSPADM

## 2021-01-08 RX ADMIN — VANCOMYCIN HYDROCHLORIDE 1500 MG: 10 INJECTION, POWDER, LYOPHILIZED, FOR SOLUTION INTRAVENOUS at 18:48

## 2021-01-08 RX ADMIN — CEFAZOLIN SODIUM 2 G: 10 INJECTION, POWDER, FOR SOLUTION INTRAVENOUS at 21:08

## 2021-01-08 RX ADMIN — POTASSIUM CHLORIDE 20 MEQ: 29.8 INJECTION, SOLUTION INTRAVENOUS at 15:28

## 2021-01-08 RX ADMIN — GABAPENTIN 600 MG: 300 CAPSULE ORAL at 05:04

## 2021-01-08 RX ADMIN — ALBUTEROL SULFATE 2.5 MG: 2.5 SOLUTION RESPIRATORY (INHALATION) at 21:08

## 2021-01-08 RX ADMIN — Medication 10 ML: at 20:23

## 2021-01-08 RX ADMIN — SODIUM CHLORIDE: 9 INJECTION, SOLUTION INTRAVENOUS at 07:10

## 2021-01-08 RX ADMIN — SODIUM CHLORIDE: 9 INJECTION, SOLUTION INTRAVENOUS at 14:11

## 2021-01-08 RX ADMIN — VANCOMYCIN HYDROCHLORIDE 1500 MG: 10 INJECTION, POWDER, LYOPHILIZED, FOR SOLUTION INTRAVENOUS at 06:59

## 2021-01-08 RX ADMIN — SODIUM CHLORIDE 5 UNITS/HR: 9 INJECTION, SOLUTION INTRAVENOUS at 07:10

## 2021-01-08 RX ADMIN — AMINOCAPROIC ACID 1 G: 250 INJECTION, SOLUTION INTRAVENOUS at 07:10

## 2021-01-08 RX ADMIN — CEFAZOLIN 2000 MG: 1 INJECTION, POWDER, FOR SOLUTION INTRAVENOUS at 11:10

## 2021-01-08 RX ADMIN — OXYCODONE 5 MG: 5 TABLET ORAL at 20:14

## 2021-01-08 RX ADMIN — MUPIROCIN: 20 OINTMENT TOPICAL at 20:18

## 2021-01-08 RX ADMIN — ESMOLOL HYDROCHLORIDE 5 MG: 100 INJECTION, SOLUTION INTRAVENOUS at 06:59

## 2021-01-08 RX ADMIN — MIDAZOLAM 5 MG: 5 INJECTION INTRAMUSCULAR; INTRAVENOUS at 09:24

## 2021-01-08 RX ADMIN — FENTANYL CITRATE 250 MCG: 50 INJECTION, SOLUTION INTRAMUSCULAR; INTRAVENOUS at 07:10

## 2021-01-08 RX ADMIN — PANTOPRAZOLE SODIUM 40 MG: 40 INJECTION, POWDER, FOR SOLUTION INTRAVENOUS at 05:03

## 2021-01-08 RX ADMIN — SODIUM CHLORIDE 1.32 UNITS/HR: 9 INJECTION, SOLUTION INTRAVENOUS at 14:23

## 2021-01-08 RX ADMIN — ROCURONIUM BROMIDE 50 MG: 10 INJECTION, SOLUTION INTRAVENOUS at 09:24

## 2021-01-08 RX ADMIN — ASPIRIN 150 MG: 300 SUPPOSITORY RECTAL at 16:51

## 2021-01-08 RX ADMIN — POTASSIUM CHLORIDE 20 MEQ: 29.8 INJECTION, SOLUTION INTRAVENOUS at 14:20

## 2021-01-08 RX ADMIN — FENTANYL CITRATE 250 MCG: 50 INJECTION, SOLUTION INTRAMUSCULAR; INTRAVENOUS at 09:24

## 2021-01-08 RX ADMIN — CEFAZOLIN 2000 MG: 1 INJECTION, POWDER, FOR SOLUTION INTRAVENOUS at 07:10

## 2021-01-08 RX ADMIN — ETOMIDATE 20 MG: 20 INJECTION, SOLUTION INTRAVENOUS at 07:10

## 2021-01-08 RX ADMIN — FENTANYL CITRATE 25 MCG: 50 INJECTION, SOLUTION INTRAMUSCULAR; INTRAVENOUS at 17:29

## 2021-01-08 RX ADMIN — ACETAMINOPHEN 1000 MG: 500 TABLET, FILM COATED ORAL at 20:13

## 2021-01-08 RX ADMIN — CEFAZOLIN SODIUM 2 G: 10 INJECTION, POWDER, FOR SOLUTION INTRAVENOUS at 15:33

## 2021-01-08 RX ADMIN — SODIUM CHLORIDE 4.4 UNITS/HR: 9 INJECTION, SOLUTION INTRAVENOUS at 19:50

## 2021-01-08 RX ADMIN — HEPARIN SODIUM 10000 UNITS: 1000 INJECTION INTRAVENOUS; SUBCUTANEOUS at 09:20

## 2021-01-08 RX ADMIN — SODIUM CHLORIDE: 9 INJECTION, SOLUTION INTRAVENOUS at 05:55

## 2021-01-08 RX ADMIN — PROTAMINE SULFATE 250 MG: 10 INJECTION, SOLUTION INTRAVENOUS at 12:12

## 2021-01-08 RX ADMIN — ROCURONIUM BROMIDE 50 MG: 10 INJECTION, SOLUTION INTRAVENOUS at 07:10

## 2021-01-08 RX ADMIN — Medication 10 ML: at 15:00

## 2021-01-08 RX ADMIN — HEPARIN SODIUM 25000 UNITS: 1000 INJECTION INTRAVENOUS; SUBCUTANEOUS at 09:12

## 2021-01-08 RX ADMIN — NOREPINEPHRINE BITARTRATE 5 MCG/MIN: 1 INJECTION, SOLUTION, CONCENTRATE INTRAVENOUS at 12:21

## 2021-01-08 RX ADMIN — SUCCINYLCHOLINE CHLORIDE 200 MG: 20 INJECTION, SOLUTION INTRAMUSCULAR; INTRAVENOUS at 07:10

## 2021-01-08 RX ADMIN — AMINOCAPROIC ACID 1 G/HR: 250 INJECTION, SOLUTION INTRAVENOUS at 07:10

## 2021-01-08 RX ADMIN — Medication 10 ML: at 20:22

## 2021-01-08 RX ADMIN — DESMOPRESSIN ACETATE 20 MCG: 4 SOLUTION INTRAVENOUS at 12:41

## 2021-01-08 RX ADMIN — MIDAZOLAM 5 MG: 5 INJECTION INTRAMUSCULAR; INTRAVENOUS at 07:10

## 2021-01-08 RX ADMIN — LATANOPROST 1 DROP: 50 SOLUTION OPHTHALMIC at 20:20

## 2021-01-08 ASSESSMENT — PULMONARY FUNCTION TESTS
PIF_VALUE: 24
PIF_VALUE: 27
PIF_VALUE: 20
PIF_VALUE: 23
PIF_VALUE: 1
PIF_VALUE: 22
PIF_VALUE: 20
PIF_VALUE: 1
PIF_VALUE: 23
PIF_VALUE: 20
PIF_VALUE: 23
PIF_VALUE: 1
PIF_VALUE: 24
PIF_VALUE: 1
PIF_VALUE: 26
PIF_VALUE: 1
PIF_VALUE: 25
PIF_VALUE: 24
PIF_VALUE: 1
PIF_VALUE: 23
PIF_VALUE: 1
PIF_VALUE: 23
PIF_VALUE: 22
PIF_VALUE: 23
PIF_VALUE: 1
PIF_VALUE: 3
PIF_VALUE: 20
PIF_VALUE: 22
PIF_VALUE: 25
PIF_VALUE: 23
PIF_VALUE: 22
PIF_VALUE: 23
PIF_VALUE: 24
PIF_VALUE: 24
PIF_VALUE: 26
PIF_VALUE: 21
PIF_VALUE: 22
PIF_VALUE: 2
PIF_VALUE: 24
PIF_VALUE: 22
PIF_VALUE: 23
PIF_VALUE: 1
PIF_VALUE: 23
PIF_VALUE: 1
PIF_VALUE: 2
PIF_VALUE: 23
PIF_VALUE: 2
PIF_VALUE: 2
PIF_VALUE: 1
PIF_VALUE: 22
PIF_VALUE: 20
PIF_VALUE: 1
PIF_VALUE: 25
PIF_VALUE: 22
PIF_VALUE: 23
PIF_VALUE: 20
PIF_VALUE: 1
PIF_VALUE: 1
PIF_VALUE: 23
PIF_VALUE: 23
PIF_VALUE: 22
PIF_VALUE: 2
PIF_VALUE: 17
PIF_VALUE: 1
PIF_VALUE: 23
PIF_VALUE: 1
PIF_VALUE: 2
PIF_VALUE: 2
PIF_VALUE: 24
PIF_VALUE: 23
PIF_VALUE: 20
PIF_VALUE: 20
PIF_VALUE: 24
PIF_VALUE: 1
PIF_VALUE: 23
PIF_VALUE: 23
PIF_VALUE: 24
PIF_VALUE: 20
PIF_VALUE: 2
PIF_VALUE: 22
PIF_VALUE: 24
PIF_VALUE: 20
PIF_VALUE: 24
PIF_VALUE: 21
PIF_VALUE: 22
PIF_VALUE: 1
PIF_VALUE: 24
PIF_VALUE: 23
PIF_VALUE: 21
PIF_VALUE: 1
PIF_VALUE: 1
PIF_VALUE: 22
PIF_VALUE: 23
PIF_VALUE: 2
PIF_VALUE: 20
PIF_VALUE: 20
PIF_VALUE: 1
PIF_VALUE: 22
PIF_VALUE: 1
PIF_VALUE: 24
PIF_VALUE: 1
PIF_VALUE: 2
PIF_VALUE: 20
PIF_VALUE: 24
PIF_VALUE: 23
PIF_VALUE: 2
PIF_VALUE: 1
PIF_VALUE: 23
PIF_VALUE: 24
PIF_VALUE: 21
PIF_VALUE: 21
PIF_VALUE: 1
PIF_VALUE: 22
PIF_VALUE: 1
PIF_VALUE: 25
PIF_VALUE: 23
PIF_VALUE: 22
PIF_VALUE: 25
PIF_VALUE: 24
PIF_VALUE: 2
PIF_VALUE: 1
PIF_VALUE: 22
PIF_VALUE: 2
PIF_VALUE: 21
PIF_VALUE: 23
PIF_VALUE: 21
PIF_VALUE: 24
PIF_VALUE: 1
PIF_VALUE: 24
PIF_VALUE: 21
PIF_VALUE: 25
PIF_VALUE: 2
PIF_VALUE: 2
PIF_VALUE: 25
PIF_VALUE: 1
PIF_VALUE: 22
PIF_VALUE: 26
PIF_VALUE: 21
PIF_VALUE: 20
PIF_VALUE: 1
PIF_VALUE: 21
PIF_VALUE: 23
PIF_VALUE: 2
PIF_VALUE: 1
PIF_VALUE: 23
PIF_VALUE: 22
PIF_VALUE: 23
PIF_VALUE: 23
PIF_VALUE: 1
PIF_VALUE: 23
PIF_VALUE: 21
PIF_VALUE: 24
PIF_VALUE: 2
PIF_VALUE: 2
PIF_VALUE: 22
PIF_VALUE: 22
PIF_VALUE: 1
PIF_VALUE: 20
PIF_VALUE: 24
PIF_VALUE: 23
PIF_VALUE: 24
PIF_VALUE: 24
PIF_VALUE: 20
PIF_VALUE: 2
PIF_VALUE: 24
PIF_VALUE: 23
PIF_VALUE: 20
PIF_VALUE: 24
PIF_VALUE: 1
PIF_VALUE: 22
PIF_VALUE: 1
PIF_VALUE: 1
PIF_VALUE: 24
PIF_VALUE: 23
PIF_VALUE: 21
PIF_VALUE: 24
PIF_VALUE: 25
PIF_VALUE: 1
PIF_VALUE: 1
PIF_VALUE: 2
PIF_VALUE: 22
PIF_VALUE: 3
PIF_VALUE: 22
PIF_VALUE: 1
PIF_VALUE: 2
PIF_VALUE: 23
PIF_VALUE: 2
PIF_VALUE: 1
PIF_VALUE: 22
PIF_VALUE: 20
PIF_VALUE: 1
PIF_VALUE: 2
PIF_VALUE: 23
PIF_VALUE: 22
PIF_VALUE: 25
PIF_VALUE: 23
PIF_VALUE: 1
PIF_VALUE: 21
PIF_VALUE: 1
PIF_VALUE: 23

## 2021-01-08 ASSESSMENT — PAIN SCALES - GENERAL
PAINLEVEL_OUTOF10: 0
PAINLEVEL_OUTOF10: 4
PAINLEVEL_OUTOF10: 2
PAINLEVEL_OUTOF10: 0
PAINLEVEL_OUTOF10: 6

## 2021-01-08 ASSESSMENT — PAIN DESCRIPTION - PAIN TYPE: TYPE: SURGICAL PAIN

## 2021-01-08 ASSESSMENT — PAIN DESCRIPTION - ORIENTATION: ORIENTATION: ANTERIOR;MID

## 2021-01-08 NOTE — PROGRESS NOTES
Comprehensive Nutrition Assessment    Type and Reason for Visit:  Initial, Consult    Nutrition Recommendations/Plan:   1. Continue NPO status  2. Once extubated RD recommends Cardiac 2gm Na diet with 2000 ml fluid restriction    Nutrition Assessment:  Pt is at risk for nutritional compromise d/t increased nutrient needs for wound healing. Pt was eating well pta. Pt wt was stable. Pt s/p CABG. RD to educate family on CVU d/c class for diet education prior to pt being d/c'ed. Malnutrition Assessment:  Malnutrition Status:  No malnutrition      Estimated Daily Nutrient Needs:  Energy (kcal):  0101-0063 cals (20-25 cals/85kg); Weight Used for Energy Requirements:  Current     Protein (g):  87-134gms (1.3-2.0 gms/67kg IBW); Weight Used for Protein Requirements:  Ideal        Fluid (ml/day):   ; Method Used for Fluid Requirements:  1 ml/kcal      Nutrition Related Findings:  I/O's: -816. On Levo and NaCl at 50 ml/hr      Wounds:  Surgical Incision(s/p CABG)       Current Nutrition Therapies:    Diet NPO Time Specified  DIET CARDIAC; Daily Fluid Restriction: 1800 ml; No Caffeine    Anthropometric Measures:  · Height: 5' 7\" (170.2 cm)  · Current Body Weight: 188 lb (85.3 kg)   · Ideal Body Weight: 148 lbs; % Ideal Body Weight     · BMI: 29.4  · Adjusted Body Weight:  ; No Adjustment   · BMI Categories: Overweight (BMI 25.0-29. 9)       Nutrition Diagnosis:   · Increased nutrient needs related to increase demand for energy/nutrients as evidenced by wounds(s/p CABG)      Nutrition Interventions:   Food and/or Nutrient Delivery:  Continue NPO(Advance diet once extubated)  Nutrition Education/Counseling:  Education not indicated   Coordination of Nutrition Care:  Continue to monitor while inpatient    Goals:  Pt extubated and diet advanced by f/u       Nutrition Monitoring and Evaluation:   Behavioral-Environmental Outcomes:  None Identified   Food/Nutrient Intake Outcomes:  Food and Nutrient Intake, Diet Advancement/Tolerance  Physical Signs/Symptoms Outcomes:  Weight, Skin     Discharge Planning:     Too soon to determine     Electronically signed by Kathy Stringer RD, CNSC, LD on 1/8/21 at 2:45 PM EST    Contact: [phone]

## 2021-01-08 NOTE — PROGRESS NOTES
CC: [place], Severe 3V CAD, HLD, Anemia    Stable Overnight, No CP, no SOB    ON heparin gtt    CTAB, AOx3  Abd Soft    Cr:0.9  Hb:12.5    Plan:  Will proceed with CABG/TORREY Clip today    All questions answered

## 2021-01-08 NOTE — ANESTHESIA PROCEDURE NOTES
Procedure Performed: GRETA     Start Time:        End Time:      Preanesthesia Checklist:  Patient identified, IV assessed, risks and benefits discussed, monitors and equipment assessed, procedure being performed at surgeon's request and anesthesia consent obtained. General Procedure Information  Diagnostic Indications for Echo:  assessment of surgical repair, hemodynamic monitoring and assessment of valve function  Location performed:  OR  Intubated  Bite block placed  Heart visualized  Probe Insertion:  Easy  Probe Type:  3D and mulitplane  Modalities:  3D, color flow mapping, pulse wave Doppler and continuous wave Doppler    Echocardiographic and Doppler Measurements    Ventricles    Right Ventricle:  Cavity size normal.  Hypertrophy not present. Thrombus not present. Global function normal.    Left Ventricle:  Cavity size normal.  Hypertrophy not present. Thrombus not present. Global Function normal.      Ventricular Regional Function:  1- Basal Anteroseptal:  normal  2- Basal Anterior:  normal  3- Basal Anterolateral:  normal  4- Basal Inferolateral:  normal  5- Basal Inferior:  normal  6- Basal Inferoseptal:  normal  7- Mid Anteroseptal:  normal  8- Mid Anterior:  normal  9- Mid Anterolateral:  normal  10- Mid Inferolateral:  normal  11- Mid Inferior:  normal  12- Mid Inferoseptal:  normal  13- Apical Anterior:  normal  14- Apical Lateral:  normal  15- Apical Inferior:  normal  16- Apical Septal:  normal  17- [place]:  normal      Valves    Aortic Valve: Annulus normal.  Stenosis not present. Regurgitation none. Leaflets normal.  Leaflet motions normal.      Mitral Valve: Annulus normal.  Stenosis not present. Regurgitation none. Leaflets normal.  Leaflet motions normal.      Tricuspid Valve: Annulus normal.  Stenosis not present. Regurgitation none. Leaflets normal.  Leaflet motions normal.    Pulmonic Valve: Annulus normal.  Stenosis not present. Regurgitation none.           Aorta Ascending Aorta:  Size normal.  Dissection not present. Aortic Arch:  Size normal.  Dissection not present. Descending Aorta:  Size normal.  Dissection not present. Atria    Right Atrium:  Size normal.  Spontaneous echo contrast not present. Thrombus not present. Tumor not present. Device not present. Left Atrium:  Size normal.  Spontaneous echo contrast not present. Thrombus not present. Tumor not present. Device not present.     Left atrial appendage normal.      Septa    Atrial Septum:  Intra-atrial septal morphology normal.      Ventricular Septum:  Intra-ventricular septum morphology normal.          Other Findings  Pericardium:  normal  Pleural Effusion:  none  Pulmonary Arteries:  normal  Pulmonary Venous Flow:  normal    Anesthesia Information  Performed Personally

## 2021-01-08 NOTE — FLOWSHEET NOTE
All questions answered concerning surgery, IS and information given last pm.    All am preop completed.   To [facility] preop with Arsenio Strauss, [address]

## 2021-01-08 NOTE — PROGRESS NOTES
Patient awake and following commands. Patient placed on CPAP per open heart protocol. ABG drawn 30 minutes later and WNL  Respiratory called for weaning parameters. NIF - 24. Patient suctioned and extubated to 3 liters nasal cannula  OG tube discontinued. Patient tolerated well. Oxygen saturation 100% on 3 liters nasal cannula.   Patient alert and oriented X 3.           RECOVERY PERIOD ENDS 1810

## 2021-01-08 NOTE — ANESTHESIA PRE PROCEDURE
Department of Anesthesiology  Preprocedure Note       Name:  Chucky Bro   Age:  72 y.o.  :  1955                                          MRN:  [id number]         Date:  2021      Surgeon: Arturo Lucia):  Hoang York MD    Procedure: Procedure(s):  CABG CORONARY ARTERY BYPASS, LIGATION LEFT ATRIAL APPENDAGE    Medications prior to admission:   Prior to Admission medications    Medication Sig Start Date End Date Taking? Authorizing Provider   meloxicam (MOBIC) 15 MG tablet Take 1 tablet by mouth daily 21   Equilla Goldmann, MD   latanoprost (XALATAN) 0.005 % ophthalmic solution  20   Historical Provider, MD       Current medications:    No current facility-administered medications for this visit. No current outpatient medications on file.      Facility-Administered Medications Ordered in Other Visits   Medication Dose Route Frequency Provider Last Rate Last Admin    aminocaproic acid (AMICAR) 5,000 mg in sodium chloride 0.9 % 250 mL infusion bolus  5 g Intravenous Once Hoang York MD        insulin regular (HUMULIN R;NOVOLIN R) 100 Units in sodium chloride 0.9 % 100 mL infusion  0.1 Units/kg/hr Intravenous Once Hoang York MD        norepinephrine (LEVOPHED) 16 mg in dextrose 5 % 250 mL infusion  2 mcg/min Intravenous Once Hoang York MD        phenylephrine (MARIAM-SYNEPHRINE) 50 mg in dextrose 5 % 250 mL infusion  100 mcg/min Intravenous Once Hoang York MD        esmolol (BREVIBLOC) injection 5 mg  5 mg Intravenous Once Jody Arias MD        sodium chloride flush 0.9 % injection 10 mL  10 mL Intravenous 2 times per day Rosiland Muhlenberg, DO        sodium chloride flush 0.9 % injection 10 mL  10 mL Intravenous PRN Rosiland Muhlenberg, DO        acetaminophen (TYLENOL) tablet 650 mg  650 mg Oral Q4H PRN Rosiland Muhlenberg, DO        aspirin EC tablet 325 mg  325 mg Oral Daily Rosilanida Judge DO  atorvastatin (LIPITOR) tablet 40 mg  40 mg Oral Nightly Marylee Milwaukee, DO   40 mg at 01/07/21 2133    metoprolol tartrate (LOPRESSOR) tablet 25 mg  25 mg Oral BID Marylee Milwaukee, DO   25 mg at 01/07/21 2133    latanoprost (XALATAN) 0.005 % ophthalmic solution 1 drop  1 drop Both Eyes Nightly Marylee Milwaukee, DO   1 drop at 01/07/21 2137    heparin (porcine) injection 4,000 Units  4,000 Units Intravenous PRN Marylee Milwaukee, DO        heparin (porcine) injection 2,000 Units  2,000 Units Intravenous PRN Marylee Milwaukee, DO        heparin 25,000 units in dextrose 5% 250 mL infusion  11.54 Units/kg/hr Intravenous Continuous Marylee Milwaukee, DO 10 mL/hr at 01/07/21 2246 11.54 Units/kg/hr at 01/07/21 2246    0.9 % sodium chloride infusion   Intravenous Continuous ALEXANDER Ortiz CNP        vancomycin (VANCOCIN) 1,500 mg in dextrose 5 % 250 mL IVPB  1,500 mg Intravenous On Call to Miguelangel Cox [place] ALEXANDER Gillis CNP        chlorhexidine (HIBICLENS) 4 % liquid   Topical See Admin Instructions ALEXANDER Ortiz CNP        ceFAZolin (ANCEF) 2 g in dextrose 5 % 100 mL IVPB  2 g Intravenous On Call to ALEXANDER Junior CNP           Allergies:  No Known Allergies    Problem List:    Patient Active Problem List   Diagnosis Code    Chest pain R07.9    Hordeolum externum of right upper eyelid H00.011    Right-sided tinnitus H93.11    Bilateral chronic serous otitis media H65.23    Pulsatile tinnitus of right ear H93. A1    Bilateral high frequency sensorineural hearing loss H90.3    Diverticulosis of large intestine without hemorrhage K57.30    Hyperlipidemia E78.5    Dizziness R42    Abnormal coronary angiogram R93.1       Past Medical History:        Diagnosis Date    Hyperlipidemia 12/21/2020       Past Surgical History:        Procedure Laterality Date    COLONOSCOPY  05/04/2018       Social History:    Social History     Tobacco Use    Smoking status: Never Smoker  Smokeless tobacco: Never Used   Substance Use Topics    Alcohol use: No     Alcohol/week: 0.8 standard drinks     Types: 1 Standard drinks or equivalent per week                                Counseling given: Not Answered      Vital Signs (Current): There were no vitals filed for this visit. BP Readings from Last 3 Encounters:   01/08/21 127/70   01/06/21 122/68   01/05/21 (!) 148/67       NPO Status:                                                                                 BMI:   Wt Readings from Last 3 Encounters:   01/08/21 188 lb 4.4 oz (85.4 kg)   01/06/21 191 lb (86.6 kg)   01/05/21 189 lb (85.7 kg)     There is no height or weight on file to calculate BMI.    CBC:   Lab Results   Component Value Date    WBC 6.7 01/08/2021    RBC 4.17 01/08/2021    HGB 12.5 01/08/2021    HCT 38.2 01/08/2021    MCV 91.7 01/08/2021    RDW 13.7 01/08/2021     01/08/2021       CMP:   Lab Results   Component Value Date     01/08/2021    K 3.6 01/08/2021     01/08/2021    CO2 26 01/08/2021    BUN 14 01/08/2021    CREATININE 0.9 01/08/2021    GFRAA >60 01/08/2021    GFRAA >60 11/23/2011    AGRATIO 1.3 01/07/2021    LABGLOM >60 01/08/2021    GLUCOSE 129 01/08/2021    PROT 6.1 01/07/2021    PROT 7.4 11/23/2011    CALCIUM 9.2 01/08/2021    BILITOT 0.5 01/07/2021    ALKPHOS 92 01/07/2021    AST 21 01/07/2021    ALT 30 01/07/2021       POC Tests: No results for input(s): POCGLU, POCNA, POCK, POCCL, POCBUN, POCHEMO, POCHCT in the last 72 hours.     Coags:   Lab Results   Component Value Date    PROTIME 13.1 01/07/2021    INR 1.13 01/07/2021    APTT 74.7 01/08/2021       HCG (If Applicable): No results found for: PREGTESTUR, PREGSERUM, HCG, HCGQUANT     ABGs:   Lab Results   Component Value Date    PHART 7.350 01/07/2021    PO2ART 80.4 01/07/2021    SUK5LND 46.7 01/07/2021    MYK2MFO 25.7 01/07/2021    BEART -0.3 01/07/2021    N6FWVCPG 96.4 01/07/2021 Type & Screen (If Applicable):  No results found for: LABABO, LABRH    Drug/Infectious Status (If Applicable):  No results found for: HIV, HEPCAB    COVID-19 Screening (If Applicable):   Lab Results   Component Value Date    COVID19 Not Detected 01/07/2021         Anesthesia Evaluation  Patient summary reviewed and Nursing notes reviewed  Airway: Mallampati: II        Dental:          Pulmonary:                              Cardiovascular:  Exercise tolerance: poor (<4 METS),   (+) CAD:, hyperlipidemia                  Neuro/Psych:               GI/Hepatic/Renal:             Endo/Other:                     Abdominal:           Vascular:                                          Anesthesia Plan      general     ASA 4 - emergent       Induction: intravenous. arterial line, central line, CVP, PA catheter and GRETA    Anesthetic plan and risks discussed with patient. Use of blood products discussed with patient whom consented to blood products.                    Keenan Brown MD   1/8/2021

## 2021-01-08 NOTE — ANESTHESIA POSTPROCEDURE EVALUATION
Department of Anesthesiology  Postprocedure Note    Patient: Errol Chin  MRN: [id number]  YOB: 1955  Date of evaluation: 1/8/2021  Time:  1:33 PM     Procedure Summary     Date: 01/08/21 Room / Location: [place] [address] 01 / [facility]    Anesthesia Start: 1612 Anesthesia Stop: 3150    Procedure: URGENT CABG X5, CORONARY ARTERY BYPASS COOK TO LAD, VEIN GRAFT TO RPDA, SEQUENTAIL VEIN GRAFT TO D3, D2 AND PL BRANCH CIRCUMFLEX, LIGATION LEFT ATRIAL APPENDAGE 40MM ATRICLIP, EVH LEFT LEG, ENDOSCOPICALLY VEIN HARVEST OF LEFT SAPHENOUS VEIN, GERTA AORTIC ULTRASOUND, DOPPLER VERIFICATION OF FLOW TO CONFIRM BYPASS GRAFTS, INSERTION OF VENTRICULAR PACING WIRES, BILATERAL INCOSTAL NEVRE BLOCK 5TH LEVEL WITH EXPAREL AND MARCAINE. (N/A ) Diagnosis: (CORONARY ARTERY DISEASE)    Surgeons: Larry Shaffer MD Responsible Provider: Rickie Morgan MD    Anesthesia Type: general ASA Status: 4 - Emergent          Anesthesia Type: general    Girish Phase I:      Girish Phase II:      Last vitals: Reviewed and per EMR flowsheets.        Anesthesia Post Evaluation    Patient location during evaluation: ICU  Patient participation: complete - patient cannot participate  Level of consciousness: sedated and ventilated  Airway patency: patent  Nausea & Vomiting: no nausea and no vomiting  Complications: no  Cardiovascular status: hypertensive  Respiratory status: acceptable  Hydration status: euvolemic

## 2021-01-08 NOTE — CARE COORDINATION
[facility] Case Management Discharge Planning Assessment     RN/SW discharge planner met with patient (and family member) to discuss reason for admission,   current living situation, and potential needs at the time of discharge    Insurance/Demographics Verified:  Medicaid    Current Living Arrangements   [] Apartment [] Condo [] Hotel [] Homeless [x] House [] Shelter   [] AL/IL/SNF           []  LTC     Confirmed w/:   Living w/: [] Alone [x] Children [] Parent [x] Spouse Daphney Dangelo - [phone])        Primary Care Provider / Last visit to PCP   Delmi Trevino [address]   Specialty Providers   [] n/a Romain Nunez (Card.)    Hospital Consults           Level of Functioning / DME   [x] no DME [x] Independent w/ ADLs [] Dependent w/ ADLs   [] BiPAP/CPAP [] BSC [] [place] [] Rollator   [] Hospital Bed [] Home O2   w/   [] Scooter [] Shower Chair [] Sherol Saris [] Wheelchair        Current [employer]   [x] n/a  []  CoA: w/: [] HD [] PD   [] [facility]           [] [facility] Aide  [] Nursing   [] PT  [] OT  [] SW         Medication compliance issues:  none identified    Financial issues that could impact healthcare:  none identified    Discussed and provided facilities of choice if transition to a skilled nursing facility is required at   the time of discharge. Tentative Discharge Planning   [] Acute Rehab [] Home Alone [x] Home w/ Family   [x] HHC                                                                  [] [place]   []  RN [] PT [] OT [] SW   [] Hospice [] LTAC/Select   [] SNF /  [] LTC        Discussed with patient and/or family that on the day of discharge home tentative time of   discharge will be between 10 AM and noon.      Transportation at the time of discharge: family    HELEN Marion, [address] [facility] Case Management  [phone]

## 2021-01-08 NOTE — PROGRESS NOTES
Incentive Spirometry education and demonstration completed by Respiratory Therapy Yes      Response to education: Excellent     Teaching Time: 5 minutes    Minimum Predicted Vital Capacity - 661 mL. Patient's Actual Vital Capacity - 1000 mL. Turning over to Nursing for routine follow-up Yes.     Comments: goal met    Electronically signed by Bernadette Juan RCP on 1/7/2021 at 8:40 PM

## 2021-01-08 NOTE — PLAN OF CARE
Post procedure site check completed. Surgical site is within normal limits and appears to be free of complications.

## 2021-01-08 NOTE — DISCHARGE INSTR - COC
Continuity of Care Form    Patient Name: Porter Reddy   :  1955  MRN:  [id number]    Admit date:  2021  Discharge date:  1/15/21    Code Status Order: Full Code   Advance Directives:   Advance Care Flowsheet Documentation       Date/Time Healthcare Directive Type of Healthcare Directive Copy in [address] Agent's Name Healthcare Agent's Phone Number    21 0254  No, patient does not have an advance directive for healthcare treatment -- -- -- -- --    21 1643  No, patient does not have an advance directive for healthcare treatment -- -- -- -- --            Admitting Physician:  Manisha Shelton DO  PCP: Charly Nelson MD    Discharging Nurse: Rita Durbin RN  [address] Unit/Room#: CVOR/NONE  Discharging Unit Phone Number: [phone]    Emergency Contact:   Extended Emergency Contact Information  Primary Emergency Contact: Berenice Ayala  Address: [address] [place] [address]  Home Phone: [phone]  Relation: Spouse  Secondary Emergency Contact: Zahra Pereira  Mobile Phone: [phone]  Relation: Child    Past Surgical History:  Past Surgical History:   Procedure Laterality Date    COLONOSCOPY  2018       Immunization History:   Immunization History   Administered Date(s) Administered    Tdap (Boostrix, Adacel) 2014       Active Problems:  Patient Active Problem List   Diagnosis Code    Chest pain R07.9    Hordeolum externum of right upper eyelid H00.011    Right-sided tinnitus H93.11    Bilateral chronic serous otitis media H65.23    Pulsatile tinnitus of right ear H93. A1    Bilateral high frequency sensorineural hearing loss H90.3    Diverticulosis of large intestine without hemorrhage K57.30    Hyperlipidemia E78.5    Dizziness R42    Abnormal coronary angiogram R93.1       Isolation/Infection:   Isolation            No Isolation          Patient Infection Status       Infection Onset Added Last Indicated Last Indicated By Review Planned Expiration Resolved Resolved By    None active    Resolved    COVID-19 Rule Out 01/07/21 01/07/21 01/07/21 COVID-19 (Ordered)   01/07/21 Rule-Out Test Resulted            Nurse Assessment:  Last Vital Signs: /70   Pulse 75   Temp 98.5 °F (36.9 °C) (Temporal)   Resp 16   Ht 5' 7\" (1.702 m)   Wt 188 lb 4.4 oz (85.4 kg)   SpO2 96%   BMI 29.49 kg/m²     Last documented pain score (0-10 scale): Pain Level: 0  Last Weight:   Wt Readings from Last 1 Encounters:   01/08/21 188 lb 4.4 oz (85.4 kg)     Mental Status:  oriented, alert, coherent, logical, thought processes intact and able to concentrate and follow conversation    IV Access:  - None    Nursing Mobility/ADLs:  Walking   Independent  Transfer  Independent  Bathing  Independent  Dressing  Independent  [address] Delivery   whole    Wound Care Documentation and Therapy:        Elimination:  Continence:   · Bowel: Yes  · Bladder: Yes  Urinary Catheter: None   Colostomy/Ileostomy/Ileal Conduit: No        Date of Last BM: 1/15/21  No intake or output data in the 24 hours ending 01/08/21 1004  No intake/output data recorded. Safety Concerns:     None    Impairments/Disabilities:      None    Nutrition Therapy:  Current Nutrition Therapy:   - Oral Diet:  Cardiac    Routes of Feeding: Oral  Liquids: Thin Liquids  Daily Fluid Restriction: no  Last Modified Barium Swallow with Video (Video Swallowing Test): not done    Treatments at the Time of Hospital Discharge:   Respiratory Treatments: n/a  Oxygen Therapy:  is not on home oxygen therapy.   Ventilator:    - No ventilator support    Rehab Therapies: Physical Therapy  Weight Bearing Status/Restrictions: No weight bearing restirctions  Other Medical Equipment (for information only, NOT a DME order):  walker  Other Treatments: n/a    Patient's personal belongings (please select all that are sent with patient):  None    RN SIGNATURE:  Electronically signed by Natalie Virk RN on 1/15/21 at 2:43 PM EST    CASE MANAGEMENT/SOCIAL WORK SECTION    Inpatient Status Date: 07 JAN 2021    Readmission Risk Assessment Score:  Readmission Risk              Risk of Unplanned Readmission:        8       Discharging to Facility/ Agency   · Name:  [address] [place] [address]  · Phone:  [phone]  · Fax:  [phone]    / signature: HELEN Michele, .[phone]      PHYSICIAN SECTION    Prognosis: Good    Condition at Discharge: Stable    Rehab Potential (if transferring to Rehab): Good    Recommended Labs or Other Treatments After Discharge: home care with RN, also needs home physical therapy 2-3 sessions per week     Physician Certification: I certify the above information and transfer of Latoya Mota  is necessary for the continuing treatment of the diagnosis listed and that he requires [address] for less 30 days.      Update Admission H&P: No change in H&P    PHYSICIAN SIGNATURE:  Electronically signed by ALEXANDER Lawrence CNP on 1/8/21 at 10:05 AM EST

## 2021-01-08 NOTE — PLAN OF CARE
Nutrition Problem #1: Increased nutrient needs  Intervention: Food and/or Nutrient Delivery: Continue NPO(Advance diet once extubated)  Nutritional Goals: Pt extubated and diet advanced by f/u

## 2021-01-08 NOTE — OP NOTE
Operative Note      Patient: Rodri Mean  YOB: 1955  MRN: [id number]    Date of Procedure: 1/8/2021  Pre-Op Diagnosis:   [place]   Severe 3V CAD   HLD   Anemia       Post-Op Diagnosis:   [place]  Severe 3V CAD  HLD   Anemia       Procedure(s):  URGENT CABG X5 ( LIMA TO MID LAD,   VEIN GRAFT TO RPDA, SEQUENTAIL VEIN GRAFT TO D3, D2 AND PL BRANCH CIRCUMFLEX)   LIGATION LEFT ATRIAL APPENDAGE 40MM ATRICLIP   EVH LEFT LEG  GRETA   TCBP  DOPPLER VERIFICATION OF FLOW TO CONFIRM BYPASS GRAFTS, INSERTION OF VENTRICULAR PACING WIRES,   BILATERAL INCOSTAL NEVRE BLOCK 5TH LEVEL WITH EXPAREL AND MARCAINE.     CBP Time:  145  min         Aortic Cross Clamp Time:  120  min     Surgeon(s):  Hoang York MD     Assistant:  Surgical Assistant: Laura Boo  First Assistant: Paris Yates RN  [facility] and assisted during the case and Maciej Roe assisted with chest opening  Anesthesia: General     Estimated Blood Loss (mL): 705     Complications: None       Specimens:   * No specimens in log *    Implants:  Implant Name Type Inv. Item Serial No.  Lot No. LRB No. Used Action   DEVICE OCCL CLP L40MM SM FOOTPRINT 1 HND APPL SUTURELESS W/  DEVICE OCCL CLP L40MM SM FOOTPRINT 1 HND APPL SUTURELESS W/  [employer]WD [id number] N/A 1 Implanted         Drains:   Chest Tube Anterior 24 Irish (Active)   Suction -20 cm H2O 01/08/21 1440   Chest Tube Airleak Yes 01/08/21 1600   Drainage Description Sanguinous 01/08/21 1600   Dressing Status Clean;Dry; Intact 01/08/21 1600   Dressing Type Dry dressing 01/08/21 1600   Dressing Change Due 01/11/21 01/08/21 1600   Site Assessment Not assessed 01/08/21 1600   Surrounding Skin Dry; Intact 01/08/21 1600   Output (ml) 40 ml 01/08/21 1600       Urethral Catheter Non-latex 16 fr (Active)   Catheter Indications Need for fluid management in critically ill patients in a critical care setting not able to be managed by other means such as BSC with hat, bedpan, urinal, condom catheter, or short term intermittent urethral catherization 01/08/21 1600   Securement Device Date Changed 01/08/21 01/08/21 1440   Site Assessment [place] 01/08/21 1600   Urine Color Yellow 01/08/21 1600   Urine Appearance Clear 01/08/21 1600   Output (mL) 75 mL 01/08/21 1600       Indications for the procedure:  Patient is a 42-year-old -American male who presented with unstable angina. He was found on cardiac cath to have severe three-vessel coronary artery disease. After completion of appropriate preoperative work-up and risk stratification he was found to be a candidate for urgent surgical revascularization with CABG. Findings: There was no pericardial adhesions or effusion, the ascending aorta was free of calcifications. There was no visible myocardial scar to suggest prior MI. The PL extension of the Circ was a 1.75mm target vessel of moderate quality. The D2 was a was a 1.75mm target vessel of moderate quality. The D3 was a 1.75mm target vessel of moderate quality. RPDA was a 1.75mm target vessle of poor to moderate quality. The mid LAD was a 2mm target vessel of moderate quality. The EF was 50% pre and 50% post procedure. The LIMA was of adequate size and quality and had excellent flow and was good to be used for by pass. The vein was of adequate quality and caliber and good to be used for a conduit. There was no flow into the TORREY at the end of the procedure.        Detailed Description of Procedure:   After informed consent was obtained and placement of monitoring lines, the patient was brought to the operating room and was placed on the OR table in supine position. General  endotracheal anesthesia was induced, and a Albright catheter and GRETA probe were inserted. Preoperative antibiotics were administered. The patient was prepped and draped in the usual sterile fashion from chin to toes. A proper time out was performed.  A standard median sternotomy was carried out at the same time with endoscopic vein harvest from the left leg. At the end of the vein harvest, the vein was found to be of adequate caliber and quality to be used for bypass. The leg incisions were closed in layers in standard fashion. The LIMA retractor was inserted, and the LIMA was harvested as a pedicle. At the end of the LIMA harvest, full heparin dose was administered. The LIMA was transected distally and was found to have excellent flow. It was spatulated, wrapped in papaverine soaked sponge, clamped and set aside. Its distal stump was doubly ligated. The LIMA retractor was removed and a standard sternal retractor was inserted. The pericardium was opened in an inverted-T fashion and it was suspended with stay sutures. The aorta was inspected and palpated and was found normal without any calcifications. Standard aortic and right atrial cannulation was carried out in preparation for cardiopulmonary bypass. An antegrade DLP needle was placed on the ascending aorta. With adequate ACT, we commenced cardiopulmonary bypass. We had excellent forward flow and drainage. The systemic temperature was allowed to drift down to 34 degrees centigrade. The coronary artery targets were marked on the epicardium. Aortic cross-clamp was applied, and prompt diastolic arrest was achieved with administration of 1.5 liters of antegrade cardioplegia. Cardiac standstill was maintained throughout the case with topical ice cooling and intermittent doses of  antegrade cardioplegia through the DLP needle and down through the vein grafts in 15-20 minutes intervals. Our attention was then turned to the RPDA which was our first target. This was found to be a  1.75 mm  target vessel of moderate quality. A 7 mm arteriotomy was performed and a spatulated vein graft was anastomosed to  the RPDA with a running 7-0 Prolene suture. Before tying the sutures, all three anastomotic lumens were probed to ensure there was no compromise of the anastomosis.   The heart was allowed to fill up with volume and the vein graft was measured and cut to length in order to reach the ascending aorta. A proximal anastomotic site was created on the ascending aorta with an 11 Blade followed by a 4mm punch. The first proximal aorto saphenous anastomosis to the RPDA was then carried out with a running 6-0 Prolene suture. Of note the graft to the RPDA was brought anterior to the right ventricle over the acute margin of the heart. Our attention was then turned to the lateral wall. The  posterolateral extension of the circumflex was found to be a  1.75 mm target vessel of moderate quality. A 7 mm arteriotomy was performed and the vein graft was anastomosed to the posterior lateral extension of the circumflex with a running 7-0 Prolene suture. Before tying the sutures, all three anastomotic lumens were probed to ensure there was no compromise of the anastomosis. The heart was then filled up with volume and the  diagonal 2 was exposed. This was found to be 1.75mm target vessel of moderate quality. A 7 mm arteriotomy was performed. A lateral venotomy was carried out on the vein graft to the  PL branch of the circumflex in preparation for a side by side crisscross vein graft to the  diagonal 2 anastomosis. This was carried out with a running 7-0 Prolene suture. Before tying the sutures, all three anastomotic lumens were probed to ensure there was no compromise of the anastomosis. The left atrial appendage was then measured and a 40 mm AtriCure clip was applied at the base of the left atrial appendage. Our attention was then turned to the diagonal 3. This was found to be a 1.75 mm target vessel of moderate quality. A lateral venotomy was carried out on the vein graft to the PL branch of the circumflex and the D2 in preparation for a zsen-jc-voor crisscross vein graft to the D3 anastomosis. This was carried out with a running 7-0 Prolene suture.   Before tying the sutures, also anastomotic lumens were probed to ensure there was no compromise with anastomosis. The heart was then allowed to fill up with volume and the sequential vein graft to the  D3, D2, and the PL branch of the circumflex was measured and cut to length in order to reach the ascending aorta. A second proximal anastomotic site was created on the ascending aorta with an 11 blade followed by a 4 mm punch. The second proximal aorto saphenous anastomosis was carried out with a running 6-0 Prolene suture. Systemic rewarming was commenced. Our attention was then turned to the LAD. The mid  LAD was exposed and was found a 2mm target vessel of moderate quality. The LIMA was brought into the field through a generous hole into the left pericardium and the LIMA to LAD distal anastomosis was carried out with a running 8-0 Prolene suture. Before tying all sutures all 3 anastomotic lumens were probed to ensure there was no compromise of anastomosis. Hemostasis was checked by releasing the atraumatic bulldog from the LIMA. The LIMA was tacked on the epicardium with interrupted 6-0 Prolene sutures. A hot shot  antegrade cardioplegia dose was administered, de airing maneuvers were carried out and the cross-clamp was removed. The heart started beating on its own accord in normal sinus rhythm. A bipolar ventricular pacing wire was placed on the diaphragmatic aspect of the right ventricle and the heart but no pacing was required. After adequate time of rewarming and reperfusion, the patient was weaned off cardiopulmonary bypass with inotropic support. There was excellent Doppler flow on all bypass grafts. The heparin was reversed with protamine. All cardiopulmonary bypass circuit volume was returned to the patient and serial decannulation was carried out and all sutures were tied. Thorough hemostasis was achieved at the internal mammary bed, which was also treated with platelet poor gel.   The pericardium was then re approximated with several interrupted sutures. The sternal edges were treated with platelet rich gel and bilateral intercostal parasternal nerve block was carried out with a mixture of Exparel and Marcaine after thorough hemostasis was ensured. A 24 Western Beba Lj drain was placed in the left pleural space and a  24 Western Beba Lj drain was placed in the anterior mediastinum with its tip into the right pleural space which was widely opened and suctioned out. The sternum was then reapproximated with stainless steel wires. The sternotomy incision was then irrigated and closed in between layers in the standard fashion. All needles, instruments and sponge counts were found to be correct.   The patient was brought into the intensive care unit in critical, but stable condition      Electronically signed by Dana Rayo MD on 1/8/2021 at 5:21 PM

## 2021-01-08 NOTE — PROGRESS NOTES
Pt verified information regarding surgery, name, birth date, surgeon, procedure and allergies: NKDA. Patient transferred to [address] [place] [address] for surgery. Appropriate antibiotics ordered: Ancef 2 grams and Vancomycin 1.5 grams. Beta blocker: Esmolol 5 mg given at 0659 per [facility]. DVT prophyaxis: Heparin 11.55 units/kg/hr stopped at 0647 per Dr. My Taylor verbal order at bedside. Lab work within normal limits, 2 units of RBC's on call to OR, vital signs stable, Mepilex sacral border applied and 2% chlorhexidine gluconate skin prep given. Patient and family educated about surgery and pain management. Arterial line placed in Left Brachial and TLC introducer in Right IJ with PA line by Dr. Melva Rice. To surgery per bed at 0700.

## 2021-01-08 NOTE — PROGRESS NOTES
Patient admitted to CVU from [place] [address] and attached to ventilator and monitors. Report received from anesthesiologist.  Chest x-ray ordered. Labs drawn and sent. Assessment complete. Hemodymanics stable and will continue to monitor. Family let back to see patient. Visiting hours reviewed and all questions answered. Family aware of discharge class. Primary RN Dunia Whaley.     [facility]

## 2021-01-09 LAB
ANION GAP SERPL CALCULATED.3IONS-SCNC: 6 MMOL/L (ref 3–16)
BUN BLDV-MCNC: 10 MG/DL (ref 7–20)
CALCIUM SERPL-MCNC: 7.6 MG/DL (ref 8.3–10.6)
CHLORIDE BLD-SCNC: 113 MMOL/L (ref 99–110)
CO2: 21 MMOL/L (ref 21–32)
CREAT SERPL-MCNC: 1 MG/DL (ref 0.8–1.3)
GFR AFRICAN AMERICAN: >60
GFR NON-AFRICAN AMERICAN: >60
GLUCOSE BLD-MCNC: 104 MG/DL (ref 70–99)
GLUCOSE BLD-MCNC: 104 MG/DL (ref 70–99)
GLUCOSE BLD-MCNC: 106 MG/DL (ref 70–99)
GLUCOSE BLD-MCNC: 107 MG/DL (ref 70–99)
GLUCOSE BLD-MCNC: 107 MG/DL (ref 70–99)
GLUCOSE BLD-MCNC: 108 MG/DL (ref 70–99)
GLUCOSE BLD-MCNC: 108 MG/DL (ref 70–99)
GLUCOSE BLD-MCNC: 109 MG/DL (ref 70–99)
GLUCOSE BLD-MCNC: 109 MG/DL (ref 70–99)
GLUCOSE BLD-MCNC: 110 MG/DL (ref 70–99)
GLUCOSE BLD-MCNC: 112 MG/DL (ref 70–99)
GLUCOSE BLD-MCNC: 113 MG/DL (ref 70–99)
GLUCOSE BLD-MCNC: 114 MG/DL (ref 70–99)
GLUCOSE BLD-MCNC: 121 MG/DL (ref 70–99)
GLUCOSE BLD-MCNC: 125 MG/DL (ref 70–99)
GLUCOSE BLD-MCNC: 127 MG/DL (ref 70–99)
GLUCOSE BLD-MCNC: 140 MG/DL (ref 70–99)
GLUCOSE BLD-MCNC: 141 MG/DL (ref 70–99)
GLUCOSE BLD-MCNC: 148 MG/DL (ref 70–99)
GLUCOSE BLD-MCNC: 152 MG/DL (ref 70–99)
GLUCOSE BLD-MCNC: 158 MG/DL (ref 70–99)
GLUCOSE BLD-MCNC: 164 MG/DL (ref 70–99)
GLUCOSE BLD-MCNC: 180 MG/DL (ref 70–99)
HCT VFR BLD CALC: 29.4 % (ref 40.5–52.5)
HEMOGLOBIN: 9.7 G/DL (ref 13.5–17.5)
HEMOGLOBIN: 9.7 GM/DL (ref 13.5–17.5)
MAGNESIUM: 2.4 MG/DL (ref 1.8–2.4)
MCH RBC QN AUTO: 30.6 PG (ref 26–34)
MCHC RBC AUTO-ENTMCNC: 33 G/DL (ref 31–36)
MCV RBC AUTO: 92.7 FL (ref 80–100)
PDW BLD-RTO: 14.1 % (ref 12.4–15.4)
PERFORMED ON: ABNORMAL
PLATELET # BLD: 154 K/UL (ref 135–450)
PMV BLD AUTO: 7.6 FL (ref 5–10.5)
POC HEMATOCRIT: 29 % (ref 40.5–52.5)
POC PATIENT TEMP: 100
POC POTASSIUM: 4.2 MMOL/L (ref 3.5–5.1)
POC SAMPLE TYPE: ABNORMAL
POTASSIUM SERPL-SCNC: 4.1 MMOL/L (ref 3.5–5.1)
POTASSIUM SERPL-SCNC: 4.4 MMOL/L (ref 3.5–5.1)
RBC # BLD: 3.17 M/UL (ref 4.2–5.9)
SODIUM BLD-SCNC: 140 MMOL/L (ref 136–145)
WBC # BLD: 10.9 K/UL (ref 4–11)

## 2021-01-09 PROCEDURE — 6360000002 HC RX W HCPCS: Performed by: THORACIC SURGERY (CARDIOTHORACIC VASCULAR SURGERY)

## 2021-01-09 PROCEDURE — 84132 ASSAY OF SERUM POTASSIUM: CPT

## 2021-01-09 PROCEDURE — 36415 COLL VENOUS BLD VENIPUNCTURE: CPT

## 2021-01-09 PROCEDURE — 80048 BASIC METABOLIC PNL TOTAL CA: CPT

## 2021-01-09 PROCEDURE — 99024 POSTOP FOLLOW-UP VISIT: CPT | Performed by: THORACIC SURGERY (CARDIOTHORACIC VASCULAR SURGERY)

## 2021-01-09 PROCEDURE — 83735 ASSAY OF MAGNESIUM: CPT

## 2021-01-09 PROCEDURE — 94761 N-INVAS EAR/PLS OXIMETRY MLT: CPT

## 2021-01-09 PROCEDURE — 85014 HEMATOCRIT: CPT

## 2021-01-09 PROCEDURE — C9113 INJ PANTOPRAZOLE SODIUM, VIA: HCPCS | Performed by: THORACIC SURGERY (CARDIOTHORACIC VASCULAR SURGERY)

## 2021-01-09 PROCEDURE — 94669 MECHANICAL CHEST WALL OSCILL: CPT

## 2021-01-09 PROCEDURE — 2580000003 HC RX 258: Performed by: THORACIC SURGERY (CARDIOTHORACIC VASCULAR SURGERY)

## 2021-01-09 PROCEDURE — 2140000000 HC CCU INTERMEDIATE R&B

## 2021-01-09 PROCEDURE — 85027 COMPLETE CBC AUTOMATED: CPT

## 2021-01-09 PROCEDURE — 6370000000 HC RX 637 (ALT 250 FOR IP): Performed by: THORACIC SURGERY (CARDIOTHORACIC VASCULAR SURGERY)

## 2021-01-09 PROCEDURE — 94640 AIRWAY INHALATION TREATMENT: CPT

## 2021-01-09 PROCEDURE — 99233 SBSQ HOSP IP/OBS HIGH 50: CPT | Performed by: INTERNAL MEDICINE

## 2021-01-09 PROCEDURE — 2700000000 HC OXYGEN THERAPY PER DAY

## 2021-01-09 PROCEDURE — 2100000000 HC CCU R&B

## 2021-01-09 PROCEDURE — 82947 ASSAY GLUCOSE BLOOD QUANT: CPT

## 2021-01-09 PROCEDURE — 2580000003 HC RX 258: Performed by: INTERNAL MEDICINE

## 2021-01-09 RX ADMIN — SODIUM CHLORIDE 2.45 UNITS/HR: 9 INJECTION, SOLUTION INTRAVENOUS at 07:12

## 2021-01-09 RX ADMIN — LATANOPROST 1 DROP: 50 SOLUTION OPHTHALMIC at 20:45

## 2021-01-09 RX ADMIN — MUPIROCIN: 20 OINTMENT TOPICAL at 20:45

## 2021-01-09 RX ADMIN — Medication 10 ML: at 20:45

## 2021-01-09 RX ADMIN — Medication 10 ML: at 07:55

## 2021-01-09 RX ADMIN — CEFAZOLIN SODIUM 2 G: 10 INJECTION, POWDER, FOR SOLUTION INTRAVENOUS at 20:42

## 2021-01-09 RX ADMIN — METOPROLOL TARTRATE 12.5 MG: 25 TABLET, FILM COATED ORAL at 08:50

## 2021-01-09 RX ADMIN — ACETAMINOPHEN 1000 MG: 500 TABLET, FILM COATED ORAL at 20:41

## 2021-01-09 RX ADMIN — ALBUTEROL SULFATE 2.5 MG: 2.5 SOLUTION RESPIRATORY (INHALATION) at 20:15

## 2021-01-09 RX ADMIN — STANDARDIZED SENNA CONCENTRATE AND DOCUSATE SODIUM 1 TABLET: 8.6; 5 TABLET ORAL at 20:41

## 2021-01-09 RX ADMIN — ACETAMINOPHEN 1000 MG: 500 TABLET, FILM COATED ORAL at 14:32

## 2021-01-09 RX ADMIN — Medication 10 ML: at 20:42

## 2021-01-09 RX ADMIN — Medication 10 ML: at 08:51

## 2021-01-09 RX ADMIN — Medication 10 ML: at 07:56

## 2021-01-09 RX ADMIN — OXYCODONE 5 MG: 5 TABLET ORAL at 16:13

## 2021-01-09 RX ADMIN — ALBUTEROL SULFATE 2.5 MG: 2.5 SOLUTION RESPIRATORY (INHALATION) at 16:54

## 2021-01-09 RX ADMIN — ALBUTEROL SULFATE 2.5 MG: 2.5 SOLUTION RESPIRATORY (INHALATION) at 12:05

## 2021-01-09 RX ADMIN — Medication 400 MG: at 07:49

## 2021-01-09 RX ADMIN — ACETAMINOPHEN 1000 MG: 500 TABLET, FILM COATED ORAL at 02:11

## 2021-01-09 RX ADMIN — CEFAZOLIN SODIUM 2 G: 10 INJECTION, POWDER, FOR SOLUTION INTRAVENOUS at 14:31

## 2021-01-09 RX ADMIN — ALBUTEROL SULFATE 2.5 MG: 2.5 SOLUTION RESPIRATORY (INHALATION) at 07:58

## 2021-01-09 RX ADMIN — FUROSEMIDE 20 MG: 10 INJECTION, SOLUTION INTRAMUSCULAR; INTRAVENOUS at 07:54

## 2021-01-09 RX ADMIN — FUROSEMIDE 20 MG: 10 INJECTION, SOLUTION INTRAMUSCULAR; INTRAVENOUS at 17:03

## 2021-01-09 RX ADMIN — OXYCODONE 5 MG: 5 TABLET ORAL at 11:44

## 2021-01-09 RX ADMIN — FONDAPARINUX SODIUM 2.5 MG: 2.5 INJECTION, SOLUTION SUBCUTANEOUS at 07:54

## 2021-01-09 RX ADMIN — OXYCODONE 5 MG: 5 TABLET ORAL at 09:32

## 2021-01-09 RX ADMIN — ACETAMINOPHEN 1000 MG: 500 TABLET, FILM COATED ORAL at 07:48

## 2021-01-09 RX ADMIN — POTASSIUM CHLORIDE 10 MEQ: 750 TABLET, FILM COATED, EXTENDED RELEASE ORAL at 07:49

## 2021-01-09 RX ADMIN — OXYCODONE 5 MG: 5 TABLET ORAL at 22:13

## 2021-01-09 RX ADMIN — POTASSIUM CHLORIDE 10 MEQ: 750 TABLET, FILM COATED, EXTENDED RELEASE ORAL at 15:36

## 2021-01-09 RX ADMIN — OXYCODONE 10 MG: 5 TABLET ORAL at 00:14

## 2021-01-09 RX ADMIN — POTASSIUM CHLORIDE 10 MEQ: 750 TABLET, FILM COATED, EXTENDED RELEASE ORAL at 17:03

## 2021-01-09 RX ADMIN — ATORVASTATIN CALCIUM 40 MG: 40 TABLET, FILM COATED ORAL at 20:42

## 2021-01-09 RX ADMIN — MUPIROCIN: 20 OINTMENT TOPICAL at 08:14

## 2021-01-09 RX ADMIN — ASPIRIN 325 MG: 325 TABLET, COATED ORAL at 07:49

## 2021-01-09 RX ADMIN — Medication 400 MG: at 20:42

## 2021-01-09 RX ADMIN — VANCOMYCIN HYDROCHLORIDE 1500 MG: 10 INJECTION, POWDER, LYOPHILIZED, FOR SOLUTION INTRAVENOUS at 07:41

## 2021-01-09 RX ADMIN — ONDANSETRON 4 MG: 2 INJECTION INTRAMUSCULAR; INTRAVENOUS at 17:31

## 2021-01-09 RX ADMIN — METOPROLOL TARTRATE 12.5 MG: 25 TABLET, FILM COATED ORAL at 20:42

## 2021-01-09 RX ADMIN — STANDARDIZED SENNA CONCENTRATE AND DOCUSATE SODIUM 1 TABLET: 8.6; 5 TABLET ORAL at 07:49

## 2021-01-09 RX ADMIN — ONDANSETRON 4 MG: 2 INJECTION INTRAMUSCULAR; INTRAVENOUS at 11:40

## 2021-01-09 RX ADMIN — PANTOPRAZOLE SODIUM 40 MG: 40 INJECTION, POWDER, FOR SOLUTION INTRAVENOUS at 07:54

## 2021-01-09 RX ADMIN — VANCOMYCIN HYDROCHLORIDE 1500 MG: 10 INJECTION, POWDER, LYOPHILIZED, FOR SOLUTION INTRAVENOUS at 18:00

## 2021-01-09 RX ADMIN — METOPROLOL TARTRATE 12.5 MG: 25 TABLET, FILM COATED ORAL at 09:26

## 2021-01-09 RX ADMIN — OXYCODONE 5 MG: 5 TABLET ORAL at 18:30

## 2021-01-09 RX ADMIN — CEFAZOLIN SODIUM 2 G: 10 INJECTION, POWDER, FOR SOLUTION INTRAVENOUS at 06:45

## 2021-01-09 ASSESSMENT — PAIN SCALES - GENERAL
PAINLEVEL_OUTOF10: 7
PAINLEVEL_OUTOF10: 3
PAINLEVEL_OUTOF10: 7
PAINLEVEL_OUTOF10: 5
PAINLEVEL_OUTOF10: 5
PAINLEVEL_OUTOF10: 2
PAINLEVEL_OUTOF10: 7
PAINLEVEL_OUTOF10: 2
PAINLEVEL_OUTOF10: 1

## 2021-01-09 ASSESSMENT — PAIN DESCRIPTION - PAIN TYPE
TYPE: SURGICAL PAIN
TYPE: SURGICAL PAIN

## 2021-01-09 ASSESSMENT — PAIN DESCRIPTION - LOCATION
LOCATION: CHEST

## 2021-01-09 NOTE — PROGRESS NOTES
01/08/21 2110   Incentive Spirometry Tx   Treatment Tolerance Fair   Incentive Spirometry Goal (mL) 661 mL   Incentive Spirometry Achieved (mL) 200 mL

## 2021-01-09 NOTE — PROGRESS NOTES
[facility] [place]  Daily Progress Note    Cardio: Manuel Kovacs   Admit: 1/7/2021      CC: CAD, HTN, CHOL  Subj: Today, s/p CABG. No acute issues overnight.       Obj:  /70   Pulse 96   Temp 100 °F (37.8 °C) (Temporal)   Resp 11   Ht 5' 7\" (1.702 m)   Wt 196 lb 6.9 oz (89.1 kg)   SpO2 97%   BMI 30.77 kg/m²       Intake/Output Summary (Last 24 hours) at 1/9/2021 0709  Last data filed at 1/9/2021 0600  Gross per 24 hour   Intake 1202.2 ml   Output 2335 ml   Net -1132.8 ml     Gen Alert, coop, no distress Heart Rrr, no mrg   Head NC, AT, no abnorm Abd Soft, NT, ND, +BS, no mass, no OM   Eyes PERRLA, conj/corn clear Ext Ext nl, AT, no c/c/e   Nose Nares nl, no drain, NT Pulse 2+ symm ra, dp, pt   Throat Lips, mucosa, tongue nl Skin Color/tect/turg nl, no rash/lesion   Neck S/S, TM, NT, no bruit/JVD Psych Nl mood and affect   Lung decr bases, no rales Lymph No cervical or ax LA   Ch Wall NT, no deform Neuro Nl gross M/S exam     Medications:    sodium chloride flush  10 mL Intravenous 2 times per day    fondaparinux  2.5 mg Subcutaneous Daily    aspirin  325 mg Oral Daily    acetaminophen  1,000 mg Oral Q6H    furosemide  20 mg Intravenous BID    magnesium oxide  400 mg Oral BID    mupirocin   Nasal BID    potassium chloride  10 mEq Oral TID WC    sennosides-docusate sodium  1 tablet Oral BID    metoprolol tartrate  12.5 mg Oral BID    [START ON 1/12/2021] lisinopril  2.5 mg Oral Lunch    atorvastatin  40 mg Oral Nightly    pantoprazole  40 mg Oral Daily    pantoprazole  40 mg Intravenous Daily    And    sodium chloride (PF)  10 mL Intravenous Daily    ceFAZolin (ANCEF) IVPB  2 g Intravenous Q8H    vancomycin (VANCOCIN) IV  1,500 mg Intravenous Q12H    albuterol  2.5 mg Nebulization Q4H WA    insulin glargine  0.15 Units/kg Subcutaneous Nightly    insulin lispro  0-12 Units Subcutaneous 4x Daily AC & HS    sodium chloride flush  10 mL Intravenous 2 times per day    metoprolol tartrate  25 mg Oral BID    latanoprost  1 drop Both Eyes Nightly      sodium chloride 50 mL/hr at 01/08/21 1411    sodium chloride      milrinone      norepinephrine Stopped (01/09/21 0100)    nitroprusside (NIPRIDE) 50 mg in D5W infusion      clevidipine      insulin 2.45 Units/hr (01/09/21 0657)    dextrose      heparin (PORCINE) Infusion 11.54 Units/kg/hr (01/07/21 2246)    sodium chloride 50 mL/hr at 01/08/21 0555     Lab Data: Relevant and available CV data reviewed    Plan:  *CAD  Status S/p CABG, progressing well  Plan Postop protocols per CTS  *HTN  Status controlled  Plan Continue current meds  *CHOL  Status On high-intensity statin  Plan Continue high-intensity statin      Time:  More than 35 minutes spent reviewing patient chart (including but not limited to notes, labs, imaging and other testing), interviewing patient and/or family members, performing a physical exam, documentation of my findings above and subsequent follow-up of ordered testing. More than 50% of that time spent face to face with patient discussing clinical condition and counseling regarding treatment plan.

## 2021-01-09 NOTE — PROGRESS NOTES
Progress Note    POD#1  URGENT CABG X5 ( LIMA TO MID LAD,   VEIN GRAFT TO RPDA, SEQUENTAIL VEIN GRAFT TO D3, D2 AND PL BRANCH CIRCUMFLEX)   LIGATION LEFT ATRIAL APPENDAGE 40MM ATRICLIP   EVH LEFT LEG  GRETA   TCBP  DOPPLER VERIFICATION OF FLOW TO CONFIRM BYPASS GRAFTS, INSERTION OF VENTRICULAR PACING WIRES,   BILATERAL INCOSTAL NEVRE BLOCK 5TH LEVEL WITH EXPAREL AND MARCAINE. Vital Signs:                                                 /70   Pulse 99   Temp 97.5 °F (36.4 °C) (Temporal)   Resp 11   Ht 5' 7\" (1.702 m)   Wt 196 lb 6.9 oz (89.1 kg)   SpO2 92%   BMI 30.77 kg/m²  O2 Flow Rate (L/min): 1 L/min     CVP (Mean): 6 mmHg  PAP: 31/12  PAP (Mean): 19 mmHg  SVR (Using ABP Mean): 814.55 dyne*sec/cm5  CCI: 2.8 L/min  Admission Weight: 191 lb (86.6 kg)      Vent Settings:  n/a    Drips:  n/a    I/O:      Intake/Output Summary (Last 24 hours) at 1/9/2021 1131  Last data filed at 1/9/2021 1045  Gross per 24 hour   Intake 1202.2 ml   Output 2840 ml   Net -1637.8 ml     Chest Tube:     O/E  GEN: nad  HEENT: central access  CV: reg, wound c/d/i  Pulm: decreased  Abd: soft, nt, nd, no peritoneal signs  Ext: warm, edema, wound c/d/i    Data Review:  CBC:   Recent Labs     01/08/21  0430 01/08/21  0430 01/08/21  1347 01/08/21  1945 01/09/21  0105 01/09/21  0415   WBC 6.7  --  15.0*  --   --  10.9   HGB 12.5*   < > 10.9* 10.3* 9.7* 9.7*   HCT 38.2*  --  33.5*  --   --  29.4*   MCV 91.7  --  92.5  --   --  92.7     --  177  --   --  154    < > = values in this interval not displayed. BMP:   Recent Labs     01/08/21  0430 01/08/21  1347 01/09/21  0415    139 140   K 3.6 3.6 4.4    112* 113*   CO2 26 22 21   BUN 14 10 10   CREATININE 0.9 0.9 1.0   CALCIUM 9.2 8.0* 7.6*   MG  --  3.00* 2.40     Cardiac Enzymes: No results for input(s): CKTOTAL, CKMB, CKMBINDEX, TROPONINI in the last 72 hours.   PT/INR:   Recent Labs     01/07/21  1420 01/08/21  1347   PROTIME 13.1 13.1   INR 1.13 1. 13     APTT:   Recent Labs     01/08/21  0430 01/08/21  1347   APTT 74.7* 27.4       Assessment/Plan:  Clinically stable  Lying in bed no issues  Rainer marks removed  D/c arterial line  D/c PCWs  Keep CTs  Ambulate  Anticipate early discharge     Chantel Lacey MD  1/9/2021  11:31 AM

## 2021-01-09 NOTE — PROGRESS NOTES
Dr. Marianne Osgood stated SWAN could be removed if Levo off for over 2 hrs, but to leave [place] till CVTS rounds. Procedures explained to patient. Right IJ Montse Jaycee discontinued and obturator inserted. Patient tolerated well and minimal ectopy on monitor. Erika remains in place per order. VSS, will continue to monitor.

## 2021-01-10 LAB
ANION GAP SERPL CALCULATED.3IONS-SCNC: 10 MMOL/L (ref 3–16)
BUN BLDV-MCNC: 14 MG/DL (ref 7–20)
CALCIUM SERPL-MCNC: 8.2 MG/DL (ref 8.3–10.6)
CHLORIDE BLD-SCNC: 103 MMOL/L (ref 99–110)
CO2: 21 MMOL/L (ref 21–32)
CREAT SERPL-MCNC: 1.1 MG/DL (ref 0.8–1.3)
GFR AFRICAN AMERICAN: >60
GFR NON-AFRICAN AMERICAN: >60
GLUCOSE BLD-MCNC: 103 MG/DL (ref 70–99)
GLUCOSE BLD-MCNC: 109 MG/DL (ref 70–99)
GLUCOSE BLD-MCNC: 133 MG/DL (ref 70–99)
GLUCOSE BLD-MCNC: 135 MG/DL (ref 70–99)
GLUCOSE BLD-MCNC: 169 MG/DL (ref 70–99)
GLUCOSE BLD-MCNC: 170 MG/DL (ref 70–99)
GLUCOSE BLD-MCNC: 190 MG/DL (ref 70–99)
GLUCOSE BLD-MCNC: 75 MG/DL (ref 70–99)
GLUCOSE BLD-MCNC: 79 MG/DL (ref 70–99)
GLUCOSE BLD-MCNC: 90 MG/DL (ref 70–99)
GLUCOSE BLD-MCNC: 90 MG/DL (ref 70–99)
GLUCOSE BLD-MCNC: 91 MG/DL (ref 70–99)
HCT VFR BLD CALC: 29 % (ref 40.5–52.5)
HEMOGLOBIN: 9.2 G/DL (ref 13.5–17.5)
MAGNESIUM: 2.4 MG/DL (ref 1.8–2.4)
MCH RBC QN AUTO: 29.7 PG (ref 26–34)
MCHC RBC AUTO-ENTMCNC: 31.7 G/DL (ref 31–36)
MCV RBC AUTO: 93.8 FL (ref 80–100)
PDW BLD-RTO: 14.4 % (ref 12.4–15.4)
PERFORMED ON: ABNORMAL
PERFORMED ON: NORMAL
PLATELET # BLD: 165 K/UL (ref 135–450)
PMV BLD AUTO: 9 FL (ref 5–10.5)
POTASSIUM SERPL-SCNC: 4.2 MMOL/L (ref 3.5–5.1)
RBC # BLD: 3.09 M/UL (ref 4.2–5.9)
SODIUM BLD-SCNC: 134 MMOL/L (ref 136–145)
WBC # BLD: 15.7 K/UL (ref 4–11)

## 2021-01-10 PROCEDURE — 6370000000 HC RX 637 (ALT 250 FOR IP): Performed by: INTERNAL MEDICINE

## 2021-01-10 PROCEDURE — 80048 BASIC METABOLIC PNL TOTAL CA: CPT

## 2021-01-10 PROCEDURE — 6370000000 HC RX 637 (ALT 250 FOR IP): Performed by: THORACIC SURGERY (CARDIOTHORACIC VASCULAR SURGERY)

## 2021-01-10 PROCEDURE — 2580000003 HC RX 258: Performed by: INTERNAL MEDICINE

## 2021-01-10 PROCEDURE — 94640 AIRWAY INHALATION TREATMENT: CPT

## 2021-01-10 PROCEDURE — 83735 ASSAY OF MAGNESIUM: CPT

## 2021-01-10 PROCEDURE — 2100000000 HC CCU R&B

## 2021-01-10 PROCEDURE — 85027 COMPLETE CBC AUTOMATED: CPT

## 2021-01-10 PROCEDURE — 6360000002 HC RX W HCPCS: Performed by: THORACIC SURGERY (CARDIOTHORACIC VASCULAR SURGERY)

## 2021-01-10 PROCEDURE — 2140000000 HC CCU INTERMEDIATE R&B

## 2021-01-10 PROCEDURE — 94669 MECHANICAL CHEST WALL OSCILL: CPT

## 2021-01-10 PROCEDURE — 99024 POSTOP FOLLOW-UP VISIT: CPT | Performed by: THORACIC SURGERY (CARDIOTHORACIC VASCULAR SURGERY)

## 2021-01-10 PROCEDURE — 99233 SBSQ HOSP IP/OBS HIGH 50: CPT | Performed by: INTERNAL MEDICINE

## 2021-01-10 PROCEDURE — 94761 N-INVAS EAR/PLS OXIMETRY MLT: CPT

## 2021-01-10 PROCEDURE — C9113 INJ PANTOPRAZOLE SODIUM, VIA: HCPCS | Performed by: THORACIC SURGERY (CARDIOTHORACIC VASCULAR SURGERY)

## 2021-01-10 PROCEDURE — 2700000000 HC OXYGEN THERAPY PER DAY

## 2021-01-10 PROCEDURE — 2580000003 HC RX 258: Performed by: THORACIC SURGERY (CARDIOTHORACIC VASCULAR SURGERY)

## 2021-01-10 RX ORDER — AMIODARONE HYDROCHLORIDE 200 MG/1
400 TABLET ORAL 2 TIMES DAILY
Status: COMPLETED | OUTPATIENT
Start: 2021-01-10 | End: 2021-01-13

## 2021-01-10 RX ADMIN — Medication 10 ML: at 21:11

## 2021-01-10 RX ADMIN — ALBUTEROL SULFATE 2.5 MG: 2.5 SOLUTION RESPIRATORY (INHALATION) at 15:29

## 2021-01-10 RX ADMIN — INSULIN LISPRO 2 UNITS: 100 INJECTION, SOLUTION INTRAVENOUS; SUBCUTANEOUS at 21:11

## 2021-01-10 RX ADMIN — POTASSIUM CHLORIDE 10 MEQ: 750 TABLET, FILM COATED, EXTENDED RELEASE ORAL at 07:58

## 2021-01-10 RX ADMIN — AMIODARONE HYDROCHLORIDE 150 MG: 50 INJECTION, SOLUTION INTRAVENOUS at 20:47

## 2021-01-10 RX ADMIN — ACETAMINOPHEN 1000 MG: 500 TABLET, FILM COATED ORAL at 02:02

## 2021-01-10 RX ADMIN — MUPIROCIN: 20 OINTMENT TOPICAL at 09:10

## 2021-01-10 RX ADMIN — INSULIN LISPRO 2 UNITS: 100 INJECTION, SOLUTION INTRAVENOUS; SUBCUTANEOUS at 11:34

## 2021-01-10 RX ADMIN — STANDARDIZED SENNA CONCENTRATE AND DOCUSATE SODIUM 1 TABLET: 8.6; 5 TABLET ORAL at 20:56

## 2021-01-10 RX ADMIN — METOPROLOL TARTRATE 25 MG: 25 TABLET, FILM COATED ORAL at 20:56

## 2021-01-10 RX ADMIN — Medication 10 ML: at 20:47

## 2021-01-10 RX ADMIN — STANDARDIZED SENNA CONCENTRATE AND DOCUSATE SODIUM 1 TABLET: 8.6; 5 TABLET ORAL at 07:58

## 2021-01-10 RX ADMIN — ALBUTEROL SULFATE 2.5 MG: 2.5 SOLUTION RESPIRATORY (INHALATION) at 08:11

## 2021-01-10 RX ADMIN — MUPIROCIN: 20 OINTMENT TOPICAL at 21:01

## 2021-01-10 RX ADMIN — OXYCODONE 5 MG: 5 TABLET ORAL at 18:21

## 2021-01-10 RX ADMIN — LATANOPROST 1 DROP: 50 SOLUTION OPHTHALMIC at 21:01

## 2021-01-10 RX ADMIN — ACETAMINOPHEN 1000 MG: 500 TABLET, FILM COATED ORAL at 13:49

## 2021-01-10 RX ADMIN — PANTOPRAZOLE SODIUM 40 MG: 40 INJECTION, POWDER, FOR SOLUTION INTRAVENOUS at 07:58

## 2021-01-10 RX ADMIN — ALBUTEROL SULFATE 2.5 MG: 2.5 SOLUTION RESPIRATORY (INHALATION) at 19:11

## 2021-01-10 RX ADMIN — OXYCODONE 5 MG: 5 TABLET ORAL at 11:11

## 2021-01-10 RX ADMIN — Medication 10 ML: at 07:59

## 2021-01-10 RX ADMIN — INSULIN LISPRO 2 UNITS: 100 INJECTION, SOLUTION INTRAVENOUS; SUBCUTANEOUS at 17:28

## 2021-01-10 RX ADMIN — METOPROLOL TARTRATE 12.5 MG: 25 TABLET, FILM COATED ORAL at 07:58

## 2021-01-10 RX ADMIN — ACETAMINOPHEN 1000 MG: 500 TABLET, FILM COATED ORAL at 20:56

## 2021-01-10 RX ADMIN — ATORVASTATIN CALCIUM 40 MG: 40 TABLET, FILM COATED ORAL at 20:56

## 2021-01-10 RX ADMIN — Medication 2 PUFF: at 03:52

## 2021-01-10 RX ADMIN — OXYCODONE 10 MG: 5 TABLET ORAL at 23:02

## 2021-01-10 RX ADMIN — POTASSIUM CHLORIDE 10 MEQ: 750 TABLET, FILM COATED, EXTENDED RELEASE ORAL at 13:50

## 2021-01-10 RX ADMIN — ACETAMINOPHEN 1000 MG: 500 TABLET, FILM COATED ORAL at 06:05

## 2021-01-10 RX ADMIN — Medication 10 ML: at 09:12

## 2021-01-10 RX ADMIN — AMIODARONE HYDROCHLORIDE 400 MG: 200 TABLET ORAL at 20:56

## 2021-01-10 RX ADMIN — METOPROLOL TARTRATE 25 MG: 25 TABLET, FILM COATED ORAL at 13:50

## 2021-01-10 RX ADMIN — Medication 400 MG: at 07:58

## 2021-01-10 RX ADMIN — OXYCODONE 5 MG: 5 TABLET ORAL at 02:01

## 2021-01-10 RX ADMIN — ASPIRIN 325 MG: 325 TABLET, COATED ORAL at 07:58

## 2021-01-10 RX ADMIN — AMIODARONE HYDROCHLORIDE 1 MG/MIN: 50 INJECTION, SOLUTION INTRAVENOUS at 21:10

## 2021-01-10 RX ADMIN — ALBUTEROL SULFATE 2.5 MG: 2.5 SOLUTION RESPIRATORY (INHALATION) at 12:23

## 2021-01-10 RX ADMIN — FUROSEMIDE 20 MG: 10 INJECTION, SOLUTION INTRAMUSCULAR; INTRAVENOUS at 08:14

## 2021-01-10 RX ADMIN — Medication 1 SPRAY: at 00:45

## 2021-01-10 RX ADMIN — Medication 400 MG: at 20:56

## 2021-01-10 RX ADMIN — OXYCODONE 5 MG: 5 TABLET ORAL at 06:05

## 2021-01-10 RX ADMIN — FUROSEMIDE 20 MG: 10 INJECTION, SOLUTION INTRAMUSCULAR; INTRAVENOUS at 18:17

## 2021-01-10 RX ADMIN — POTASSIUM CHLORIDE 10 MEQ: 750 TABLET, FILM COATED, EXTENDED RELEASE ORAL at 17:27

## 2021-01-10 RX ADMIN — FONDAPARINUX SODIUM 2.5 MG: 2.5 INJECTION, SOLUTION SUBCUTANEOUS at 11:12

## 2021-01-10 ASSESSMENT — PAIN DESCRIPTION - PAIN TYPE
TYPE: SURGICAL PAIN
TYPE: SURGICAL PAIN

## 2021-01-10 ASSESSMENT — PAIN DESCRIPTION - LOCATION
LOCATION: CHEST;STERNUM
LOCATION: CHEST
LOCATION: CHEST

## 2021-01-10 ASSESSMENT — PAIN SCALES - GENERAL
PAINLEVEL_OUTOF10: 5
PAINLEVEL_OUTOF10: 8
PAINLEVEL_OUTOF10: 6
PAINLEVEL_OUTOF10: 8
PAINLEVEL_OUTOF10: 8

## 2021-01-10 NOTE — PROGRESS NOTES
Progress Note    POD#2  URGENT CABG X5 ( LIMA TO MID LAD,   VEIN GRAFT TO RPDA, SEQUENTAIL VEIN GRAFT TO D3, D2 AND PL BRANCH CIRCUMFLEX)   LIGATION LEFT ATRIAL APPENDAGE 40MM ATRICLIP     Vital Signs:                                                 /72   Pulse 113   Temp 97.8 °F (36.6 °C) (Temporal)   Resp 22   Ht 5' 7\" (1.702 m)   Wt 189 lb 6 oz (85.9 kg)   SpO2 94%   BMI 29.66 kg/m²  O2 Flow Rate (L/min): 4 L/min     CVP (Mean): 6 mmHg  PAP: 31/12  PAP (Mean): 19 mmHg  SVR (Using ABP Mean): 814.55 dyne*sec/cm5  CCI: 2.8 L/min  Admission Weight: 191 lb (86.6 kg)      Vent Settings:  n/a    Drips:  n/a    I/O:      Intake/Output Summary (Last 24 hours) at 1/10/2021 1024  Last data filed at 1/10/2021 1000  Gross per 24 hour   Intake 2181 ml   Output 2470 ml   Net -289 ml     Chest Tube:     O/E  GEN: nad  HEENT: unremarkable  CV: reg, wound c/d/i  Pulm: decreased  Abd: soft, nt, nd, no peritoneal signs  Ext: warm, edema, wound c/d/i    Data Review:  CBC:   Recent Labs     01/08/21  1347 01/08/21  1347 01/09/21  0105 01/09/21  0415 01/10/21  0330   WBC 15.0*  --   --  10.9 15.7*   HGB 10.9*   < > 9.7* 9.7* 9.2*   HCT 33.5*  --   --  29.4* 29.0*   MCV 92.5  --   --  92.7 93.8     --   --  154 165    < > = values in this interval not displayed. BMP:   Recent Labs     01/08/21  1347 01/09/21  0415 01/09/21  1445 01/10/21  0330    140  --  134*   K 3.6 4.4 4.1 4.2   * 113*  --  103   CO2 22 21  --  21   BUN 10 10  --  14   CREATININE 0.9 1.0  --  1.1   CALCIUM 8.0* 7.6*  --  8.2*   MG 3.00* 2.40  --  2.40     Cardiac Enzymes: No results for input(s): CKTOTAL, CKMB, CKMBINDEX, TROPONINI in the last 72 hours. PT/INR:   Recent Labs     01/07/21  1420 01/08/21  1347   PROTIME 13.1 13.1   INR 1.13 1.13     APTT:   Recent Labs     01/08/21  0430 01/08/21  1347   APTT 74.7* 27.4       Assessment/Plan:  Up in chair. No acute issues.  HDS  Mild Sinus tachycardia on EKG  Continue 25 mg metoprolol   Keep chest tubes, high output    Kailee Alexis MD  1/10/2021  10:24 AM

## 2021-01-10 NOTE — PROGRESS NOTES
[facility]  Daily Progress Note    Cardio: Nilam Galvez   Admit: 1/7/2021      CC: CAD, HTN, CHOL  Subj: Today, s/p CABG. No acute issues overnight. Doing well. Mild sternal pain. ST with PACs.       Obj:  /72   Pulse 113   Temp 97.8 °F (36.6 °C) (Temporal)   Resp 21   Ht 5' 7\" (1.702 m)   Wt 189 lb 6 oz (85.9 kg)   SpO2 94%   BMI 29.66 kg/m²       Intake/Output Summary (Last 24 hours) at 1/10/2021 0804  Last data filed at 1/10/2021 4872  Gross per 24 hour   Intake 1881 ml   Output 2495 ml   Net -614 ml     Gen Alert, coop, no distress Heart Rrr, no mrg   Head NC, AT, no abnorm Abd Soft, NT, ND, +BS, no mass, no OM   Eyes PERRLA, conj/corn clear Ext Ext nl, AT, no c/c/e   Nose Nares nl, no drain, NT Pulse 2+ symm ra, dp, pt   Throat Lips, mucosa, tongue nl Skin Color/tect/turg nl, no rash/lesion   Neck S/S, TM, NT, no bruit/JVD Psych Nl mood and affect   Lung decr bases, no rales Lymph No cervical or ax LA   Ch Wall NT, no deform Neuro Nl gross M/S exam     Medications:    sodium chloride flush  10 mL Intravenous 2 times per day    fondaparinux  2.5 mg Subcutaneous Daily    aspirin  325 mg Oral Daily    acetaminophen  1,000 mg Oral Q6H    furosemide  20 mg Intravenous BID    magnesium oxide  400 mg Oral BID    mupirocin   Nasal BID    potassium chloride  10 mEq Oral TID WC    sennosides-docusate sodium  1 tablet Oral BID    metoprolol tartrate  12.5 mg Oral BID    [START ON 1/12/2021] lisinopril  2.5 mg Oral Lunch    atorvastatin  40 mg Oral Nightly    pantoprazole  40 mg Oral Daily    pantoprazole  40 mg Intravenous Daily    And    sodium chloride (PF)  10 mL Intravenous Daily    albuterol  2.5 mg Nebulization Q4H WA    insulin glargine  0.15 Units/kg Subcutaneous Nightly    insulin lispro  0-12 Units Subcutaneous 4x Daily AC & HS    sodium chloride flush  10 mL Intravenous 2 times per day    metoprolol tartrate  25 mg Oral BID    latanoprost  1 drop Both Eyes Nightly      sodium chloride 50 mL/hr at 01/08/21 1411    sodium chloride      milrinone      norepinephrine Stopped (01/09/21 0100)    nitroprusside (NIPRIDE) 50 mg in D5W infusion      clevidipine      insulin Stopped (01/10/21 0507)    dextrose      heparin (PORCINE) Infusion 11.54 Units/kg/hr (01/07/21 2246)    sodium chloride 50 mL/hr at 01/08/21 0555     Lab Data: Relevant and available CV data reviewed    Plan:  *CAD  Status S/p CABG, progressing well, tele ST with PACs watch for afib  Plan Postop protocols per CTS, continue BB, asa, statin  *HTN  Status controlled  Plan Continue current meds  *CHOL  Status On high-intensity statin  Plan Continue high-intensity statin      Time:  More than 35 minutes spent reviewing patient chart (including but not limited to notes, labs, imaging and other testing), interviewing patient and/or family members, performing a physical exam, documentation of my findings above and subsequent follow-up of ordered testing. More than 50% of that time spent face to face with patient discussing clinical condition and counseling regarding treatment plan.

## 2021-01-11 ENCOUNTER — APPOINTMENT (OUTPATIENT)
Dept: GENERAL RADIOLOGY | Age: 66
DRG: 166 | End: 2021-01-11
Attending: INTERNAL MEDICINE
Payer: MEDICAID

## 2021-01-11 LAB
ANION GAP SERPL CALCULATED.3IONS-SCNC: 8 MMOL/L (ref 3–16)
BUN BLDV-MCNC: 18 MG/DL (ref 7–20)
CALCIUM SERPL-MCNC: 8.2 MG/DL (ref 8.3–10.6)
CHLORIDE BLD-SCNC: 102 MMOL/L (ref 99–110)
CO2: 24 MMOL/L (ref 21–32)
CREAT SERPL-MCNC: 0.9 MG/DL (ref 0.8–1.3)
GFR AFRICAN AMERICAN: >60
GFR NON-AFRICAN AMERICAN: >60
GLUCOSE BLD-MCNC: 127 MG/DL (ref 70–99)
GLUCOSE BLD-MCNC: 166 MG/DL (ref 70–99)
GLUCOSE BLD-MCNC: 179 MG/DL (ref 70–99)
MAGNESIUM: 2.4 MG/DL (ref 1.8–2.4)
PERFORMED ON: ABNORMAL
PERFORMED ON: ABNORMAL
POTASSIUM SERPL-SCNC: 4.6 MMOL/L (ref 3.5–5.1)
SODIUM BLD-SCNC: 134 MMOL/L (ref 136–145)

## 2021-01-11 PROCEDURE — 94761 N-INVAS EAR/PLS OXIMETRY MLT: CPT

## 2021-01-11 PROCEDURE — 2140000000 HC CCU INTERMEDIATE R&B

## 2021-01-11 PROCEDURE — 2700000000 HC OXYGEN THERAPY PER DAY

## 2021-01-11 PROCEDURE — 94640 AIRWAY INHALATION TREATMENT: CPT

## 2021-01-11 PROCEDURE — 2580000003 HC RX 258: Performed by: NURSE PRACTITIONER

## 2021-01-11 PROCEDURE — 99233 SBSQ HOSP IP/OBS HIGH 50: CPT | Performed by: INTERNAL MEDICINE

## 2021-01-11 PROCEDURE — 83735 ASSAY OF MAGNESIUM: CPT

## 2021-01-11 PROCEDURE — 94669 MECHANICAL CHEST WALL OSCILL: CPT

## 2021-01-11 PROCEDURE — 71045 X-RAY EXAM CHEST 1 VIEW: CPT

## 2021-01-11 PROCEDURE — 6370000000 HC RX 637 (ALT 250 FOR IP): Performed by: INTERNAL MEDICINE

## 2021-01-11 PROCEDURE — 2100000000 HC CCU R&B

## 2021-01-11 PROCEDURE — 6370000000 HC RX 637 (ALT 250 FOR IP): Performed by: THORACIC SURGERY (CARDIOTHORACIC VASCULAR SURGERY)

## 2021-01-11 PROCEDURE — 6370000000 HC RX 637 (ALT 250 FOR IP): Performed by: NURSE PRACTITIONER

## 2021-01-11 PROCEDURE — 80048 BASIC METABOLIC PNL TOTAL CA: CPT

## 2021-01-11 PROCEDURE — 6360000002 HC RX W HCPCS: Performed by: NURSE PRACTITIONER

## 2021-01-11 PROCEDURE — 36592 COLLECT BLOOD FROM PICC: CPT

## 2021-01-11 PROCEDURE — 6360000002 HC RX W HCPCS: Performed by: THORACIC SURGERY (CARDIOTHORACIC VASCULAR SURGERY)

## 2021-01-11 PROCEDURE — 2580000003 HC RX 258: Performed by: THORACIC SURGERY (CARDIOTHORACIC VASCULAR SURGERY)

## 2021-01-11 PROCEDURE — 93005 ELECTROCARDIOGRAM TRACING: CPT | Performed by: INTERNAL MEDICINE

## 2021-01-11 PROCEDURE — APPSS15 APP SPLIT SHARED TIME 0-15 MINUTES: Performed by: NURSE PRACTITIONER

## 2021-01-11 PROCEDURE — APPNB30 APP NON BILLABLE TIME 0-30 MINS: Performed by: NURSE PRACTITIONER

## 2021-01-11 RX ORDER — ALPRAZOLAM 0.25 MG/1
0.25 TABLET ORAL ONCE
Status: COMPLETED | OUTPATIENT
Start: 2021-01-11 | End: 2021-01-11

## 2021-01-11 RX ORDER — FUROSEMIDE 10 MG/ML
20 INJECTION INTRAMUSCULAR; INTRAVENOUS ONCE
Status: COMPLETED | OUTPATIENT
Start: 2021-01-11 | End: 2021-01-11

## 2021-01-11 RX ORDER — FUROSEMIDE 10 MG/ML
20 INJECTION INTRAMUSCULAR; INTRAVENOUS 2 TIMES DAILY
Status: DISCONTINUED | OUTPATIENT
Start: 2021-01-11 | End: 2021-01-12

## 2021-01-11 RX ORDER — FUROSEMIDE 10 MG/ML
20 INJECTION INTRAMUSCULAR; INTRAVENOUS 3 TIMES DAILY
Status: DISCONTINUED | OUTPATIENT
Start: 2021-01-11 | End: 2021-01-11

## 2021-01-11 RX ORDER — GABAPENTIN 100 MG/1
100 CAPSULE ORAL 3 TIMES DAILY
Status: DISCONTINUED | OUTPATIENT
Start: 2021-01-11 | End: 2021-01-15 | Stop reason: HOSPADM

## 2021-01-11 RX ADMIN — PANTOPRAZOLE SODIUM 40 MG: 40 TABLET, DELAYED RELEASE ORAL at 08:37

## 2021-01-11 RX ADMIN — ASPIRIN 325 MG: 325 TABLET, COATED ORAL at 08:37

## 2021-01-11 RX ADMIN — MUPIROCIN: 20 OINTMENT TOPICAL at 21:02

## 2021-01-11 RX ADMIN — FONDAPARINUX SODIUM 2.5 MG: 2.5 INJECTION, SOLUTION SUBCUTANEOUS at 08:37

## 2021-01-11 RX ADMIN — Medication 2 PUFF: at 00:04

## 2021-01-11 RX ADMIN — ACETAMINOPHEN 1000 MG: 500 TABLET, FILM COATED ORAL at 19:48

## 2021-01-11 RX ADMIN — OXYCODONE 10 MG: 5 TABLET ORAL at 19:49

## 2021-01-11 RX ADMIN — AMIODARONE HYDROCHLORIDE 0.5 MG/MIN: 50 INJECTION, SOLUTION INTRAVENOUS at 02:42

## 2021-01-11 RX ADMIN — POTASSIUM CHLORIDE 10 MEQ: 750 TABLET, FILM COATED, EXTENDED RELEASE ORAL at 08:40

## 2021-01-11 RX ADMIN — ACETAMINOPHEN 1000 MG: 500 TABLET, FILM COATED ORAL at 02:44

## 2021-01-11 RX ADMIN — POTASSIUM CHLORIDE 10 MEQ: 750 TABLET, FILM COATED, EXTENDED RELEASE ORAL at 15:12

## 2021-01-11 RX ADMIN — OXYCODONE 10 MG: 5 TABLET ORAL at 15:12

## 2021-01-11 RX ADMIN — Medication 400 MG: at 21:02

## 2021-01-11 RX ADMIN — Medication 10 ML: at 21:23

## 2021-01-11 RX ADMIN — AMIODARONE HYDROCHLORIDE 400 MG: 200 TABLET ORAL at 08:37

## 2021-01-11 RX ADMIN — ACETAMINOPHEN 1000 MG: 500 TABLET, FILM COATED ORAL at 15:12

## 2021-01-11 RX ADMIN — FUROSEMIDE 20 MG: 10 INJECTION, SOLUTION INTRAMUSCULAR; INTRAVENOUS at 19:50

## 2021-01-11 RX ADMIN — ALBUTEROL SULFATE 2.5 MG: 2.5 SOLUTION RESPIRATORY (INHALATION) at 08:54

## 2021-01-11 RX ADMIN — FUROSEMIDE 20 MG: 10 INJECTION, SOLUTION INTRAMUSCULAR; INTRAVENOUS at 16:26

## 2021-01-11 RX ADMIN — GABAPENTIN 100 MG: 100 CAPSULE ORAL at 16:26

## 2021-01-11 RX ADMIN — Medication 10 ML: at 08:38

## 2021-01-11 RX ADMIN — ALPRAZOLAM 0.25 MG: 0.25 TABLET ORAL at 21:07

## 2021-01-11 RX ADMIN — ALBUTEROL SULFATE 2.5 MG: 2.5 SOLUTION RESPIRATORY (INHALATION) at 19:50

## 2021-01-11 RX ADMIN — METOPROLOL TARTRATE 25 MG: 25 TABLET, FILM COATED ORAL at 21:03

## 2021-01-11 RX ADMIN — AMIODARONE HYDROCHLORIDE 75 MG: 50 INJECTION, SOLUTION INTRAVENOUS at 08:50

## 2021-01-11 RX ADMIN — ATORVASTATIN CALCIUM 40 MG: 40 TABLET, FILM COATED ORAL at 21:03

## 2021-01-11 RX ADMIN — Medication 400 MG: at 08:40

## 2021-01-11 RX ADMIN — MUPIROCIN: 20 OINTMENT TOPICAL at 08:38

## 2021-01-11 RX ADMIN — STANDARDIZED SENNA CONCENTRATE AND DOCUSATE SODIUM 1 TABLET: 8.6; 5 TABLET ORAL at 21:01

## 2021-01-11 RX ADMIN — AMIODARONE HYDROCHLORIDE 400 MG: 200 TABLET ORAL at 21:02

## 2021-01-11 RX ADMIN — LATANOPROST 1 DROP: 50 SOLUTION OPHTHALMIC at 21:01

## 2021-01-11 RX ADMIN — AMIODARONE HYDROCHLORIDE 0.5 MG/MIN: 50 INJECTION, SOLUTION INTRAVENOUS at 09:54

## 2021-01-11 RX ADMIN — ACETAMINOPHEN 1000 MG: 500 TABLET, FILM COATED ORAL at 08:37

## 2021-01-11 RX ADMIN — METOPROLOL TARTRATE 25 MG: 25 TABLET, FILM COATED ORAL at 08:36

## 2021-01-11 RX ADMIN — POTASSIUM CHLORIDE 10 MEQ: 750 TABLET, FILM COATED, EXTENDED RELEASE ORAL at 21:15

## 2021-01-11 RX ADMIN — OXYCODONE 10 MG: 5 TABLET ORAL at 04:24

## 2021-01-11 RX ADMIN — ALBUTEROL SULFATE 2.5 MG: 2.5 SOLUTION RESPIRATORY (INHALATION) at 15:30

## 2021-01-11 RX ADMIN — GABAPENTIN 100 MG: 100 CAPSULE ORAL at 21:01

## 2021-01-11 RX ADMIN — STANDARDIZED SENNA CONCENTRATE AND DOCUSATE SODIUM 1 TABLET: 8.6; 5 TABLET ORAL at 08:37

## 2021-01-11 RX ADMIN — FUROSEMIDE 20 MG: 10 INJECTION, SOLUTION INTRAMUSCULAR; INTRAVENOUS at 08:37

## 2021-01-11 RX ADMIN — INSULIN LISPRO 2 UNITS: 100 INJECTION, SOLUTION INTRAVENOUS; SUBCUTANEOUS at 21:14

## 2021-01-11 ASSESSMENT — PAIN - FUNCTIONAL ASSESSMENT: PAIN_FUNCTIONAL_ASSESSMENT: PREVENTS OR INTERFERES SOME ACTIVE ACTIVITIES AND ADLS

## 2021-01-11 ASSESSMENT — PAIN SCALES - GENERAL
PAINLEVEL_OUTOF10: 0
PAINLEVEL_OUTOF10: 8
PAINLEVEL_OUTOF10: 4
PAINLEVEL_OUTOF10: 3
PAINLEVEL_OUTOF10: 4
PAINLEVEL_OUTOF10: 0
PAINLEVEL_OUTOF10: 8
PAINLEVEL_OUTOF10: 8
PAINLEVEL_OUTOF10: 4
PAINLEVEL_OUTOF10: 4

## 2021-01-11 ASSESSMENT — PAIN DESCRIPTION - DESCRIPTORS: DESCRIPTORS: ACHING;DISCOMFORT

## 2021-01-11 NOTE — PROGRESS NOTES
Pt ambulated in [place] using 4 wheel walker. Pt ambulated approx 50' and tolerated fair. Pt c/o SOB with ambulation, pt assisted back to room and sitting up in chair.

## 2021-01-11 NOTE — PROGRESS NOTES
01/11/21 1530   Treatment   Treatment Type IS   $Treatment Type $Inhaled Therapy/Meds   Medications Albuterol   Pre-Tx Pulse 95   Pre-Tx Resps 18   Breath Sounds Pre-Tx JACOBO Diminished; Expiratory Wheezes   Breath Sounds Pre-Tx LLL Diminished   Breath Sounds Pre-Tx RUL Diminished; Expiratory Wheezes   Breath Sounds Pre-Tx RML Diminished   Breath Sounds Pre-Tx RLL Diminished   Breath Sounds Post-Tx JACOBO Diminished; Expiratory Wheezes   Breath Sounds Post-Tx LLL Diminished   Breath Sounds Post-Tx RUL Diminished; Expiratory Wheezes   Breath Sounds Post-Tx RML Diminished   Breath Sounds Post-Tx RLL Diminished   Post-Tx Pulse 95   Position Semi-Ochoa's   Tx Tolerance Well   Duration 10   Is patient on O2?  Verna Ch

## 2021-01-11 NOTE — PROGRESS NOTES
Pt remains in A-Fib, amio gtt infusing as reported. 75 mg Amiodarone bolus given per order. Pt SOB at rest, on 6L NC. Expiratory wheezes and crackles heard throughout lung fields.

## 2021-01-11 NOTE — PROGRESS NOTES
Cardiothoracic Surgery Progress Note    CC: CABG X 5, TORREY clip  POD# 3    Subjective:  Hemodynamically stable, oxygen requirements increased to 6 liters n/c, afebrile. Alert and oriented X 3. Weight down this am. Patient complaining of moderate incisional pain. WT:82.7 kg/85.9 kg   pre-op 85.4 kg    Vital Signs:   Vitals:    01/11/21 0857   BP: 122/55   Pulse: 109   Resp: 22   Temp: 97.1   SpO2: 97      Gtts: amiodarone at 0.5 mg/hour    Physical Exam:   Cardiac:  Atrial fibrillation   Lungs: clear to auscultation, decreased bases bilaterally  Abdomen:  no BM  Vascular:  pulses all palpable   Extremities: generalized, non-pitting edema 1+  :  UOP 1050 last 24 hours  Chest Tube(s) & Incision(s):    Sternum:  Edges approximated, no drainage  Chest tube site: C/D/I     Left leg incision:  Edges well approximated, no drainage, left upper leg hematoma    Labs:   CBC:   Recent Labs     01/09/21  0415 01/10/21  0330   WBC 10.9 15.7*   HGB 9.7* 9.2*   HCT 29.4* 29.0*    165     BMP:   Recent Labs     01/10/21  0330 01/11/21  0430   K 4.2 4.6   CREATININE 1.1 0.9   CALCIUM 8.2* 8.2*   MG 2.40 2.40     Assessment/Plan:  As per CC:     Plan:   Aggressive IS and acapella  Amiodarone 75mg bolus IV over 60 minutes  May increase beta blocker later if BP ok after amio bolus   Stat CXR  Diuresis - strict I/O, fluid restriction   Wean oxygen  Laxative of choice   Inadequate pain control, will add Neurontin, most of his pain is at the chest tube site.  Will d/c CT when criteria met  Ambulate    Disp:  Plan for home with home care       Electronically signed by ALEXANDER Ventura CNP on 1/11/2021 at 10:19 AM

## 2021-01-11 NOTE — PROGRESS NOTES
[facility] Daily Progress Note      Admit Date:  1/7/2021    No chief complaint on file. SOB    Subjective:  Mr. Rochaw Cramp of tightness in his chest at the chest tube insertion sites. Complains of left leg tenderness in thigh. Afib overnight, put on amiodarone gtt.     Objective:   BP (!) 122/55   Pulse 109   Temp 97.1 °F (36.2 °C) (Temporal)   Resp 22   Ht 5' 7\" (1.702 m)   Wt 182 lb 5.1 oz (82.7 kg)   SpO2 (!) 77%   BMI 28.56 kg/m²       Intake/Output Summary (Last 24 hours) at 1/11/2021 1032  Last data filed at 1/11/2021 0954  Gross per 24 hour   Intake --   Output 1435 ml   Net -1435 ml       TELEMETRY: Sinus     Physical Exam:  General:  Awake, alert, oriented x 3, NAD  Skin:  Warm and dry  Neck:  JVD flat  Chest:  normal air entry  Cardiovascular:  RRR S1S2, no S3, no mrmr  Abdomen:  Soft, ND, NT, No HSM  Extremities:  No edema    Medications:    amiodarone  400 mg Oral BID    sodium chloride flush  10 mL Intravenous 2 times per day    fondaparinux  2.5 mg Subcutaneous Daily    aspirin  325 mg Oral Daily    acetaminophen  1,000 mg Oral Q6H    furosemide  20 mg Intravenous BID    magnesium oxide  400 mg Oral BID    mupirocin   Nasal BID    potassium chloride  10 mEq Oral TID WC    sennosides-docusate sodium  1 tablet Oral BID    [START ON 1/12/2021] lisinopril  2.5 mg Oral Lunch    atorvastatin  40 mg Oral Nightly    pantoprazole  40 mg Oral Daily    pantoprazole  40 mg Intravenous Daily    And    sodium chloride (PF)  10 mL Intravenous Daily    albuterol  2.5 mg Nebulization Q4H WA    insulin lispro  0-12 Units Subcutaneous 4x Daily AC & HS    sodium chloride flush  10 mL Intravenous 2 times per day    metoprolol tartrate  25 mg Oral BID    latanoprost  1 drop Both Eyes Nightly      amiodarone 0.5 mg/min (01/11/21 0954)    sodium chloride 50 mL/hr at 01/08/21 1411    sodium chloride      milrinone      norepinephrine Stopped (01/09/21 0100)    nitroprusside amiodarone.       Zehra Rosen MD 1/11/2021 10:32 AM

## 2021-01-11 NOTE — PROCEDURES
Pulmonary Function Testing      Patient name:  Hazel Cantu     [address] [place] [address] Unit #:   [id number]   Date of test: 1/7/2021  Date of interpretation:   1/11/2021    Mr. Hazel Cantu is a 72y.o. year-old non smoker. The spirometry data was unacceptable but was reproducible. Spirometry:  Flow volume loops were normal. The FEV-1/FVC ratio was normal. The   FEV-1 was 2.63 liters (99% of predicted), which was normal. The FVC was 3.39 liters (97% of predicted), which was normal. Response to inhaled bronchodilators (albuterol) was not performed. Lung volumes:  Lung volumes were not tested by plethysmography. Diffusion capacity was found to be not tested. Interpretation:  Poor effort pattern. Available data is suggestive of normal pulmonary function.

## 2021-01-12 ENCOUNTER — APPOINTMENT (OUTPATIENT)
Dept: GENERAL RADIOLOGY | Age: 66
DRG: 166 | End: 2021-01-12
Attending: INTERNAL MEDICINE
Payer: MEDICAID

## 2021-01-12 LAB
EKG ATRIAL RATE: 101 BPM
EKG DIAGNOSIS: NORMAL
EKG P AXIS: 36 DEGREES
EKG P-R INTERVAL: 134 MS
EKG Q-T INTERVAL: 366 MS
EKG QRS DURATION: 80 MS
EKG QTC CALCULATION (BAZETT): 474 MS
EKG R AXIS: -9 DEGREES
EKG T AXIS: 12 DEGREES
EKG VENTRICULAR RATE: 101 BPM
GLUCOSE BLD-MCNC: 115 MG/DL (ref 70–99)
GLUCOSE BLD-MCNC: 158 MG/DL (ref 70–99)
GLUCOSE BLD-MCNC: 197 MG/DL (ref 70–99)
GLUCOSE BLD-MCNC: 222 MG/DL (ref 70–99)
MAGNESIUM: 2.6 MG/DL (ref 1.8–2.4)
PERFORMED ON: ABNORMAL

## 2021-01-12 PROCEDURE — 71045 X-RAY EXAM CHEST 1 VIEW: CPT

## 2021-01-12 PROCEDURE — 97162 PT EVAL MOD COMPLEX 30 MIN: CPT

## 2021-01-12 PROCEDURE — 97116 GAIT TRAINING THERAPY: CPT

## 2021-01-12 PROCEDURE — 36592 COLLECT BLOOD FROM PICC: CPT

## 2021-01-12 PROCEDURE — 97530 THERAPEUTIC ACTIVITIES: CPT

## 2021-01-12 PROCEDURE — APPNB30 APP NON BILLABLE TIME 0-30 MINS: Performed by: NURSE PRACTITIONER

## 2021-01-12 PROCEDURE — 2140000000 HC CCU INTERMEDIATE R&B

## 2021-01-12 PROCEDURE — 6360000002 HC RX W HCPCS: Performed by: THORACIC SURGERY (CARDIOTHORACIC VASCULAR SURGERY)

## 2021-01-12 PROCEDURE — 6370000000 HC RX 637 (ALT 250 FOR IP): Performed by: NURSE PRACTITIONER

## 2021-01-12 PROCEDURE — 2580000003 HC RX 258: Performed by: THORACIC SURGERY (CARDIOTHORACIC VASCULAR SURGERY)

## 2021-01-12 PROCEDURE — 6360000002 HC RX W HCPCS: Performed by: NURSE PRACTITIONER

## 2021-01-12 PROCEDURE — 99233 SBSQ HOSP IP/OBS HIGH 50: CPT | Performed by: NURSE PRACTITIONER

## 2021-01-12 PROCEDURE — 83735 ASSAY OF MAGNESIUM: CPT

## 2021-01-12 PROCEDURE — APPSS15 APP SPLIT SHARED TIME 0-15 MINUTES: Performed by: NURSE PRACTITIONER

## 2021-01-12 PROCEDURE — 94640 AIRWAY INHALATION TREATMENT: CPT

## 2021-01-12 PROCEDURE — 93010 ELECTROCARDIOGRAM REPORT: CPT | Performed by: INTERNAL MEDICINE

## 2021-01-12 PROCEDURE — 6370000000 HC RX 637 (ALT 250 FOR IP): Performed by: INTERNAL MEDICINE

## 2021-01-12 PROCEDURE — 6370000000 HC RX 637 (ALT 250 FOR IP): Performed by: THORACIC SURGERY (CARDIOTHORACIC VASCULAR SURGERY)

## 2021-01-12 PROCEDURE — 2580000003 HC RX 258: Performed by: NURSE PRACTITIONER

## 2021-01-12 PROCEDURE — 97166 OT EVAL MOD COMPLEX 45 MIN: CPT

## 2021-01-12 RX ORDER — FUROSEMIDE 10 MG/ML
20 INJECTION INTRAMUSCULAR; INTRAVENOUS 3 TIMES DAILY
Status: DISCONTINUED | OUTPATIENT
Start: 2021-01-12 | End: 2021-01-15 | Stop reason: HOSPADM

## 2021-01-12 RX ORDER — LEVALBUTEROL INHALATION SOLUTION 1.25 MG/3ML
1.25 SOLUTION RESPIRATORY (INHALATION)
Status: DISCONTINUED | OUTPATIENT
Start: 2021-01-12 | End: 2021-01-15 | Stop reason: HOSPADM

## 2021-01-12 RX ADMIN — Medication 10 ML: at 10:43

## 2021-01-12 RX ADMIN — METOPROLOL TARTRATE 25 MG: 25 TABLET, FILM COATED ORAL at 08:23

## 2021-01-12 RX ADMIN — GABAPENTIN 100 MG: 100 CAPSULE ORAL at 20:46

## 2021-01-12 RX ADMIN — LATANOPROST 1 DROP: 50 SOLUTION OPHTHALMIC at 20:45

## 2021-01-12 RX ADMIN — LISINOPRIL 2.5 MG: 5 TABLET ORAL at 13:07

## 2021-01-12 RX ADMIN — MUPIROCIN: 20 OINTMENT TOPICAL at 10:34

## 2021-01-12 RX ADMIN — FUROSEMIDE 20 MG: 10 INJECTION, SOLUTION INTRAMUSCULAR; INTRAVENOUS at 20:46

## 2021-01-12 RX ADMIN — LEVALBUTEROL HYDROCHLORIDE 1.25 MG: 1.25 SOLUTION RESPIRATORY (INHALATION) at 20:29

## 2021-01-12 RX ADMIN — HYDROCORTISONE SODIUM SUCCINATE 100 MG: 100 INJECTION, POWDER, FOR SOLUTION INTRAMUSCULAR; INTRAVENOUS at 16:42

## 2021-01-12 RX ADMIN — GABAPENTIN 100 MG: 100 CAPSULE ORAL at 14:52

## 2021-01-12 RX ADMIN — ASPIRIN 325 MG: 325 TABLET, COATED ORAL at 08:23

## 2021-01-12 RX ADMIN — AMIODARONE HYDROCHLORIDE 150 MG: 50 INJECTION, SOLUTION INTRAVENOUS at 10:33

## 2021-01-12 RX ADMIN — AMIODARONE HYDROCHLORIDE 400 MG: 200 TABLET ORAL at 20:46

## 2021-01-12 RX ADMIN — Medication 10 ML: at 20:48

## 2021-01-12 RX ADMIN — FUROSEMIDE 20 MG: 10 INJECTION, SOLUTION INTRAMUSCULAR; INTRAVENOUS at 16:34

## 2021-01-12 RX ADMIN — AMIODARONE HYDROCHLORIDE 75 MG: 50 INJECTION, SOLUTION INTRAVENOUS at 21:50

## 2021-01-12 RX ADMIN — POTASSIUM CHLORIDE 10 MEQ: 750 TABLET, FILM COATED, EXTENDED RELEASE ORAL at 16:42

## 2021-01-12 RX ADMIN — FONDAPARINUX SODIUM 2.5 MG: 2.5 INJECTION, SOLUTION SUBCUTANEOUS at 08:23

## 2021-01-12 RX ADMIN — MAGNESIUM HYDROXIDE 30 ML: 400 SUSPENSION ORAL at 16:34

## 2021-01-12 RX ADMIN — MUPIROCIN: 20 OINTMENT TOPICAL at 20:46

## 2021-01-12 RX ADMIN — FUROSEMIDE 20 MG: 10 INJECTION, SOLUTION INTRAMUSCULAR; INTRAVENOUS at 10:35

## 2021-01-12 RX ADMIN — ACETAMINOPHEN 1000 MG: 500 TABLET, FILM COATED ORAL at 14:52

## 2021-01-12 RX ADMIN — POTASSIUM CHLORIDE 10 MEQ: 750 TABLET, FILM COATED, EXTENDED RELEASE ORAL at 13:06

## 2021-01-12 RX ADMIN — AMIODARONE HYDROCHLORIDE 75 MG: 50 INJECTION, SOLUTION INTRAVENOUS at 23:06

## 2021-01-12 RX ADMIN — INSULIN LISPRO 2 UNITS: 100 INJECTION, SOLUTION INTRAVENOUS; SUBCUTANEOUS at 13:07

## 2021-01-12 RX ADMIN — Medication 400 MG: at 20:46

## 2021-01-12 RX ADMIN — OXYCODONE 5 MG: 5 TABLET ORAL at 18:57

## 2021-01-12 RX ADMIN — ACETAMINOPHEN 1000 MG: 500 TABLET, FILM COATED ORAL at 20:46

## 2021-01-12 RX ADMIN — Medication 400 MG: at 08:23

## 2021-01-12 RX ADMIN — METOPROLOL TARTRATE 25 MG: 25 TABLET, FILM COATED ORAL at 20:46

## 2021-01-12 RX ADMIN — ACETAMINOPHEN 1000 MG: 500 TABLET, FILM COATED ORAL at 08:24

## 2021-01-12 RX ADMIN — AMIODARONE HYDROCHLORIDE 400 MG: 200 TABLET ORAL at 08:23

## 2021-01-12 RX ADMIN — STANDARDIZED SENNA CONCENTRATE AND DOCUSATE SODIUM 1 TABLET: 8.6; 5 TABLET ORAL at 20:46

## 2021-01-12 RX ADMIN — PANTOPRAZOLE SODIUM 40 MG: 40 TABLET, DELAYED RELEASE ORAL at 08:24

## 2021-01-12 RX ADMIN — INSULIN LISPRO 4 UNITS: 100 INJECTION, SOLUTION INTRAVENOUS; SUBCUTANEOUS at 20:49

## 2021-01-12 RX ADMIN — STANDARDIZED SENNA CONCENTRATE AND DOCUSATE SODIUM 1 TABLET: 8.6; 5 TABLET ORAL at 08:23

## 2021-01-12 RX ADMIN — GABAPENTIN 100 MG: 100 CAPSULE ORAL at 08:24

## 2021-01-12 RX ADMIN — ATORVASTATIN CALCIUM 40 MG: 40 TABLET, FILM COATED ORAL at 20:46

## 2021-01-12 RX ADMIN — INSULIN LISPRO 2 UNITS: 100 INJECTION, SOLUTION INTRAVENOUS; SUBCUTANEOUS at 16:35

## 2021-01-12 RX ADMIN — POTASSIUM CHLORIDE 10 MEQ: 750 TABLET, FILM COATED, EXTENDED RELEASE ORAL at 08:24

## 2021-01-12 RX ADMIN — HYDROCORTISONE SODIUM SUCCINATE 100 MG: 100 INJECTION, POWDER, FOR SOLUTION INTRAMUSCULAR; INTRAVENOUS at 10:34

## 2021-01-12 RX ADMIN — ACETAMINOPHEN 1000 MG: 500 TABLET, FILM COATED ORAL at 05:05

## 2021-01-12 ASSESSMENT — PAIN SCALES - GENERAL
PAINLEVEL_OUTOF10: 5

## 2021-01-12 ASSESSMENT — PAIN DESCRIPTION - LOCATION: LOCATION: STERNUM

## 2021-01-12 NOTE — PROGRESS NOTES
Cardiothoracic Surgery Progress Note    CC: CABG X 5, TORREY clip  POD# 4     Subjective:  Patient in atrial fibrillation with RVR. Hemodynamically stable, oxygen requirements decreased to 3 liters n/c, afebrile. Alert and oriented X 3.  Weight slightly up this am. Patient complaining of moderate incisional pain.      WT:83 kg/82.7 kg   pre-op 85.4 kg    Vital Signs:   Vitals:    01/12/21 0455   BP: 110/69   Pulse: 102   Resp: 20   Temp: 98 °F (36.7 °C)   SpO2: 93%      Physical Exam:   Cardiac:  Atrial fibrillation, RVR  Lungs: clear to auscultation, decreased bases bilaterally  Abdomen:  no BM  Vascular:  pulses all palpable   Extremities: generalized, non-pitting edema 1+  :  UOP 1475  last 24 hours  Chest Tube(s) & Incision(s):    Chest Tube: 120/90/120  no airleak, no crepitus, to waterseal  Sternum:  Edges approximated, no drainage  Chest tube site: C/D/I     Left leg incision:  Edges well approximated, no drainage, left upper leg hematoma firm and tender to touch     Labs:   CBC:   Recent Labs     01/10/21  0330   WBC 15.7*   HGB 9.2*   HCT 29.0*        BMP:   Recent Labs     01/10/21  0330 01/11/21  0430 01/12/21  0506   K 4.2 4.6  --    CREATININE 1.1 0.9  --    CALCIUM 8.2* 8.2*  --    MG 2.40 2.40 2.60*       Assessment/Plan:  As per CC:     Plan:   Solucortef 100 mg IV every 8 hours X 6 doses, then start taper  Amiodarone 150 mg bolus over 60 minutes  Warm compress to left leg hematoma BID   Aggressive IS and acapella  Diuresis - strict I/O, fluid restriction - lasix 20 mg IV TID today   Wean oxygen  Laxative of choice   Ambulate X 4 today     Disp:  Plan for home with home care       Electronically signed by ALEXANDER Catalan CNP on 1/12/2021 at 10:06 AM

## 2021-01-12 NOTE — PROGRESS NOTES
01/1955   Incentive Spirometry Tx   Treatment Tolerance Fair   Incentive Spirometry Goal (mL) 660 mL   Incentive Spirometry Achieved (mL) 400 mL

## 2021-01-12 NOTE — PROGRESS NOTES
Chest tubes meet criteria to remove per open heart protocol. No  air leak. no crepitus. Pt instructed on procedure. Site cleansed and prepped per protocol. Chest tubes X 2 removed without difficulty. Dry sterile dressing applied. Bilateral breath sounds audible. O2 Sats 94% on 2 L NC.  no  wires secured to dressing. Incision site within normal limits. Patient tolerated well.

## 2021-01-12 NOTE — PROGRESS NOTES
[address]   Cardiology Progress Note     Date: 1/12/2021  Admit Date: 1/7/2021     Reason for consultation: chest pain    Chief Complaint: No chief complaint on file. History of Present Illness: History obtained from patient and medical record. Fang Gomez is a 72 y.o. male who presented with complaints of intermittent chest pain. He has a history of HLD. He underwent LHC 1/7 revealing MVD. Interval Hx: Today, he is doing ok. He complaints of pain today in his surgical sites. No c/o SOB in the chair. On 3L of oxygen today. Trying to walk and use IS. No other complaints. Patient seen and examined. Clinical notes reviewed. Telemetry reviewed. No new complaints today. No major events overnight. Denies having chest pain, palpitations, shortness of breath, orthopnea/PND, cough, or dizziness at the time of this visit. Past Medical History:  Past Medical History:   Diagnosis Date    Coronary artery disease involving native heart     Hyperlipidemia 12/21/2020        Past Surgical History:    has a past surgical history that includes Colonoscopy (05/04/2018) and Coronary artery bypass graft (N/A, 1/8/2021). Social History:  Reviewed. reports that he has never smoked. He has never used smokeless tobacco. He reports that he does not drink alcohol or use drugs. Allergies:  No Known Allergies    Family History:  Reviewed. family history includes Diabetes in his mother; Heart Disease in his paternal uncle; High Blood Pressure in his father and mother. Denies family history of sudden cardiac death, arrhythmia, premature CAD    Home Meds:  Prior to Visit Medications    Medication Sig Taking?  Authorizing Provider   meloxicam (MOBIC) 15 MG tablet Take 1 tablet by mouth daily  Buffy Mcintosh MD   latanoprost (XALATAN) 0.005 % ophthalmic solution   Historical Provider, MD        Scheduled Meds:   gabapentin  100 mg Oral TID    furosemide  20 mg Intravenous BID    amiodarone 400 mg Oral BID    sodium chloride flush  10 mL Intravenous 2 times per day    fondaparinux  2.5 mg Subcutaneous Daily    aspirin  325 mg Oral Daily    acetaminophen  1,000 mg Oral Q6H    magnesium oxide  400 mg Oral BID    mupirocin   Nasal BID    potassium chloride  10 mEq Oral TID WC    sennosides-docusate sodium  1 tablet Oral BID    lisinopril  2.5 mg Oral Lunch    atorvastatin  40 mg Oral Nightly    pantoprazole  40 mg Oral Daily    albuterol  2.5 mg Nebulization Q4H WA    insulin lispro  0-12 Units Subcutaneous 4x Daily AC & HS    metoprolol tartrate  25 mg Oral BID    latanoprost  1 drop Both Eyes Nightly     Continuous Infusions:   amiodarone Stopped (01/11/21 2125)     PRN Meds:phenol, sodium chloride flush, ondansetron, acetaminophen, oxyCODONE **OR** oxyCODONE, fentanNYL, hydrALAZINE, metoprolol, diphenhydrAMINE, magnesium hydroxide, potassium chloride, magnesium sulfate, calcium chloride IVPB, calcium chloride IVPB, albuterol sulfate HFA     Review of Systems:  · Constitutional: Negative for fever, night sweats, chills, weight changes  · Skin: Negative for rash, pruritus, bleeding, blood clots, or bruising    · HEENT: Negative for vision changes, ringing in the ears, dysphagia, or swollen lymph nodes  · Respiratory: Reviewed in HPI  · Cardiovascular: Reviewed in HPI  · Gastrointestinal: Negative for abdominal pain, N/V/D, constipation, or black/tarry stools  · Genito-Urinary: Negative for dysuria, incontinence, or hematuria  · Musculoskeletal: Negative for joint swelling, muscle pain, or injuries  · Neurological/Psych: Negative for confusion, seizures, headaches, or TIA-like symptoms. No anxiety or depression.      Physical Examination:  Vitals:    01/12/21 0455   BP: 110/69   Pulse: 102   Resp: 20   Temp: 98 °F (36.7 °C)   SpO2: 93%      In: 240 [P.O.:240]  Out: 1140    Wt Readings from Last 3 Encounters:   01/12/21 182 lb 15.7 oz (83 kg)   01/06/21 191 lb (86.6 kg)   01/05/21 189 lb 2021    PROT 6.1 2021    PROT 7.4 2011    AGRATIO 1.3 2021    BILITOT 0.5 2021     Cardiac Enzymes: No results found for: CKTOTAL, CKMB, CKMBINDEX, TROPONINI  Coags:   Lab Results   Component Value Date    PROTIME 13.1 2021    INR 1.13 2021       EC21  Sinus tachycardia with Premature atrial complexes  Inferior infarct , possibly acute  Lateral injury pattern  ACUTE MI   When compared with ECG of 2021 12:16,  Premature atrial complexes are now Present  Nonspecific T wave abnormality, worse in Lateral leads  QT has lengthened    ECHO:  21  Summary   -Normal left ventricle size, wall thickness and systolic function with an   estimated ejection fraction of 55%. -[place] is not well visualized. -E/e\"= 11.6 .   -Normal diastolic function.   - Mild mitral regurgitation.   -Mild tricuspid regurgitation.   -Pasp 31mmHg. Left Heart Cath 20  Dominance: Co      LM: short, MLI  LAD: 80% short, ostial stenosis; three moderate sized diagonals with 90% ostial to proximal  D2 and 70% ostial D3; 40% mid LAD disease  LCx: large first marginal free of significant disease; 85% eccentric mid stenosis followed by 40% proximal OM2   RCA: moderate sized vessel with 95% distal stenosis just prior to two posterolateral branches and RPDA; 90% mid acute marginal      LVEDP: 8 mmHg   LVEF: 65%     Impression/Recommendations:  Discussed with CTS. Admit for CABG evaluation.      Procedure(s): 21  URGENT CABG X5, CORONARY ARTERY BYPASS COOK TO LAD, VEIN GRAFT TO RPDA, SEQUENTAIL VEIN GRAFT TO D3, D2 AND PL BRANCH CIRCUMFLEX, LIGATION LEFT ATRIAL APPENDAGE 40MM ATRICLIP, EVH LEFT LEG, ENDOSCOPICALLY VEIN HARVEST OF LEFT SAPHENOUS VEIN, GRETA AORTIC ULTRASOUND, DOPPLER VERIFICATION OF FLOW TO CONFIRM BYPASS GRAFTS, INSERTION OF VENTRICULAR PACING WIRES, BILATERAL INCOSTAL NEVRE BLOCK 5TH LEVEL WITH EXPAREL AND MARCAINE.     Problem List:   Patient Active Problem List    Diagnosis

## 2021-01-12 NOTE — PROGRESS NOTES
Physical Therapy    Facility/Department: [facility]  Initial Assessment    NAME: Paula Talley  : 1955  MRN: [id number]    Date of Service: 2021    Discharge Recommendations: Paula Talley scored a 8/24 on the AM-PAC short mobility form. Current research shows that an AM-PAC score of 17 or less is typically not associated with a discharge to the patient's home setting. Based on the patient's AM-PAC score and their current functional mobility deficits, it is recommended that the patient have 5-7 sessions per week of Physical Therapy at d/c to increase the patient's independence. At this time, this patient demonstrates the endurance, and/or tolerance for 3 hours of therapy each day, with a treatment frequency of 5-7x/wk. Please see assessment section for further patient specific details. If patient discharges prior to next session this note will serve as a discharge summary. Please see below for the latest assessment towards goals. PT Equipment Recommendations  Equipment Needed: Yes  Mobility Devices: Loida Poot: Rollator (4 Wheeled)  Other: The pt needs frequent seated rest breaks due to SOB from CABG. Assessment   Body structures, Functions, Activity limitations: Decreased strength;Decreased endurance;Decreased balance;Decreased functional mobility   Assessment: The pt presents with decreased balance, strength and activity tolerance that impairs his ability to stand independently and ambulate more than 100'. The pt was independent and working part time prior to admission.   Prognosis: Good  Decision Making: Medium Complexity  PT Education: Transfer Training;PT Role;Plan of Care;Energy Conservation;General Safety  Patient Education: DC recommendation, pt verbalized understanding  Barriers to Learning: none  REQUIRES PT FOLLOW UP: Yes  Activity Tolerance  Activity Tolerance: Patient Tolerated treatment well;Patient limited by endurance  Activity Tolerance: pt was SOB with all activity Patient Diagnosis(es): There were no encounter diagnoses. has a past medical history of Coronary artery disease involving native heart and Hyperlipidemia. has a past surgical history that includes Colonoscopy (05/04/2018) and Coronary artery bypass graft (N/A, 1/8/2021). Restrictions  Restrictions/Precautions  Restrictions/Precautions: Fall Risk(low fall risk)  Position Activity Restriction  Sternal Precautions: No Pushing, No Pulling, 5# Lifting Restrictions  Other position/activity restrictions: Odette Tavera is a 72 y.o. male who presented with complaints of intermittent chest pain. He has a history of HLD. He underwent LHC 1/7 revealing MVD. CABG 1/8/21. Vision/Hearing  Vision: Impaired  Vision Exceptions: Wears glasses at all times  Hearing: Within functional limits     Subjective  General  Chart Reviewed: Yes  Family / Caregiver Present: Yes  Diagnosis: chest pain  Follows Commands: Within Functional Limits  Subjective  Subjective: Pt met in room seated in recliner. Pt states that he has 8/10 pain in chest and in L leg, RN aware. Pain Screening  Patient Currently in Pain: Yes          Orientation  Orientation  Overall Orientation Status: Within Functional Limits  Social/Functional History  Social/Functional History  Lives With: Family  Type of Home: House  Home Layout: Multi-level  Home Access: Stairs to enter without rails  Entrance Stairs - Number of Steps: 2 VEL in back and incline in front of house. Normally enters from back. Bathroom Shower/Tub: Tub/Shower unit  Bathroom Toilet: Standard  Home Equipment: (No DME)  ADL Assistance: Independent  Homemaking Assistance: (Wife helps with laundry and cleaning.   Pt states that he does not cook)  Ambulation Assistance: Independent  Transfer Assistance: Independent  Active : Yes  Occupation: Part time employment(Works part time at [employer])    Objective  AROM RLE (degrees)  RLE AROM: WFL  AROM LLE (degrees)  LLE AROM : WFL  Strength Out 1447         Minutes 33         Timed Code Treatment Minutes: [id number] [facility] [address], [place] DPT [id number]

## 2021-01-12 NOTE — PLAN OF CARE
Nutrition Problem #1: Increased nutrient needs  Intervention: Food and/or Nutrient Delivery: Continue Current Diet, Start Oral Nutrition Supplement  Nutritional Goals: New goal- PO consistently greater than 50% at meals/supp

## 2021-01-12 NOTE — PROGRESS NOTES
Occupational Therapy   Occupational Therapy Initial Assessment  Date: 2021   Patient Name: Rodri Pelletier  MRN: [id number]     : 1955    Date of Service: 2021    Discharge Recommendations:  Rodri Pelletier scored a 15/24 on the AM-PAC ADL Inpatient form. Current research shows that an AM-PAC score of 17 or less is typically not associated with a discharge to the patient's home setting. Based on the patient's AM-PAC score and their current ADL deficits, it is recommended that the patient have 5-7 sessions per week of Occupational Therapy at d/c to increase the patient's independence. At this time, this patient demonstrates the endurance, and/or tolerance for 3 hours of therapy each day, with a treatment frequency of 5-7x/wk. Please see assessment section for further patient specific details. If patient discharges prior to next session this note will serve as a discharge summary. Please see below for the latest assessment towards goals. OT Equipment Recommendations  Equipment Needed: No(pt may benefit from shower chair for endurance at d/c. continue to assess needs)    Assessment   Performance deficits / Impairments: Decreased functional mobility ; Decreased ADL status; Decreased endurance;Decreased high-level IADLs;Decreased strength  Assessment: pt not at baseline level of functioning and would benefit from ongoing skilled OT services in order to return to PLOF. Treatment Diagnosis: CABG post op day 4  Prognosis: Good  Decision Making: Medium Complexity  History: pt independent at baseline  Assistance / Modification: assist of 1  Patient Education: eval, POC, discharge, DME-pt independent with education  REQUIRES OT FOLLOW UP: Yes  Activity Tolerance  Activity Tolerance: Patient Tolerated treatment well  Safety Devices  Safety Devices in place: Yes  Type of devices: All fall risk precautions in place;Nurse notified;Call light within reach; Bed alarm in place; Left in bed  Restraints  Initially in place: No           Patient Diagnosis(es): There were no encounter diagnoses. has a past medical history of Coronary artery disease involving native heart and Hyperlipidemia. has a past surgical history that includes Colonoscopy (05/04/2018) and Coronary artery bypass graft (N/A, 1/8/2021). Treatment Diagnosis: CABG post op day 4      Restrictions  Restrictions/Precautions  Restrictions/Precautions: Fall Risk(low fall risk)  Position Activity Restriction  Sternal Precautions: No Pushing, No Pulling, 5# Lifting Restrictions  Other position/activity restrictions: Elizabeth Rader is a 72 y.o. male who presented with complaints of intermittent chest pain. He has a history of HLD. He underwent LHC 1/7 revealing MVD. CABG 1/8/21. Subjective   General  Chart Reviewed: Yes  Family / Caregiver Present: No  Diagnosis: CABG s/p Day #4  Subjective  Subjective: pt supine upon arrival, flat affect. 8/10 pain in chest and L LE. Patient Currently in Pain: Yes  Pain Assessment  Pain Assessment: 0-10  Pain Level: 8  Pain Type: Surgical pain  Pain Location: Sternum  Pre Treatment Pain Screening  Intervention List: Patient able to continue with treatment;Nurse/Physician notified  Vital Signs  Patient Currently in Pain: Yes  Social/Functional History  Social/Functional History  Lives With: Family  Type of Home: House  Home Layout: Multi-level  Home Access: Stairs to enter without rails  Entrance Stairs - Number of Steps: 2 VEL in back and incline in front of house. Normally enters from back. Bathroom Shower/Tub: Tub/Shower unit  Bathroom Toilet: Standard  Home Equipment: (No DME)  ADL Assistance: Independent  Homemaking Assistance: (Wife helps with laundry and cleaning.   Pt states that he does not cook)  Ambulation Assistance: Independent  Transfer Assistance: Independent  Active : Yes  Occupation: Part time employment(Works part time at [employer])       Objective        Orientation  Overall Orientation Status: (01/12/21 0932)    Goals  Short term goals  Time Frame for Short term goals: discharge  Short term goal 1: bed mobility mod I while maintaining sternal precautions mod I  Short term goal 2: functional mobility with 4WW for ADL/IADL tasks mod I  Short term goal 3: functional ADL transfer while maintaining sternal precautions mod I  Short term goal 4: UB ADLs set up  Short term goal 5: LB ADLs set up with adaptive equipment as needed       Therapy Time   Individual Concurrent Group Co-treatment   Time In 1414         Time Out 1445         Minutes 31           Timed Code Treatment Minutes:   16 minutes    Total Treatment Minutes:  31 minutes      Rolando Diaz OTR/L [id number]      Rolando Diaz OT

## 2021-01-12 NOTE — PROGRESS NOTES
monitor while inpatient    Goals:  New goal- PO consistently greater than 50% at meals/supp       Nutrition Monitoring and Evaluation:   Behavioral-Environmental Outcomes:  None Identified   Food/Nutrient Intake Outcomes:  Food and Nutrient Intake, Supplement Intake  Physical Signs/Symptoms Outcomes:  None Identified, Skin, Weight     Discharge Planning:     Too soon to determine     Electronically signed by Germania Celis RD, CNSC, LD on 1/12/21 at 10:16 AM EST    Contact: [phone]

## 2021-01-13 LAB
ABO/RH: NORMAL
ANION GAP SERPL CALCULATED.3IONS-SCNC: 8 MMOL/L (ref 3–16)
ANTIBODY SCREEN: NORMAL
BLOOD BANK DISPENSE STATUS: NORMAL
BLOOD BANK PRODUCT CODE: NORMAL
BPU ID: NORMAL
BUN BLDV-MCNC: 27 MG/DL (ref 7–20)
CALCIUM SERPL-MCNC: 8.2 MG/DL (ref 8.3–10.6)
CHLORIDE BLD-SCNC: 102 MMOL/L (ref 99–110)
CO2: 26 MMOL/L (ref 21–32)
CREAT SERPL-MCNC: 1.1 MG/DL (ref 0.8–1.3)
DESCRIPTION BLOOD BANK: NORMAL
GFR AFRICAN AMERICAN: >60
GFR NON-AFRICAN AMERICAN: >60
GLUCOSE BLD-MCNC: 142 MG/DL (ref 70–99)
GLUCOSE BLD-MCNC: 158 MG/DL (ref 70–99)
GLUCOSE BLD-MCNC: 160 MG/DL (ref 70–99)
GLUCOSE BLD-MCNC: 166 MG/DL (ref 70–99)
GLUCOSE BLD-MCNC: 167 MG/DL (ref 70–99)
HCT VFR BLD CALC: 21.3 % (ref 40.5–52.5)
HCT VFR BLD CALC: 21.6 % (ref 40.5–52.5)
HCT VFR BLD CALC: 23.5 % (ref 40.5–52.5)
HEMOGLOBIN: 6.9 G/DL (ref 13.5–17.5)
HEMOGLOBIN: 7 G/DL (ref 13.5–17.5)
HEMOGLOBIN: 8.1 G/DL (ref 13.5–17.5)
MAGNESIUM: 2.7 MG/DL (ref 1.8–2.4)
MCH RBC QN AUTO: 30.5 PG (ref 26–34)
MCHC RBC AUTO-ENTMCNC: 32.6 G/DL (ref 31–36)
MCV RBC AUTO: 93.5 FL (ref 80–100)
PDW BLD-RTO: 14.6 % (ref 12.4–15.4)
PERFORMED ON: ABNORMAL
PLATELET # BLD: 271 K/UL (ref 135–450)
PMV BLD AUTO: 7.5 FL (ref 5–10.5)
POTASSIUM SERPL-SCNC: 4 MMOL/L (ref 3.5–5.1)
RBC # BLD: 2.28 M/UL (ref 4.2–5.9)
SODIUM BLD-SCNC: 136 MMOL/L (ref 136–145)
WBC # BLD: 14.3 K/UL (ref 4–11)

## 2021-01-13 PROCEDURE — 85018 HEMOGLOBIN: CPT

## 2021-01-13 PROCEDURE — 2140000000 HC CCU INTERMEDIATE R&B

## 2021-01-13 PROCEDURE — 2000000000 HC ICU R&B

## 2021-01-13 PROCEDURE — 83735 ASSAY OF MAGNESIUM: CPT

## 2021-01-13 PROCEDURE — 99024 POSTOP FOLLOW-UP VISIT: CPT | Performed by: THORACIC SURGERY (CARDIOTHORACIC VASCULAR SURGERY)

## 2021-01-13 PROCEDURE — 94669 MECHANICAL CHEST WALL OSCILL: CPT

## 2021-01-13 PROCEDURE — 6370000000 HC RX 637 (ALT 250 FOR IP): Performed by: THORACIC SURGERY (CARDIOTHORACIC VASCULAR SURGERY)

## 2021-01-13 PROCEDURE — 6360000002 HC RX W HCPCS: Performed by: THORACIC SURGERY (CARDIOTHORACIC VASCULAR SURGERY)

## 2021-01-13 PROCEDURE — 80048 BASIC METABOLIC PNL TOTAL CA: CPT

## 2021-01-13 PROCEDURE — 6360000002 HC RX W HCPCS: Performed by: NURSE PRACTITIONER

## 2021-01-13 PROCEDURE — 85014 HEMATOCRIT: CPT

## 2021-01-13 PROCEDURE — 86923 COMPATIBILITY TEST ELECTRIC: CPT

## 2021-01-13 PROCEDURE — 6370000000 HC RX 637 (ALT 250 FOR IP): Performed by: INTERNAL MEDICINE

## 2021-01-13 PROCEDURE — 99232 SBSQ HOSP IP/OBS MODERATE 35: CPT | Performed by: NURSE PRACTITIONER

## 2021-01-13 PROCEDURE — 86901 BLOOD TYPING SEROLOGIC RH(D): CPT

## 2021-01-13 PROCEDURE — 94761 N-INVAS EAR/PLS OXIMETRY MLT: CPT

## 2021-01-13 PROCEDURE — 36430 TRANSFUSION BLD/BLD COMPNT: CPT

## 2021-01-13 PROCEDURE — 85027 COMPLETE CBC AUTOMATED: CPT

## 2021-01-13 PROCEDURE — 2580000003 HC RX 258

## 2021-01-13 PROCEDURE — 2580000003 HC RX 258: Performed by: THORACIC SURGERY (CARDIOTHORACIC VASCULAR SURGERY)

## 2021-01-13 PROCEDURE — 94640 AIRWAY INHALATION TREATMENT: CPT

## 2021-01-13 PROCEDURE — 86900 BLOOD TYPING SEROLOGIC ABO: CPT

## 2021-01-13 PROCEDURE — 6370000000 HC RX 637 (ALT 250 FOR IP): Performed by: NURSE PRACTITIONER

## 2021-01-13 PROCEDURE — 2700000000 HC OXYGEN THERAPY PER DAY

## 2021-01-13 PROCEDURE — 86850 RBC ANTIBODY SCREEN: CPT

## 2021-01-13 PROCEDURE — P9016 RBC LEUKOCYTES REDUCED: HCPCS

## 2021-01-13 RX ORDER — SODIUM CHLORIDE 9 MG/ML
INJECTION, SOLUTION INTRAVENOUS
Status: COMPLETED
Start: 2021-01-13 | End: 2021-01-13

## 2021-01-13 RX ORDER — MELOXICAM 7.5 MG/1
7.5 TABLET ORAL DAILY
Status: DISCONTINUED | OUTPATIENT
Start: 2021-01-13 | End: 2021-01-15 | Stop reason: HOSPADM

## 2021-01-13 RX ORDER — LATANOPROST 50 UG/ML
1 SOLUTION/ DROPS OPHTHALMIC NIGHTLY
Status: DISCONTINUED | OUTPATIENT
Start: 2021-01-13 | End: 2021-01-13

## 2021-01-13 RX ORDER — SODIUM CHLORIDE 9 MG/ML
INJECTION, SOLUTION INTRAVENOUS PRN
Status: DISCONTINUED | OUTPATIENT
Start: 2021-01-13 | End: 2021-01-14

## 2021-01-13 RX ADMIN — LEVALBUTEROL HYDROCHLORIDE 1.25 MG: 1.25 SOLUTION RESPIRATORY (INHALATION) at 20:09

## 2021-01-13 RX ADMIN — ACETAMINOPHEN 1000 MG: 500 TABLET, FILM COATED ORAL at 03:23

## 2021-01-13 RX ADMIN — HYDROCORTISONE SODIUM SUCCINATE 100 MG: 100 INJECTION, POWDER, FOR SOLUTION INTRAMUSCULAR; INTRAVENOUS at 03:24

## 2021-01-13 RX ADMIN — INSULIN LISPRO 2 UNITS: 100 INJECTION, SOLUTION INTRAVENOUS; SUBCUTANEOUS at 11:46

## 2021-01-13 RX ADMIN — AMIODARONE HYDROCHLORIDE 400 MG: 200 TABLET ORAL at 20:07

## 2021-01-13 RX ADMIN — METOPROLOL TARTRATE 25 MG: 25 TABLET, FILM COATED ORAL at 07:35

## 2021-01-13 RX ADMIN — MUPIROCIN: 20 OINTMENT TOPICAL at 08:14

## 2021-01-13 RX ADMIN — FUROSEMIDE 20 MG: 10 INJECTION, SOLUTION INTRAMUSCULAR; INTRAVENOUS at 16:02

## 2021-01-13 RX ADMIN — FONDAPARINUX SODIUM 2.5 MG: 2.5 INJECTION, SOLUTION SUBCUTANEOUS at 11:48

## 2021-01-13 RX ADMIN — HYDROCORTISONE SODIUM SUCCINATE 50 MG: 100 INJECTION, POWDER, FOR SOLUTION INTRAMUSCULAR; INTRAVENOUS at 20:02

## 2021-01-13 RX ADMIN — LATANOPROST 1 DROP: 50 SOLUTION OPHTHALMIC at 20:10

## 2021-01-13 RX ADMIN — ACETAMINOPHEN 1000 MG: 500 TABLET, FILM COATED ORAL at 20:07

## 2021-01-13 RX ADMIN — LEVALBUTEROL HYDROCHLORIDE 1.25 MG: 1.25 SOLUTION RESPIRATORY (INHALATION) at 08:31

## 2021-01-13 RX ADMIN — INSULIN LISPRO 2 UNITS: 100 INJECTION, SOLUTION INTRAVENOUS; SUBCUTANEOUS at 08:14

## 2021-01-13 RX ADMIN — SODIUM CHLORIDE: 9 INJECTION, SOLUTION INTRAVENOUS at 16:03

## 2021-01-13 RX ADMIN — POTASSIUM CHLORIDE 10 MEQ: 750 TABLET, FILM COATED, EXTENDED RELEASE ORAL at 11:57

## 2021-01-13 RX ADMIN — POTASSIUM CHLORIDE 10 MEQ: 750 TABLET, FILM COATED, EXTENDED RELEASE ORAL at 16:04

## 2021-01-13 RX ADMIN — INSULIN LISPRO 2 UNITS: 100 INJECTION, SOLUTION INTRAVENOUS; SUBCUTANEOUS at 20:14

## 2021-01-13 RX ADMIN — ACETAMINOPHEN 1000 MG: 500 TABLET, FILM COATED ORAL at 11:47

## 2021-01-13 RX ADMIN — Medication 10 ML: at 08:15

## 2021-01-13 RX ADMIN — STANDARDIZED SENNA CONCENTRATE AND DOCUSATE SODIUM 1 TABLET: 8.6; 5 TABLET ORAL at 20:06

## 2021-01-13 RX ADMIN — GABAPENTIN 100 MG: 100 CAPSULE ORAL at 20:06

## 2021-01-13 RX ADMIN — METOPROLOL TARTRATE 25 MG: 25 TABLET, FILM COATED ORAL at 20:07

## 2021-01-13 RX ADMIN — ASPIRIN 325 MG: 325 TABLET, COATED ORAL at 07:32

## 2021-01-13 RX ADMIN — HYDROCORTISONE SODIUM SUCCINATE 100 MG: 100 INJECTION, POWDER, FOR SOLUTION INTRAMUSCULAR; INTRAVENOUS at 11:48

## 2021-01-13 RX ADMIN — STANDARDIZED SENNA CONCENTRATE AND DOCUSATE SODIUM 1 TABLET: 8.6; 5 TABLET ORAL at 07:32

## 2021-01-13 RX ADMIN — FUROSEMIDE 20 MG: 10 INJECTION, SOLUTION INTRAMUSCULAR; INTRAVENOUS at 20:02

## 2021-01-13 RX ADMIN — MAGNESIUM HYDROXIDE 30 ML: 400 SUSPENSION ORAL at 11:56

## 2021-01-13 RX ADMIN — MELOXICAM 7.5 MG: 7.5 TABLET ORAL at 16:01

## 2021-01-13 RX ADMIN — AMIODARONE HYDROCHLORIDE 400 MG: 200 TABLET ORAL at 07:35

## 2021-01-13 RX ADMIN — PANTOPRAZOLE SODIUM 40 MG: 40 TABLET, DELAYED RELEASE ORAL at 07:32

## 2021-01-13 RX ADMIN — ATORVASTATIN CALCIUM 40 MG: 40 TABLET, FILM COATED ORAL at 20:07

## 2021-01-13 RX ADMIN — Medication 10 ML: at 20:01

## 2021-01-13 RX ADMIN — LEVALBUTEROL HYDROCHLORIDE 1.25 MG: 1.25 SOLUTION RESPIRATORY (INHALATION) at 16:03

## 2021-01-13 RX ADMIN — POTASSIUM CHLORIDE 10 MEQ: 750 TABLET, FILM COATED, EXTENDED RELEASE ORAL at 07:32

## 2021-01-13 RX ADMIN — GABAPENTIN 100 MG: 100 CAPSULE ORAL at 16:01

## 2021-01-13 RX ADMIN — FUROSEMIDE 20 MG: 10 INJECTION, SOLUTION INTRAMUSCULAR; INTRAVENOUS at 07:32

## 2021-01-13 RX ADMIN — GABAPENTIN 100 MG: 100 CAPSULE ORAL at 07:35

## 2021-01-13 RX ADMIN — INSULIN LISPRO 2 UNITS: 100 INJECTION, SOLUTION INTRAVENOUS; SUBCUTANEOUS at 16:05

## 2021-01-13 ASSESSMENT — PAIN DESCRIPTION - PAIN TYPE: TYPE: SURGICAL PAIN

## 2021-01-13 ASSESSMENT — PAIN SCALES - GENERAL
PAINLEVEL_OUTOF10: 4
PAINLEVEL_OUTOF10: 6
PAINLEVEL_OUTOF10: 4
PAINLEVEL_OUTOF10: 0
PAINLEVEL_OUTOF10: 0

## 2021-01-13 ASSESSMENT — PAIN DESCRIPTION - LOCATION: LOCATION: STERNUM

## 2021-01-13 NOTE — PROGRESS NOTES
Dr rodriguez at bedside notice pt is back in A fib.  Want to do 75 mg bolus over 30 mins spoke with pharmacy will order and be sending

## 2021-01-13 NOTE — PROGRESS NOTES
Incentive Spirometry education and demonstration completed by Respiratory Therapy Yes      Response to education: Excellent     Teaching Time: 5 minutes    Minimum Predicted Vital Capacity - 660 mL. Patient's Actual Vital Capacity - 800 mL. Turning over to Nursing for routine follow-up Yes. Comments: Pt understands the use and purpose of the IS at this time.  Turned over to nursing    Electronically signed by Dina Webb RCP on 1/12/2021 at 9:54 PM

## 2021-01-13 NOTE — PROGRESS NOTES
[address]   Cardiology Progress Note     Date: 1/13/2021  Admit Date: 1/7/2021     Reason for consultation: chest pain    Chief Complaint: No chief complaint on file. History of Present Illness: History obtained from patient and medical record. Britni Naqvi is a 72 y.o. male who presented with complaints of intermittent chest pain. He has a history of HLD. He underwent LHC 1/7 revealing MVD. Interval Hx: Today, he down to 1L of oxygen. Receiving 1U PRBC. Runs of a fib RVR. No specific complaints. Doing walks and IS. Patient seen and examined. Clinical notes reviewed. Telemetry reviewed. No new complaints today. No major events overnight. Denies having chest pain, palpitations, shortness of breath, orthopnea/PND, cough, or dizziness at the time of this visit. Past Medical History:  Past Medical History:   Diagnosis Date    Coronary artery disease involving native heart     Hyperlipidemia 12/21/2020        Past Surgical History:    has a past surgical history that includes Colonoscopy (05/04/2018) and Coronary artery bypass graft (N/A, 1/8/2021). Social History:  Reviewed. reports that he has never smoked. He has never used smokeless tobacco. He reports that he does not drink alcohol or use drugs. Allergies:  No Known Allergies    Family History:  Reviewed. family history includes Diabetes in his mother; Heart Disease in his paternal uncle; High Blood Pressure in his father and mother. Denies family history of sudden cardiac death, arrhythmia, premature CAD    Home Meds:  Prior to Visit Medications    Medication Sig Taking?  Authorizing Provider   meloxicam (MOBIC) 15 MG tablet Take 1 tablet by mouth daily  Gilberto Urbina MD   latanoprost (XALATAN) 0.005 % ophthalmic solution   Historical Provider, MD        Scheduled Meds:   hydrocortisone sodium succinate PF  50 mg Intravenous Q8H    meloxicam  7.5 mg Oral Daily    levalbuterol  1.25 mg Nebulization Q4H WA  furosemide  20 mg Intravenous TID    gabapentin  100 mg Oral TID    amiodarone  400 mg Oral BID    sodium chloride flush  10 mL Intravenous 2 times per day    fondaparinux  2.5 mg Subcutaneous Daily    aspirin  325 mg Oral Daily    acetaminophen  1,000 mg Oral Q6H    magnesium oxide  400 mg Oral BID    potassium chloride  10 mEq Oral TID WC    sennosides-docusate sodium  1 tablet Oral BID    lisinopril  2.5 mg Oral Lunch    atorvastatin  40 mg Oral Nightly    pantoprazole  40 mg Oral Daily    insulin lispro  0-12 Units Subcutaneous 4x Daily AC & HS    metoprolol tartrate  25 mg Oral BID    latanoprost  1 drop Both Eyes Nightly     Continuous Infusions:   sodium chloride      sodium chloride      amiodarone Stopped (01/11/21 2125)     PRN Meds:sodium chloride, phenol, sodium chloride flush, ondansetron, acetaminophen, oxyCODONE **OR** oxyCODONE, fentanNYL, hydrALAZINE, metoprolol, diphenhydrAMINE, magnesium hydroxide, potassium chloride, magnesium sulfate, calcium chloride IVPB, calcium chloride IVPB, albuterol sulfate HFA     Review of Systems:  · Constitutional: Negative for fever, night sweats, chills, weight changes  · Skin: Negative for rash, pruritus, bleeding, blood clots, or bruising    · HEENT: Negative for vision changes, ringing in the ears, dysphagia, or swollen lymph nodes  · Respiratory: Reviewed in HPI  · Cardiovascular: Reviewed in HPI  · Gastrointestinal: Negative for abdominal pain, N/V/D, constipation, or black/tarry stools  · Genito-Urinary: Negative for dysuria, incontinence, or hematuria  · Musculoskeletal: Negative for joint swelling, muscle pain, or injuries  · Neurological/Psych: Negative for confusion, seizures, headaches, or TIA-like symptoms. No anxiety or depression.      Physical Examination:  Vitals:    01/13/21 1130   BP:    Pulse:    Resp:    Temp:    SpO2: 92%      In: 550.8 [P.O.:240; Blood:310.8]  Out: 1000    Wt Readings from Last 3 Encounters:   01/13/21 191 30 2021    AST 21 2021    ALKPHOS 92 2021    PROT 6.1 2021    PROT 7.4 2011    AGRATIO 1.3 2021    BILITOT 0.5 2021     Cardiac Enzymes: No results found for: CKTOTAL, CKMB, CKMBINDEX, TROPONINI  Coags:   Lab Results   Component Value Date    PROTIME 13.1 2021    INR 1.13 2021       EC21  Sinus tachycardia with Premature atrial complexes  Inferior infarct , possibly acute  Lateral injury pattern  ACUTE MI   When compared with ECG of 2021 12:16,  Premature atrial complexes are now Present  Nonspecific T wave abnormality, worse in Lateral leads  QT has lengthened    ECHO:  21  Summary   -Normal left ventricle size, wall thickness and systolic function with an   estimated ejection fraction of 55%. -[place] is not well visualized. -E/e\"= 11.6 .   -Normal diastolic function.   - Mild mitral regurgitation.   -Mild tricuspid regurgitation.   -Pasp 31mmHg. Left Heart Cath 20  Dominance: Co      LM: short, MLI  LAD: 80% short, ostial stenosis; three moderate sized diagonals with 90% ostial to proximal  D2 and 70% ostial D3; 40% mid LAD disease  LCx: large first marginal free of significant disease; 85% eccentric mid stenosis followed by 40% proximal OM2   RCA: moderate sized vessel with 95% distal stenosis just prior to two posterolateral branches and RPDA; 90% mid acute marginal      LVEDP: 8 mmHg   LVEF: 65%     Impression/Recommendations:  Discussed with CTS. Admit for CABG evaluation.      Procedure(s): 21  URGENT CABG X5, CORONARY ARTERY BYPASS COOK TO LAD, VEIN GRAFT TO RPDA, SEQUENTAIL VEIN GRAFT TO D3, D2 AND PL BRANCH CIRCUMFLEX, LIGATION LEFT ATRIAL APPENDAGE 40MM ATRICLIP, EVH LEFT LEG, ENDOSCOPICALLY VEIN HARVEST OF LEFT SAPHENOUS VEIN, GRETA AORTIC ULTRASOUND, DOPPLER VERIFICATION OF FLOW TO CONFIRM BYPASS GRAFTS, INSERTION OF VENTRICULAR PACING WIRES, BILATERAL INCOSTAL NEVRE BLOCK 5TH LEVEL WITH EXPAREL AND MARCAINE.     Problem

## 2021-01-13 NOTE — PROGRESS NOTES
Spoke with dr rodriguez informed pt is still in A. Fib with heart rate in 120-130's down to 2L of oxygen saturating at 94 pt resting comfortably.  Per Dr. Matta Cure to give another 75 mg bolus over 30 mins

## 2021-01-13 NOTE — PROGRESS NOTES
CC: s/p CABG x 5 / TORREY; post-op a fib    No c/o  NSR now -- has runs of a fib with activity  Hgb 6.9 this morning  CTAB RRR sternum stable  As per CC  Continue diuresis  1 unit PRBC  Monitor BP to increase beta-blocker as bing

## 2021-01-13 NOTE — CARE COORDINATION
Referral for Neil Mon faxed to [place] at [facility] [place]. Awaiting callback. Case management will continue to follow progress and update discharge plan as needed.     CHANDRAKANT GarciaN, .[phone]

## 2021-01-14 ENCOUNTER — APPOINTMENT (OUTPATIENT)
Dept: GENERAL RADIOLOGY | Age: 66
DRG: 166 | End: 2021-01-14
Attending: INTERNAL MEDICINE
Payer: MEDICAID

## 2021-01-14 ENCOUNTER — APPOINTMENT (OUTPATIENT)
Dept: INTERVENTIONAL RADIOLOGY/VASCULAR | Age: 66
DRG: 166 | End: 2021-01-14
Attending: INTERNAL MEDICINE
Payer: MEDICAID

## 2021-01-14 LAB
BLOOD BANK DISPENSE STATUS: NORMAL
BLOOD BANK PRODUCT CODE: NORMAL
BPU ID: NORMAL
DESCRIPTION BLOOD BANK: NORMAL
GLUCOSE BLD-MCNC: 103 MG/DL (ref 70–99)
GLUCOSE BLD-MCNC: 156 MG/DL (ref 70–99)
GLUCOSE BLD-MCNC: 202 MG/DL (ref 70–99)
GLUCOSE BLD-MCNC: 224 MG/DL (ref 70–99)
MAGNESIUM: 2.9 MG/DL (ref 1.8–2.4)
PERFORMED ON: ABNORMAL

## 2021-01-14 PROCEDURE — 36430 TRANSFUSION BLD/BLD COMPNT: CPT

## 2021-01-14 PROCEDURE — P9016 RBC LEUKOCYTES REDUCED: HCPCS

## 2021-01-14 PROCEDURE — 32555 ASPIRATE PLEURA W/ IMAGING: CPT

## 2021-01-14 PROCEDURE — 6370000000 HC RX 637 (ALT 250 FOR IP): Performed by: NURSE PRACTITIONER

## 2021-01-14 PROCEDURE — 2500000003 HC RX 250 WO HCPCS: Performed by: THORACIC SURGERY (CARDIOTHORACIC VASCULAR SURGERY)

## 2021-01-14 PROCEDURE — 94669 MECHANICAL CHEST WALL OSCILL: CPT

## 2021-01-14 PROCEDURE — 2700000000 HC OXYGEN THERAPY PER DAY

## 2021-01-14 PROCEDURE — 71045 X-RAY EXAM CHEST 1 VIEW: CPT

## 2021-01-14 PROCEDURE — 94761 N-INVAS EAR/PLS OXIMETRY MLT: CPT

## 2021-01-14 PROCEDURE — 6360000002 HC RX W HCPCS: Performed by: THORACIC SURGERY (CARDIOTHORACIC VASCULAR SURGERY)

## 2021-01-14 PROCEDURE — 93971 EXTREMITY STUDY: CPT

## 2021-01-14 PROCEDURE — 83735 ASSAY OF MAGNESIUM: CPT

## 2021-01-14 PROCEDURE — 99232 SBSQ HOSP IP/OBS MODERATE 35: CPT | Performed by: NURSE PRACTITIONER

## 2021-01-14 PROCEDURE — 94640 AIRWAY INHALATION TREATMENT: CPT

## 2021-01-14 PROCEDURE — 6360000002 HC RX W HCPCS: Performed by: NURSE PRACTITIONER

## 2021-01-14 PROCEDURE — C1729 CATH, DRAINAGE: HCPCS

## 2021-01-14 PROCEDURE — 6370000000 HC RX 637 (ALT 250 FOR IP): Performed by: INTERNAL MEDICINE

## 2021-01-14 PROCEDURE — APPSS15 APP SPLIT SHARED TIME 0-15 MINUTES: Performed by: NURSE PRACTITIONER

## 2021-01-14 PROCEDURE — APPNB30 APP NON BILLABLE TIME 0-30 MINS: Performed by: NURSE PRACTITIONER

## 2021-01-14 PROCEDURE — 6370000000 HC RX 637 (ALT 250 FOR IP): Performed by: THORACIC SURGERY (CARDIOTHORACIC VASCULAR SURGERY)

## 2021-01-14 PROCEDURE — 2580000003 HC RX 258: Performed by: THORACIC SURGERY (CARDIOTHORACIC VASCULAR SURGERY)

## 2021-01-14 PROCEDURE — 2000000000 HC ICU R&B

## 2021-01-14 RX ORDER — SODIUM CHLORIDE 9 MG/ML
INJECTION, SOLUTION INTRAVENOUS PRN
Status: DISCONTINUED | OUTPATIENT
Start: 2021-01-14 | End: 2021-01-15 | Stop reason: HOSPADM

## 2021-01-14 RX ORDER — LIDOCAINE HYDROCHLORIDE 10 MG/ML
5 INJECTION, SOLUTION EPIDURAL; INFILTRATION; INTRACAUDAL; PERINEURAL ONCE
Status: COMPLETED | OUTPATIENT
Start: 2021-01-14 | End: 2021-01-14

## 2021-01-14 RX ADMIN — INSULIN LISPRO 4 UNITS: 100 INJECTION, SOLUTION INTRAVENOUS; SUBCUTANEOUS at 13:16

## 2021-01-14 RX ADMIN — ACETAMINOPHEN 1000 MG: 500 TABLET, FILM COATED ORAL at 14:57

## 2021-01-14 RX ADMIN — POTASSIUM CHLORIDE 10 MEQ: 750 TABLET, FILM COATED, EXTENDED RELEASE ORAL at 13:22

## 2021-01-14 RX ADMIN — HYDROCORTISONE SODIUM SUCCINATE 50 MG: 100 INJECTION, POWDER, FOR SOLUTION INTRAMUSCULAR; INTRAVENOUS at 05:08

## 2021-01-14 RX ADMIN — ASPIRIN 325 MG: 325 TABLET, COATED ORAL at 07:55

## 2021-01-14 RX ADMIN — GABAPENTIN 100 MG: 100 CAPSULE ORAL at 07:55

## 2021-01-14 RX ADMIN — INSULIN LISPRO 2 UNITS: 100 INJECTION, SOLUTION INTRAVENOUS; SUBCUTANEOUS at 08:20

## 2021-01-14 RX ADMIN — ATORVASTATIN CALCIUM 40 MG: 40 TABLET, FILM COATED ORAL at 20:05

## 2021-01-14 RX ADMIN — METOPROLOL TARTRATE 37.5 MG: 25 TABLET, FILM COATED ORAL at 20:05

## 2021-01-14 RX ADMIN — LEVALBUTEROL HYDROCHLORIDE 1.25 MG: 1.25 SOLUTION RESPIRATORY (INHALATION) at 20:40

## 2021-01-14 RX ADMIN — FUROSEMIDE 20 MG: 10 INJECTION, SOLUTION INTRAMUSCULAR; INTRAVENOUS at 20:05

## 2021-01-14 RX ADMIN — AMIODARONE HYDROCHLORIDE 75 MG: 50 INJECTION, SOLUTION INTRAVENOUS at 18:55

## 2021-01-14 RX ADMIN — ACETAMINOPHEN 1000 MG: 500 TABLET, FILM COATED ORAL at 02:10

## 2021-01-14 RX ADMIN — POTASSIUM CHLORIDE 10 MEQ: 750 TABLET, FILM COATED, EXTENDED RELEASE ORAL at 17:09

## 2021-01-14 RX ADMIN — HYDROCORTISONE SODIUM SUCCINATE 25 MG: 100 INJECTION, POWDER, FOR SOLUTION INTRAMUSCULAR; INTRAVENOUS at 20:04

## 2021-01-14 RX ADMIN — STANDARDIZED SENNA CONCENTRATE AND DOCUSATE SODIUM 1 TABLET: 8.6; 5 TABLET ORAL at 20:04

## 2021-01-14 RX ADMIN — LEVALBUTEROL HYDROCHLORIDE 1.25 MG: 1.25 SOLUTION RESPIRATORY (INHALATION) at 17:11

## 2021-01-14 RX ADMIN — Medication 10 ML: at 20:24

## 2021-01-14 RX ADMIN — ACETAMINOPHEN 1000 MG: 500 TABLET, FILM COATED ORAL at 08:00

## 2021-01-14 RX ADMIN — DIPHENHYDRAMINE HYDROCHLORIDE 25 MG: 25 TABLET ORAL at 23:40

## 2021-01-14 RX ADMIN — HYDROCORTISONE SODIUM SUCCINATE 50 MG: 100 INJECTION, POWDER, FOR SOLUTION INTRAMUSCULAR; INTRAVENOUS at 13:16

## 2021-01-14 RX ADMIN — FUROSEMIDE 20 MG: 10 INJECTION, SOLUTION INTRAMUSCULAR; INTRAVENOUS at 08:00

## 2021-01-14 RX ADMIN — FUROSEMIDE 20 MG: 10 INJECTION, SOLUTION INTRAMUSCULAR; INTRAVENOUS at 14:57

## 2021-01-14 RX ADMIN — LEVALBUTEROL HYDROCHLORIDE 1.25 MG: 1.25 SOLUTION RESPIRATORY (INHALATION) at 12:05

## 2021-01-14 RX ADMIN — METOPROLOL TARTRATE 12.5 MG: 25 TABLET, FILM COATED ORAL at 13:15

## 2021-01-14 RX ADMIN — LATANOPROST 1 DROP: 50 SOLUTION OPHTHALMIC at 20:07

## 2021-01-14 RX ADMIN — STANDARDIZED SENNA CONCENTRATE AND DOCUSATE SODIUM 1 TABLET: 8.6; 5 TABLET ORAL at 07:55

## 2021-01-14 RX ADMIN — POTASSIUM CHLORIDE 10 MEQ: 750 TABLET, FILM COATED, EXTENDED RELEASE ORAL at 07:55

## 2021-01-14 RX ADMIN — INSULIN LISPRO 4 UNITS: 100 INJECTION, SOLUTION INTRAVENOUS; SUBCUTANEOUS at 20:22

## 2021-01-14 RX ADMIN — Medication 10 ML: at 08:00

## 2021-01-14 RX ADMIN — MELOXICAM 7.5 MG: 7.5 TABLET ORAL at 07:55

## 2021-01-14 RX ADMIN — LEVALBUTEROL HYDROCHLORIDE 1.25 MG: 1.25 SOLUTION RESPIRATORY (INHALATION) at 08:33

## 2021-01-14 RX ADMIN — LIDOCAINE HYDROCHLORIDE 5 ML: 10 INJECTION, SOLUTION EPIDURAL; INFILTRATION; INTRACAUDAL; PERINEURAL at 11:26

## 2021-01-14 RX ADMIN — METOPROLOL TARTRATE 25 MG: 25 TABLET, FILM COATED ORAL at 07:55

## 2021-01-14 RX ADMIN — GABAPENTIN 100 MG: 100 CAPSULE ORAL at 20:05

## 2021-01-14 RX ADMIN — ACETAMINOPHEN 1000 MG: 500 TABLET, FILM COATED ORAL at 20:08

## 2021-01-14 RX ADMIN — PANTOPRAZOLE SODIUM 40 MG: 40 TABLET, DELAYED RELEASE ORAL at 07:55

## 2021-01-14 ASSESSMENT — PAIN SCALES - GENERAL
PAINLEVEL_OUTOF10: 0
PAINLEVEL_OUTOF10: 4
PAINLEVEL_OUTOF10: 0
PAINLEVEL_OUTOF10: 0

## 2021-01-14 ASSESSMENT — PAIN DESCRIPTION - PAIN TYPE: TYPE: ACUTE PAIN;SURGICAL PAIN

## 2021-01-14 ASSESSMENT — PAIN DESCRIPTION - ORIENTATION: ORIENTATION: LEFT

## 2021-01-14 NOTE — PROGRESS NOTES
[facility]   Cardiology Progress Note     Date: 1/14/2021  Admit Date: 1/7/2021     Reason for consultation: chest pain    Chief Complaint: No chief complaint on file. History of Present Illness: History obtained from patient and medical record. Chucky Bro is a 72 y.o. male who presented with complaints of intermittent chest pain. He has a history of HLD. He underwent LHC 1/7 revealing MVD. Interval Hx: Today, he continues to have atrial fibrillation. On 1L of oxygen. Pt main complaint is his LLE pain. S/p thoracentesis today. Patient seen and examined. Clinical notes reviewed. Telemetry reviewed. No new complaints today. No major events overnight. Denies having chest pain, palpitations, shortness of breath, orthopnea/PND, cough, or dizziness at the time of this visit. Past Medical History:  Past Medical History:   Diagnosis Date    Coronary artery disease involving native heart     Hyperlipidemia 12/21/2020        Past Surgical History:    has a past surgical history that includes Colonoscopy (05/04/2018) and Coronary artery bypass graft (N/A, 1/8/2021). Social History:  Reviewed. reports that he has never smoked. He has never used smokeless tobacco. He reports that he does not drink alcohol or use drugs. Allergies:  No Known Allergies    Family History:  Reviewed. family history includes Diabetes in his mother; Heart Disease in his paternal uncle; High Blood Pressure in his father and mother. Denies family history of sudden cardiac death, arrhythmia, premature CAD    Home Meds:  Prior to Visit Medications    Medication Sig Taking?  Authorizing Provider   meloxicam (MOBIC) 15 MG tablet Take 1 tablet by mouth daily  Equilla Goldmann, MD   latanoprost (XALATAN) 0.005 % ophthalmic solution   Historical Provider, MD        Scheduled Meds:   metoprolol tartrate  12.5 mg Oral Once    metoprolol tartrate  37.5 mg Oral BID    hydrocortisone sodium succinate PF  50 mg Intravenous Once    hydrocortisone sodium succinate PF  25 mg Intravenous Q8H    meloxicam  7.5 mg Oral Daily    magnesium citrate  296 mL Oral Once    levalbuterol  1.25 mg Nebulization Q4H WA    furosemide  20 mg Intravenous TID    gabapentin  100 mg Oral TID    sodium chloride flush  10 mL Intravenous 2 times per day    [Held by provider] fondaparinux  2.5 mg Subcutaneous Daily    aspirin  325 mg Oral Daily    acetaminophen  1,000 mg Oral Q6H    magnesium oxide  400 mg Oral BID    potassium chloride  10 mEq Oral TID WC    sennosides-docusate sodium  1 tablet Oral BID    lisinopril  2.5 mg Oral Lunch    atorvastatin  40 mg Oral Nightly    pantoprazole  40 mg Oral Daily    insulin lispro  0-12 Units Subcutaneous 4x Daily AC & HS    latanoprost  1 drop Both Eyes Nightly     Continuous Infusions:   sodium chloride      amiodarone Stopped (01/11/21 2125)     PRN Meds:sodium chloride, phenol, sodium chloride flush, ondansetron, acetaminophen, oxyCODONE **OR** oxyCODONE, fentanNYL, hydrALAZINE, metoprolol, diphenhydrAMINE, magnesium hydroxide, potassium chloride, magnesium sulfate, calcium chloride IVPB, calcium chloride IVPB, albuterol sulfate HFA     Review of Systems:  · Constitutional: Negative for fever, night sweats, chills, weight changes  · Skin: Negative for rash, pruritus, bleeding, blood clots, or bruising    · HEENT: Negative for vision changes, ringing in the ears, dysphagia, or swollen lymph nodes  · Respiratory: Reviewed in HPI  · Cardiovascular: Reviewed in HPI  · Gastrointestinal: Negative for abdominal pain, N/V/D, constipation, or black/tarry stools  · Genito-Urinary: Negative for dysuria, incontinence, or hematuria  · Musculoskeletal: Negative for joint swelling, muscle pain, or injuries  · Neurological/Psych: Negative for confusion, seizures, headaches, or TIA-like symptoms. No anxiety or depression.      Physical Examination:  Vitals:    01/14/21 1206   BP:    Pulse:    Resp: 16 Temp:    SpO2: 92%      In: 390 [P.O.:360; I.V.:30]  Out: 1100    Wt Readings from Last 3 Encounters:   01/14/21 194 lb 3.6 oz (88.1 kg)   01/06/21 191 lb (86.6 kg)   01/05/21 189 lb (85.7 kg)       Intake/Output Summary (Last 24 hours) at 1/14/2021 1253  Last data filed at 1/14/2021 0800  Gross per 24 hour   Intake 630 ml   Output 1100 ml   Net -470 ml       Telemetry: Personally Reviewed  - PAF  · Constitutional: Cooperative and in no apparent distress, and appears well nourished  · Skin: Warm and pink; no pallor, cyanosis, clubbing, or bruising   · HEENT: Symmetric and normocephalic. PERRL, EOM intact. Conjunctiva pink with clear sclera. Mucus membranes moist.   · Cardiovascular: Regular rate and rhythm. S1/S2 present without murmurs, no rubs or gallops. Peripheral pulses 2+, capillary refill < 3 seconds. No elevation of JVP. Trace peripheral edema  · Respiratory: Respirations symmetric and unlabored. Lungs clear to auscultation bilaterally, no wheezing, crackles, or rhonchi  · Gastrointestinal: Abdomen soft and round. Bowel sounds normoactive without tenderness or masses. · Musculoskeletal: Bilateral upper and lower extremity strength 5/5 with full ROM  · Neurologic/Psych: Awake and orientated to person, place and time. Calm affect, appropriate mood     Pertinent labs, diagnostic, device, and imaging results reviewed as a part of this visit    Labs:    BMP:   Recent Labs     01/12/21  0506 01/13/21  0333 01/14/21  0510   NA  --  136  --    K  --  4.0  --    CL  --  102  --    CO2  --  26  --    BUN  --  27*  --    CREATININE  --  1.1  --    MG 2.60* 2.70* 2.90*     Estimated Creatinine Clearance: 71 mL/min (based on SCr of 1.1 mg/dL).    CBC:   Recent Labs     01/13/21  0333 01/13/21  0439 01/13/21  1600   WBC 14.3*  --   --    HGB 6.9* 7.0* 8.1*   HCT 21.3* 21.6* 23.5*   MCV 93.5  --   --      --   --      Thyroid:   Lab Results   Component Value Date    TSH 0.86 01/15/2019     Lipids:   Lab Results Component Value Date    CHOL 166 2021    HDL 38 2021    HDL 49 2011    TRIG 77 2021     LFTS:   Lab Results   Component Value Date    ALT 30 2021    AST 21 2021    ALKPHOS 92 2021    PROT 6.1 2021    PROT 7.4 2011    AGRATIO 1.3 2021    BILITOT 0.5 2021     Cardiac Enzymes: No results found for: CKTOTAL, CKMB, CKMBINDEX, TROPONINI  Coags:   Lab Results   Component Value Date    PROTIME 13.1 2021    INR 1.13 2021       EC21  Sinus tachycardia with Premature atrial complexes  Inferior infarct , possibly acute  Lateral injury pattern  ACUTE MI   When compared with ECG of 2021 12:16,  Premature atrial complexes are now Present  Nonspecific T wave abnormality, worse in Lateral leads  QT has lengthened    ECHO:  21  Summary   -Normal left ventricle size, wall thickness and systolic function with an   estimated ejection fraction of 55%. -[place] is not well visualized. -E/e\"= 11.6 .   -Normal diastolic function.   - Mild mitral regurgitation.   -Mild tricuspid regurgitation.   -Pasp 31mmHg. Left Heart Cath 20  Dominance: Co      LM: short, MLI  LAD: 80% short, ostial stenosis; three moderate sized diagonals with 90% ostial to proximal  D2 and 70% ostial D3; 40% mid LAD disease  LCx: large first marginal free of significant disease; 85% eccentric mid stenosis followed by 40% proximal OM2   RCA: moderate sized vessel with 95% distal stenosis just prior to two posterolateral branches and RPDA; 90% mid acute marginal      LVEDP: 8 mmHg   LVEF: 65%     Impression/Recommendations:  Discussed with CTS. Admit for CABG evaluation.      Procedure(s): 21  URGENT CABG X5, CORONARY ARTERY BYPASS COOK TO LAD, VEIN GRAFT TO RPDA, SEQUENTAIL VEIN GRAFT TO D3, D2 AND PL BRANCH CIRCUMFLEX, LIGATION LEFT ATRIAL APPENDAGE 40MM ATRICLIP, EVH LEFT LEG, ENDOSCOPICALLY VEIN HARVEST OF LEFT SAPHENOUS VEIN, GRETA AORTIC ULTRASOUND, DOPPLER VERIFICATION OF FLOW TO CONFIRM BYPASS GRAFTS, INSERTION OF VENTRICULAR PACING WIRES, BILATERAL INCOSTAL NEVRE BLOCK 5TH LEVEL WITH EXPAREL AND MARCAINE.     Problem List:   Patient Active Problem List    Diagnosis Date Noted    Coronary artery disease involving native heart     Abnormal coronary angiogram     Hyperlipidemia 12/21/2020    Dizziness 12/21/2020    Diverticulosis of large intestine without hemorrhage     Bilateral high frequency sensorineural hearing loss 01/26/2018    Pulsatile tinnitus of right ear 05/02/2017    Hordeolum externum of right upper eyelid 01/30/2017    Right-sided tinnitus 01/30/2017    Bilateral chronic serous otitis media 01/30/2017    Chest pain 11/30/2011        Assessment and Plan:     Coronary artery disease   ~ stable; no c/o CP   ~ LHC 1/7: revealing MVD  ~ s/p CABG x5, TORREY ligation 1/8  ~ continue aspirin, lopressor, lipitor     Hypertension   ~ controlled     Hyperlipidemia   ~ continue lipitor 40    Paroxysmal Atrial fibrillation / flutter  ~ stable; rate controlled at time of visit  ~ on amiodarone per CTS   ~ plans for anticoagulation before d/c    Pleural effusion   ~ s/p thoracentesis today     Anemia   ~ received 1U PRBC yesterday     Multiple medical conditions with risk of decompensation. All pertinent information and plan of care discussed with the physician. All questions and concerns were addressed to the patient. Alternatives to my treatment were discussed. I have discussed the above stated plan with patient and the nurse. The patient verbalized understanding and agreed with the plan. Thank you for allowing to us to participate in the care of Alad Araujo     Ashley Tapia APRN-CNP  [address]   Office: [phone]

## 2021-01-14 NOTE — BRIEF OP NOTE
Brief Postoperative Note    Jesus Greenwood  YOB: 1955  [id number]    Pre-operative Diagnosis: left pleural effusion    Post-operative Diagnosis: Same    Procedure: US-guided left thoracentesis    Anesthesia: Local    Surgeons: Vidal Cortez MD    Estimated Blood Loss: Less than 5 mL    Complications: None    Specimens: Was Obtained: left pleural fluid drained and sent for labs    Findings: Successful US-guided left thoracentesis.     Electronically signed by Vidal Cortez MD on 1/14/2021 at 11:23 AM

## 2021-01-14 NOTE — CARE COORDINATION
CM received a message from [facility]/ [address] [place] [address] that agency is not able to staff the referral for this patient. Referral was faxed to [facility].       Referral faxed to Luis Jimenez . .5, BSN, CCM, RN  [facility]  [phone]

## 2021-01-14 NOTE — PROGRESS NOTES
Cardiothoracic Surgery Progress Note    CC: CABG X 5, TORREY clip   POD# 6    Subjective:  Patient currently in atrial fibrillation. Continues to complain of left leg pain at hematoma site. Hemodynamically stable, on I liter nc, afebrile. Alert and oriented X 3. Weight up this am.     WT:88.1 kg/86.8 kg   pre-op 85. kg    Vital Signs:   Vitals:    01/14/21 0833   BP: 125/70   Pulse: 90   Resp: 16   Temp: 96.8   SpO2: 91%      Physical Exam:   Cardiac:  Atrial fib with HR  (runs SR)   Lungs: clear to auscultation, left lung decreased base  Abdomen:  + BM  Vascular:  pulses all palpable   Extremities: generalized, non-pitting edema 1+  :  /775/200   Lasix 20 mg TID  Chest Tube(s) & Incision(s):    Sternum:  Edges approximated, no drainage  Chest tube site: C/D/I     Left leg incision:  Edges well approximated, no drainage  Left upper leg hematoma firm and tender to touch       Labs:   CBC:   Recent Labs     01/13/21  0333 01/13/21  0439 01/13/21  1600   WBC 14.3*  --   --    HGB 6.9* 7.0* 8.1*   HCT 21.3* 21.6* 23.5*     --   --      BMP:   Recent Labs     01/13/21  0333 01/14/21  0510   K 4.0  --    CREATININE 1.1  --    CALCIUM 8.2*  --    MG 2.70* 2.90*     Assessment/Plan:  As per CC:     Plan:   -IR today for ultrasound/possible thoracentesis left lung  -Ultrasound left upper leg - found large hematoma. Will wrap left leg from ankle to groin with ace wrap and will need for at least 7 days. Transfuse I unit PRBC.    -CBC tomorrow  -Continue to taper steroids  -Will address anticoagulation for atrial fib tomorrow due to possible IR/thoracentesis today, hold arixtra today   -Aggressive IS  -Diuresis - strict I/O, fluid restriction   -Wean oxygen - still on I liter   -Ambulate    Disp:  Plan for home with home care        Electronically signed by ALEXANDER Vasquez - CNP on 1/14/2021 at 8:48 AM

## 2021-01-15 ENCOUNTER — TELEPHONE (OUTPATIENT)
Dept: CARDIOLOGY CLINIC | Age: 66
End: 2021-01-15

## 2021-01-15 ENCOUNTER — TELEPHONE (OUTPATIENT)
Dept: INTERNAL MEDICINE CLINIC | Age: 66
End: 2021-01-15

## 2021-01-15 VITALS
SYSTOLIC BLOOD PRESSURE: 131 MMHG | WEIGHT: 191.36 LBS | TEMPERATURE: 97.5 F | HEIGHT: 67 IN | RESPIRATION RATE: 16 BRPM | HEART RATE: 100 BPM | OXYGEN SATURATION: 92 % | BODY MASS INDEX: 30.03 KG/M2 | DIASTOLIC BLOOD PRESSURE: 68 MMHG

## 2021-01-15 LAB
GLUCOSE BLD-MCNC: 112 MG/DL (ref 70–99)
GLUCOSE BLD-MCNC: 115 MG/DL (ref 70–99)
HCT VFR BLD CALC: 26.5 % (ref 40.5–52.5)
HEMOGLOBIN: 8.6 G/DL (ref 13.5–17.5)
INR BLD: 1.11 (ref 0.86–1.14)
MAGNESIUM: 3 MG/DL (ref 1.8–2.4)
MCH RBC QN AUTO: 29.9 PG (ref 26–34)
MCHC RBC AUTO-ENTMCNC: 32.6 G/DL (ref 31–36)
MCV RBC AUTO: 91.9 FL (ref 80–100)
PDW BLD-RTO: 14.8 % (ref 12.4–15.4)
PERFORMED ON: ABNORMAL
PERFORMED ON: ABNORMAL
PLATELET # BLD: 377 K/UL (ref 135–450)
PMV BLD AUTO: 7 FL (ref 5–10.5)
PROTHROMBIN TIME: 12.9 SEC (ref 10–13.2)
RBC # BLD: 2.88 M/UL (ref 4.2–5.9)
WBC # BLD: 15.2 K/UL (ref 4–11)

## 2021-01-15 PROCEDURE — 2580000003 HC RX 258: Performed by: THORACIC SURGERY (CARDIOTHORACIC VASCULAR SURGERY)

## 2021-01-15 PROCEDURE — 99232 SBSQ HOSP IP/OBS MODERATE 35: CPT | Performed by: NURSE PRACTITIONER

## 2021-01-15 PROCEDURE — 6360000002 HC RX W HCPCS: Performed by: THORACIC SURGERY (CARDIOTHORACIC VASCULAR SURGERY)

## 2021-01-15 PROCEDURE — 6370000000 HC RX 637 (ALT 250 FOR IP): Performed by: NURSE PRACTITIONER

## 2021-01-15 PROCEDURE — 94640 AIRWAY INHALATION TREATMENT: CPT

## 2021-01-15 PROCEDURE — 85027 COMPLETE CBC AUTOMATED: CPT

## 2021-01-15 PROCEDURE — 6370000000 HC RX 637 (ALT 250 FOR IP): Performed by: THORACIC SURGERY (CARDIOTHORACIC VASCULAR SURGERY)

## 2021-01-15 PROCEDURE — 83735 ASSAY OF MAGNESIUM: CPT

## 2021-01-15 PROCEDURE — 97530 THERAPEUTIC ACTIVITIES: CPT

## 2021-01-15 PROCEDURE — APPSS15 APP SPLIT SHARED TIME 0-15 MINUTES: Performed by: NURSE PRACTITIONER

## 2021-01-15 PROCEDURE — 85610 PROTHROMBIN TIME: CPT

## 2021-01-15 PROCEDURE — APPNB30 APP NON BILLABLE TIME 0-30 MINS: Performed by: NURSE PRACTITIONER

## 2021-01-15 PROCEDURE — 36592 COLLECT BLOOD FROM PICC: CPT

## 2021-01-15 PROCEDURE — 97116 GAIT TRAINING THERAPY: CPT

## 2021-01-15 PROCEDURE — 6360000002 HC RX W HCPCS: Performed by: NURSE PRACTITIONER

## 2021-01-15 RX ORDER — SENNA AND DOCUSATE SODIUM 50; 8.6 MG/1; MG/1
1 TABLET, FILM COATED ORAL 2 TIMES DAILY
Qty: 60 TABLET | Refills: 0 | Status: SHIPPED | OUTPATIENT
Start: 2021-01-15 | End: 2021-03-15

## 2021-01-15 RX ORDER — ATORVASTATIN CALCIUM 40 MG/1
40 TABLET, FILM COATED ORAL NIGHTLY
Qty: 30 TABLET | Refills: 3 | Status: SHIPPED | OUTPATIENT
Start: 2021-01-15 | End: 2021-04-14 | Stop reason: SDUPTHER

## 2021-01-15 RX ORDER — LISINOPRIL 2.5 MG/1
2.5 TABLET ORAL
Qty: 30 TABLET | Refills: 3 | Status: SHIPPED | OUTPATIENT
Start: 2021-01-16 | End: 2021-01-22

## 2021-01-15 RX ORDER — AMIODARONE HYDROCHLORIDE 200 MG/1
400 TABLET ORAL 2 TIMES DAILY
Status: DISCONTINUED | OUTPATIENT
Start: 2021-01-15 | End: 2021-01-15 | Stop reason: HOSPADM

## 2021-01-15 RX ORDER — PANTOPRAZOLE SODIUM 40 MG/1
40 TABLET, DELAYED RELEASE ORAL DAILY
Qty: 30 TABLET | Refills: 0 | Status: SHIPPED | OUTPATIENT
Start: 2021-01-16 | End: 2021-03-15

## 2021-01-15 RX ORDER — METOPROLOL TARTRATE 50 MG/1
50 TABLET, FILM COATED ORAL 2 TIMES DAILY
Qty: 180 TABLET | Refills: 3 | Status: SHIPPED | OUTPATIENT
Start: 2021-01-15 | End: 2021-06-09 | Stop reason: SDUPTHER

## 2021-01-15 RX ORDER — AMIODARONE HYDROCHLORIDE 200 MG/1
TABLET ORAL
Qty: 56 TABLET | Refills: 0 | Status: SHIPPED | OUTPATIENT
Start: 2021-01-15 | End: 2021-02-24

## 2021-01-15 RX ORDER — WARFARIN SODIUM 2.5 MG/1
2.5 TABLET ORAL
Status: DISCONTINUED | OUTPATIENT
Start: 2021-01-15 | End: 2021-01-15

## 2021-01-15 RX ORDER — GABAPENTIN 100 MG/1
100 CAPSULE ORAL 3 TIMES DAILY
Qty: 90 CAPSULE | Refills: 0 | Status: SHIPPED | OUTPATIENT
Start: 2021-01-15 | End: 2021-02-24

## 2021-01-15 RX ORDER — OXYCODONE HYDROCHLORIDE 5 MG/1
5 TABLET ORAL EVERY 4 HOURS PRN
Qty: 42 TABLET | Refills: 0 | Status: SHIPPED | OUTPATIENT
Start: 2021-01-15 | End: 2021-01-22

## 2021-01-15 RX ORDER — ACETAMINOPHEN 500 MG
1000 TABLET ORAL EVERY 6 HOURS
Qty: 120 TABLET | Refills: 3 | Status: SHIPPED | OUTPATIENT
Start: 2021-01-15

## 2021-01-15 RX ADMIN — FUROSEMIDE 20 MG: 10 INJECTION, SOLUTION INTRAMUSCULAR; INTRAVENOUS at 09:20

## 2021-01-15 RX ADMIN — GABAPENTIN 100 MG: 100 CAPSULE ORAL at 15:31

## 2021-01-15 RX ADMIN — LISINOPRIL 2.5 MG: 5 TABLET ORAL at 12:18

## 2021-01-15 RX ADMIN — HYDROCORTISONE SODIUM SUCCINATE 25 MG: 100 INJECTION, POWDER, FOR SOLUTION INTRAMUSCULAR; INTRAVENOUS at 11:53

## 2021-01-15 RX ADMIN — GABAPENTIN 100 MG: 100 CAPSULE ORAL at 09:19

## 2021-01-15 RX ADMIN — ACETAMINOPHEN 1000 MG: 500 TABLET, FILM COATED ORAL at 09:16

## 2021-01-15 RX ADMIN — AMIODARONE HYDROCHLORIDE 400 MG: 200 TABLET ORAL at 15:32

## 2021-01-15 RX ADMIN — POTASSIUM CHLORIDE 10 MEQ: 750 TABLET, FILM COATED, EXTENDED RELEASE ORAL at 11:53

## 2021-01-15 RX ADMIN — MELOXICAM 7.5 MG: 7.5 TABLET ORAL at 09:19

## 2021-01-15 RX ADMIN — ASPIRIN 325 MG: 325 TABLET, COATED ORAL at 09:16

## 2021-01-15 RX ADMIN — POTASSIUM CHLORIDE 10 MEQ: 750 TABLET, FILM COATED, EXTENDED RELEASE ORAL at 09:22

## 2021-01-15 RX ADMIN — LEVALBUTEROL HYDROCHLORIDE 1.25 MG: 1.25 SOLUTION RESPIRATORY (INHALATION) at 12:44

## 2021-01-15 RX ADMIN — ACETAMINOPHEN 1000 MG: 500 TABLET, FILM COATED ORAL at 15:32

## 2021-01-15 RX ADMIN — LEVALBUTEROL HYDROCHLORIDE 1.25 MG: 1.25 SOLUTION RESPIRATORY (INHALATION) at 08:47

## 2021-01-15 RX ADMIN — STANDARDIZED SENNA CONCENTRATE AND DOCUSATE SODIUM 1 TABLET: 8.6; 5 TABLET ORAL at 09:19

## 2021-01-15 RX ADMIN — PANTOPRAZOLE SODIUM 40 MG: 40 TABLET, DELAYED RELEASE ORAL at 09:16

## 2021-01-15 RX ADMIN — HYDROCORTISONE SODIUM SUCCINATE 25 MG: 100 INJECTION, POWDER, FOR SOLUTION INTRAMUSCULAR; INTRAVENOUS at 04:18

## 2021-01-15 RX ADMIN — Medication 10 ML: at 09:00

## 2021-01-15 RX ADMIN — METOPROLOL TARTRATE 37.5 MG: 25 TABLET, FILM COATED ORAL at 09:19

## 2021-01-15 ASSESSMENT — PAIN SCALES - GENERAL
PAINLEVEL_OUTOF10: 0

## 2021-01-15 NOTE — PROGRESS NOTES
[address]   Cardiology Progress Note     Date: 1/15/2021  Admit Date: 1/7/2021     Reason for consultation: chest pain    Chief Complaint: No chief complaint on file. History of Present Illness: History obtained from patient and medical record. Zulay Carrion is a 72 y.o. male who presented with complaints of intermittent chest pain. He has a history of HLD. He underwent LHC 1/7 revealing MVD. Interval Hx: Today, he continues to have atrial fibrillation/ flutter and is asymptomatic. Continues on 1L of oxygen. Continues to feel better today. LLE wrapped and pain improving. Patient seen and examined. Clinical notes reviewed. Telemetry reviewed. No new complaints today. No major events overnight. Denies having chest pain, palpitations, shortness of breath, orthopnea/PND, cough, or dizziness at the time of this visit. Past Medical History:  Past Medical History:   Diagnosis Date    Coronary artery disease involving native heart     Hyperlipidemia 12/21/2020        Past Surgical History:    has a past surgical history that includes Colonoscopy (05/04/2018) and Coronary artery bypass graft (N/A, 1/8/2021). Social History:  Reviewed. reports that he has never smoked. He has never used smokeless tobacco. He reports that he does not drink alcohol or use drugs. Allergies:  No Known Allergies    Family History:  Reviewed. family history includes Diabetes in his mother; Heart Disease in his paternal uncle; High Blood Pressure in his father and mother. Denies family history of sudden cardiac death, arrhythmia, premature CAD    Home Meds:  Prior to Visit Medications    Medication Sig Taking?  Authorizing Provider   meloxicam (MOBIC) 15 MG tablet Take 1 tablet by mouth daily  Lorena Saldana MD   latanoprost (XALATAN) 0.005 % ophthalmic solution   Historical Provider, MD        Scheduled Meds:   metoprolol tartrate  37.5 mg Oral BID    hydrocortisone sodium succinate PF  25 mg Intravenous Q8H    meloxicam  7.5 mg Oral Daily    magnesium citrate  296 mL Oral Once    levalbuterol  1.25 mg Nebulization Q4H WA    furosemide  20 mg Intravenous TID    gabapentin  100 mg Oral TID    sodium chloride flush  10 mL Intravenous 2 times per day    [Held by provider] fondaparinux  2.5 mg Subcutaneous Daily    aspirin  325 mg Oral Daily    acetaminophen  1,000 mg Oral Q6H    magnesium oxide  400 mg Oral BID    potassium chloride  10 mEq Oral TID WC    sennosides-docusate sodium  1 tablet Oral BID    lisinopril  2.5 mg Oral Lunch    atorvastatin  40 mg Oral Nightly    pantoprazole  40 mg Oral Daily    insulin lispro  0-12 Units Subcutaneous 4x Daily AC & HS    latanoprost  1 drop Both Eyes Nightly     Continuous Infusions:   sodium chloride       PRN Meds:sodium chloride, phenol, sodium chloride flush, ondansetron, acetaminophen, oxyCODONE **OR** oxyCODONE, fentanNYL, hydrALAZINE, metoprolol, diphenhydrAMINE, magnesium hydroxide, potassium chloride, magnesium sulfate, calcium chloride IVPB, calcium chloride IVPB, albuterol sulfate HFA     Review of Systems:  · Constitutional: Negative for fever, night sweats, chills, weight changes  · Skin: Negative for rash, pruritus, bleeding, blood clots, or bruising    · HEENT: Negative for vision changes, ringing in the ears, dysphagia, or swollen lymph nodes  · Respiratory: Reviewed in HPI  · Cardiovascular: Reviewed in HPI  · Gastrointestinal: Negative for abdominal pain, N/V/D, constipation, or black/tarry stools  · Genito-Urinary: Negative for dysuria, incontinence, or hematuria  · Musculoskeletal: Negative for joint swelling, muscle pain, or injuries  · Neurological/Psych: Negative for confusion, seizures, headaches, or TIA-like symptoms. No anxiety or depression.      Physical Examination:  Vitals:    01/15/21 0916   BP: 134/69   Pulse: 100   Resp:    Temp:    SpO2:       In: 728 [P.O.:360; Blood:368]  Out: 700    Wt Readings from Last 3 Encounters:   01/15/21 191 lb 5.8 oz (86.8 kg)   01/06/21 191 lb (86.6 kg)   01/05/21 189 lb (85.7 kg)       Intake/Output Summary (Last 24 hours) at 1/15/2021 7671  Last data filed at 1/14/2021 2331  Gross per 24 hour   Intake 728 ml   Output 700 ml   Net 28 ml       Telemetry: Personally Reviewed  - PAFib/flutter  · Constitutional: Cooperative and in no apparent distress, and appears well nourished  · Skin: Warm and pink; no pallor, cyanosis, clubbing, or bruising   · HEENT: Symmetric and normocephalic. PERRL, EOM intact. Conjunctiva pink with clear sclera. Mucus membranes moist.   · Cardiovascular: Regular rate and rhythm. S1/S2 present without murmurs, no rubs or gallops. Peripheral pulses 2+, capillary refill < 3 seconds. No elevation of JVP. Trace peripheral edema  · Respiratory: Respirations symmetric and unlabored. Lungs clear to auscultation bilaterally, no wheezing, crackles, or rhonchi  · Gastrointestinal: Abdomen soft and round. Bowel sounds normoactive without tenderness or masses. · Musculoskeletal: Bilateral upper and lower extremity strength 5/5 with full ROM  · Neurologic/Psych: Awake and orientated to person, place and time. Calm affect, appropriate mood     Pertinent labs, diagnostic, device, and imaging results reviewed as a part of this visit    Labs:    BMP:   Recent Labs     01/13/21  0333 01/14/21  0510 01/15/21  0415     --   --    K 4.0  --   --      --   --    CO2 26  --   --    BUN 27*  --   --    CREATININE 1.1  --   --    MG 2.70* 2.90* 3.00*     Estimated Creatinine Clearance: 70 mL/min (based on SCr of 1.1 mg/dL).    CBC:   Recent Labs     01/13/21  0333 01/13/21  0439 01/13/21  1600 01/15/21  0415   WBC 14.3*  --   --  15.2*   HGB 6.9* 7.0* 8.1* 8.6*   HCT 21.3* 21.6* 23.5* 26.5*   MCV 93.5  --   --  91.9     --   --  377     Thyroid:   Lab Results   Component Value Date    TSH 0.86 01/15/2019     Lipids:   Lab Results   Component Value Date    CHOL 166 01/07/2021 HDL 38 2021    HDL 49 2011    TRIG 77 2021     LFTS:   Lab Results   Component Value Date    ALT 30 2021    AST 21 2021    ALKPHOS 92 2021    PROT 6.1 2021    PROT 7.4 2011    AGRATIO 1.3 2021    BILITOT 0.5 2021     Cardiac Enzymes: No results found for: CKTOTAL, CKMB, CKMBINDEX, TROPONINI  Coags:   Lab Results   Component Value Date    PROTIME 13.1 2021    INR 1.13 2021       EC21  Sinus tachycardia with Premature atrial complexes  Inferior infarct , possibly acute  Lateral injury pattern  ACUTE MI   When compared with ECG of 2021 12:16,  Premature atrial complexes are now Present  Nonspecific T wave abnormality, worse in Lateral leads  QT has lengthened    ECHO:  21  Summary   -Normal left ventricle size, wall thickness and systolic function with an   estimated ejection fraction of 55%. -[place] is not well visualized. -E/e\"= 11.6 .   -Normal diastolic function.   - Mild mitral regurgitation.   -Mild tricuspid regurgitation.   -Pasp 31mmHg. Left Heart Cath 20  Dominance: Co      LM: short, MLI  LAD: 80% short, ostial stenosis; three moderate sized diagonals with 90% ostial to proximal  D2 and 70% ostial D3; 40% mid LAD disease  LCx: large first marginal free of significant disease; 85% eccentric mid stenosis followed by 40% proximal OM2   RCA: moderate sized vessel with 95% distal stenosis just prior to two posterolateral branches and RPDA; 90% mid acute marginal      LVEDP: 8 mmHg   LVEF: 65%     Impression/Recommendations:  Discussed with CTS. Admit for CABG evaluation.      Procedure(s): 21  URGENT CABG X5, CORONARY ARTERY BYPASS COOK TO LAD, VEIN GRAFT TO RPDA, SEQUENTAIL VEIN GRAFT TO D3, D2 AND PL BRANCH CIRCUMFLEX, LIGATION LEFT ATRIAL APPENDAGE 40MM ATRICLIP, EVH LEFT LEG, ENDOSCOPICALLY VEIN HARVEST OF LEFT SAPHENOUS VEIN, GRETA AORTIC ULTRASOUND, DOPPLER VERIFICATION OF FLOW TO CONFIRM BYPASS GRAFTS, INSERTION OF VENTRICULAR PACING WIRES, BILATERAL INCOSTAL NEVRE BLOCK 5TH LEVEL WITH EXPAREL AND MARCAINE.     LLE venous US: 1/14/21  Summary        No evidence of deep vein or superficial vein thrombosis involving the left    lower extremity and the right common femoral vein.        Heterogeneous area extending from the left groin down the medial thigh along    the pathway of the harvested GSV measuring 22cm x 1.7cm., likely a hematoma. Problem List:   Patient Active Problem List    Diagnosis Date Noted    Coronary artery disease involving native heart     Abnormal coronary angiogram     Hyperlipidemia 12/21/2020    Dizziness 12/21/2020    Diverticulosis of large intestine without hemorrhage     Bilateral high frequency sensorineural hearing loss 01/26/2018    Pulsatile tinnitus of right ear 05/02/2017    Hordeolum externum of right upper eyelid 01/30/2017    Right-sided tinnitus 01/30/2017    Bilateral chronic serous otitis media 01/30/2017    Chest pain 11/30/2011        Assessment and Plan:     Coronary artery disease   ~ stable; no c/o CP   ~ LHC 1/7: revealing MVD  ~ s/p CABG x5, TORREY ligation 1/8  ~ continue aspirin, lopressor, lipitor     Hypertension   ~ controlled     Hyperlipidemia   ~ continue lipitor 40    Paroxysmal Atrial fibrillation / flutter  ~ intermittent episodes of RVR  ~ lopressor increased yesterday    ~ plans for anticoagulation before d/c    Pleural effusion   ~ s/p thoracentesis 1/14    Anemia / LLE hematoma   ~ s/p transfusions    ~ duplex negative for DVT  ~ hgb stable at 8.6 today    Multiple medical conditions with risk of decompensation. All pertinent information and plan of care discussed with the physician. All questions and concerns were addressed to the patient. Alternatives to my treatment were discussed. I have discussed the above stated plan with patient and the nurse. The patient verbalized understanding and agreed with the plan.     Thank you for allowing

## 2021-01-15 NOTE — PROGRESS NOTES
Physical Therapy  Facility/Department: [facility]  Daily Treatment Note  NAME: Erika Conway  : 1955  MRN: [id number]    Date of Service: 1/15/2021    Discharge Recommendations: Erika Conway scored a 18/24 on the AM-PAC short mobility form. Current research shows that an AM-PAC score of 18 or greater is typically associated with a discharge to the patient's home setting. Based on the patient's AM-PAC score and their current functional mobility deficits, it is recommended that the patient have 2-3 sessions per week of Physical Therapy at d/c to increase the patient's independence. At this time, this patient demonstrates the endurance and safety to discharge home with home services and a follow up treatment frequency of 2-3x/wk. Please see assessment section for further patient specific details. If patient discharges prior to next session this note will serve as a discharge summary. Please see below for the latest assessment towards goals. HOME HEALTH CARE: LEVEL 3 SAFETY     - Initial home health evaluation to occur within 24-48 hours, in patient home   - Therapy evaluations in home within 24-48 hours of discharge; including DME and home safety   - Frontload therapy 5 days, then 3x a week   - Therapy to evaluate if patient has [address] needs for personal care   -  evaluation within 24-48 hours, includes evaluation of resources and insurance to determine [place], [place], LTC, and Medicaid options        PT Equipment Recommendations  Equipment Needed: Yes  Mobility Devices: Robeenetta Simon: Rollator (4 Wheeled)  Other: The pt needs frequent seated rest breaks due to SOB from CABG. He would also benefit from a shower chair and toilet raiser. Assessment   Body structures, Functions, Activity limitations: Decreased strength;Decreased endurance;Decreased balance;Decreased functional mobility   Assessment: The pt is improving in his balance, transfers and activity tolerance.   He continues with overall decreased endurance due to CABG. He requires verbal reminders to follow sternal precautions. Prognosis: Good  PT Education: Transfer Training;PT Role;Plan of Care;Energy Conservation;General Safety;Gait Training;Equipment  Patient Education: Education on setting up main floor to stay on while he recovers, benefits shower chair/toilet raiser, importance of continued activity after leaving hospital.  Pt verbalized understanding  Barriers to Learning: none  REQUIRES PT FOLLOW UP: Yes  Activity Tolerance  Activity Tolerance: Patient Tolerated treatment well  Activity Tolerance:  bpm at rest, 125 bpm with activity. O2 sat on room air 88% after ambulation with increase to 91% within 30 seconds. Patient Diagnosis(es): There were no encounter diagnoses. has a past medical history of Coronary artery disease involving native heart and Hyperlipidemia. has a past surgical history that includes Colonoscopy (05/04/2018) and Coronary artery bypass graft (N/A, 1/8/2021). Restrictions  Restrictions/Precautions  Restrictions/Precautions: Fall Risk(low fall risk)  Position Activity Restriction  Sternal Precautions: No Pushing, No Pulling, 5# Lifting Restrictions  Sternal Precautions: O2 at start of therapy: 94% on 1L. O2 after bathroom, prior to walk: 96% without O2. O2 after walk: 88-90%, and with increased rest it stabilized to 97%  Other position/activity restrictions: Zulay Carrion is a 72 y.o. male who presented with complaints of intermittent chest pain. He has a history of HLD. He underwent LHC 1/7 revealing MVD. CABG 1/8/21. Subjective   General  Chart Reviewed: Yes  Response To Previous Treatment: Patient with no complaints from previous session.   Family / Caregiver Present: No  Subjective  Subjective: pt denied pain at rest, pt stated he prefers to DC home with his wife and daughter as opposed to DC to a rehab unit  General Comment  Comments: pt was supine in bed upon PT arrival  Pain Screening  Patient Currently in Pain: Denies  Vital Signs  Patient Currently in Pain: Denies       Orientation  Orientation  Overall Orientation Status: Within Functional Limits       Objective   Bed mobility  Supine to Sit: Minimal assistance(VC to follow sternal precautions)  Scooting: Minimal assistance  Transfers  Sit to Stand: Contact guard assistance  Stand to sit: Contact guard assistance  Ambulation  Ambulation?: Yes(NP gave therapists permission to initiate ambulation on RA.)  Ambulation 1  Surface: level tile  Device: Rollator  Assistance: Stand by assistance  Quality of Gait: Improved motivation, slow dorinda and decreased stride length  Distance: 200'  Stairs/Curb  Stairs?: No     Balance  Posture: Good  Sitting - Static: Good  Sitting - Dynamic: Good  Standing - Static: Good  Standing - Dynamic: Fair;+  Comments: Pt stood to urinate and wash his hands with SBA for balance. Required UE support for safe ambulation            Comment: Discharge options discussed and pt expressed he prefers to DC home. He has 24 hour care available from his wife or daughter. Pt states that his bedroom is on top floor, but clarified that he could stay on main level if needed but does not have bed. Does have recliner he can sleep on, and was educated to not pull/push on leg rest.  Pt has a bathroom in the main level of the house. Pt educated on use of shower chair, toilet raiser and [id number]. Pt also educated on the importance of increasing his activity and ambulation each day. AM-PAC Score  AM-PAC Inpatient Mobility Raw Score : 18 (01/15/21 1052)  AM-PAC Inpatient T-Scale Score : 43.63 (01/15/21 1052)  Mobility Inpatient CMS 0-100% Score: 46.58 (01/15/21 1052)  Mobility Inpatient CMS G-Code Modifier : CK (01/15/21 1052)          Goals  Short term goals  Time Frame for Short term goals:  To be met prior to DC  Short term goal 1: Pt will perform bed mobility with min A -MET 1/15  Short term goal 2: Pt will perform sit to/from stand with SBA  Short term goal 3: Pt will ambulate 200' with AAD and SBA -MET 1/15  Short term goal 4: Pt will ascend/descend 4 with HR and CGA  Short term goal 5: Ambulate 300' with AAD and supervision  Patient Goals   Patient goals : ultimately return home  *one goal added, 2 goals met    Plan    Plan  Times per week: 3-5  Current Treatment Recommendations: Strengthening, Functional Mobility Training, Transfer Training, Balance Training, Endurance Training, Gait Training, Stair training, Neuromuscular Re-education, Safety Education & Training  Safety Devices  Type of devices:  All fall risk precautions in place, Call light within reach, Left in chair, Nurse notified     Therapy Time   Individual Concurrent Group Co-treatment   Time In 1003         Time Out 1035         Minutes 32         Timed Code Treatment Minutes: [address] , [place] DPT [id number]

## 2021-01-15 NOTE — TELEPHONE ENCOUNTER
Carlyle Fay, w/[facility], asking if Dr Aristides Santos will sign orders for SN/OT/PT. Patient is being d/c from [facility] today.

## 2021-01-15 NOTE — PROGRESS NOTES
Open heart discharge instructions reviewed thoroughly with patient and their family. All medications reviewed and paperwork signed. Prescriptions given to patient. All questions regarding discharge instructions and medications answered. Home health care set up with [facility]. All paperwork faxed to home health care agency and the surgeon's office. Open heart binder given to patient.          Electronically signed by Damion Howe RN on 1/15/2021 at 5:03 PM

## 2021-01-15 NOTE — PROGRESS NOTES
Cardiothoracic Surgery Progress Note    CC: CABG X 5, TORREY clip  POD# 7    Subjective:  Patient in atrial flutter (RVR when up) and received another 75 mg bolus last evening. Hemodynamically stable, currently on 1 liter (sat 94%) but turned to RA for trial, afebrile. Alert and oriented X 3. Weight down this am. IR 1/14- tapped 100cc fluid off left lung. Transfused 1 unit prbc yesterday.     WT:86.8 kg/88.1 kg   pre-op 85.4 kg    Vital Signs:   Vitals:    01/15/21 0916   BP: 134/69   Pulse: 100   Resp: 16   Temp: 97.3   SpO2: 92      Physical Exam:   Cardiac:  Atrial flutter - has been SR./A-fib/received amio gtt and boluses   Lungs: clear to auscultation, decreased bases bilaterally  Abdomen:  + BM  Vascular:  pulses all palpable   Extremities: no edema   :  Voiding clear yellow  Lasix 20 mg IV TID  Chest Tube(s) & Incision(s):    Sternum:  Edges approximated, no drainage  Chest tube site: C/D/I     Left leg incision:  Edges well approximated, no drainage  Left upper leg hematoma less firm than yesterday, ace wrap on,  and tender to touch     Labs:   CBC:   Recent Labs     01/13/21  1600 01/15/21  0415   WBC  --  15.2*   HGB 8.1* 8.6*   HCT 23.5* 26.5*   PLT  --  377     BMP:   Recent Labs     01/15/21  0415   K  --    CREATININE  --    CALCIUM  --    MG 3.00*     Assessment/Plan:  As per CC:     Plan:   D/C mag ox - Mg 3.0  Decrease lasix to BID  D/C steroids after 12 noon dose  Consult EP regarding anticoagulation with afib/aflutter- see if can wait a few days due to left leg hematoma   Aggressive IS  Diuresis - strict I/O, fluid restriction   Wean oxygen  Ambulate - therapy on board, will need home PT, shower chair, rolling walker, raised toilet seat per therapy recommendations     Disp:  Plan for home with [facility] home care       Electronically signed by ALEXANDER Page - CNP on 1/15/2021 at 10:10 AM

## 2021-01-18 NOTE — TELEPHONE ENCOUNTER
----- Message from Favio Cortés MD sent at 1/15/2021  2:19 PM EST -----  Please schedule the patient for an office visit with me in the next few weeks.      Favio Cortés MD, MPH  [facility]   Office: [phone]
Has appt w RMM 2/2/21
Please offer 215 or 330 on 2/2/2021, hsp f/u
Pared With?: curette
Number Of Freeze-Thaw Cycles: 2 freeze-thaw cycles
Render Post-Care Instructions In Note?: yes
Include Z78.9 (Other Specified Conditions Influencing Health Status) As An Associated Diagnosis?: No
Post-Care Instructions: I reviewed with the patient in detail post-care instructions. Patient is to wear sunprotection, and avoid picking at any of the treated lesions. Pt may apply Vaseline to crusted or scabbing areas.
Consent: The patient's consent was obtained including but not limited to risks of crusting, scabbing, blistering, scarring, darker or lighter pigmentary change, recurrence, pain, incomplete removal and infection.
Detail Level: Detailed
Medical Necessity Information: It is in your best interest to select a reason for this procedure from the list below. All of these items fulfill various CMS LCD requirements except the new and changing color options.
Medical Necessity Clause: This procedure was medically necessary because the lesions that were treated were:

## 2021-01-19 DIAGNOSIS — Z95.1 S/P CABG X 5: Primary | ICD-10-CM

## 2021-01-19 DIAGNOSIS — Z98.890 S/P THORACENTESIS: ICD-10-CM

## 2021-01-19 DIAGNOSIS — I48.19 ATRIAL FIBRILLATION, PERSISTENT (HCC): ICD-10-CM

## 2021-01-19 DIAGNOSIS — H91.93 BILATERAL HEARING LOSS, UNSPECIFIED HEARING LOSS TYPE: ICD-10-CM

## 2021-01-19 DIAGNOSIS — D64.9 ANEMIA, UNSPECIFIED TYPE: ICD-10-CM

## 2021-01-19 NOTE — PROGRESS NOTES
Left detailed message for pt - per dr rodriguez, CXR prior to OV Jan 27. Advised pt of time , location and date of OV w/instructions to have CXR prior in radiology dept in office bldg. Requested call back to confirm.

## 2021-01-20 ENCOUNTER — HOSPITAL ENCOUNTER (OUTPATIENT)
Dept: GENERAL RADIOLOGY | Age: 66
Discharge: HOME | End: 2021-01-20
Payer: MEDICAID

## 2021-01-20 ENCOUNTER — HOSPITAL ENCOUNTER (OUTPATIENT)
Age: 66
Discharge: HOME | End: 2021-01-20
Payer: MEDICAID

## 2021-01-20 ENCOUNTER — OFFICE VISIT (OUTPATIENT)
Dept: CARDIOTHORACIC SURGERY | Age: 66
End: 2021-01-20

## 2021-01-20 VITALS
OXYGEN SATURATION: 93 % | HEART RATE: 90 BPM | SYSTOLIC BLOOD PRESSURE: 122 MMHG | WEIGHT: 195 LBS | HEIGHT: 67 IN | BODY MASS INDEX: 30.61 KG/M2 | TEMPERATURE: 98.3 F | RESPIRATION RATE: 16 BRPM | DIASTOLIC BLOOD PRESSURE: 68 MMHG

## 2021-01-20 DIAGNOSIS — Z95.1 S/P CABG X 5: ICD-10-CM

## 2021-01-20 DIAGNOSIS — H91.93 BILATERAL HEARING LOSS, UNSPECIFIED HEARING LOSS TYPE: ICD-10-CM

## 2021-01-20 DIAGNOSIS — Z98.890 S/P THORACENTESIS: ICD-10-CM

## 2021-01-20 DIAGNOSIS — D64.9 ANEMIA, UNSPECIFIED TYPE: ICD-10-CM

## 2021-01-20 DIAGNOSIS — I48.19 ATRIAL FIBRILLATION, PERSISTENT (HCC): ICD-10-CM

## 2021-01-20 DIAGNOSIS — J90 PLEURAL EFFUSION: Primary | ICD-10-CM

## 2021-01-20 PROCEDURE — 71046 X-RAY EXAM CHEST 2 VIEWS: CPT

## 2021-01-20 PROCEDURE — 99024 POSTOP FOLLOW-UP VISIT: CPT | Performed by: THORACIC SURGERY (CARDIOTHORACIC VASCULAR SURGERY)

## 2021-01-20 NOTE — PROGRESS NOTES
Progress Note    CC:  1st Postoperative follow-up    S/P s/p urgent CABG x5, left atrial appendage exclusion on January 8, 2021, discharged home on January 15, 2021. Patient had a left thigh hematoma at the site of the vein harvest and is in a pleural effusion that was tapped under ultrasound guidance on the left side. Subjective:   He states that he feels slightly better overall since he left the hospital.  He still complains of some mild shortness of breath upon exertion and some nonproductive cough. His main complaint though is his left thigh tightness and discomfort from the known left thigh hematoma. She is not Ace wrap in the leg as instructed upon discharge. She otherwise denies any palpitations, syncope, presyncope, orthopnea, paroxysmal nocturnal dyspnea, fevers, chills, drainage from his incisions or any other constitutional symptoms. Vital Signs:                                                 /68 (Site: Right Upper Arm, Position: Sitting)   Pulse 90   Temp 98.3 °F (36.8 °C)   Resp 16   Ht 5' 7\" (1.702 m)   Wt 195 lb (88.5 kg)   SpO2 93%   BMI 30.54 kg/m²        CV:   Regular rate and rhythm with no rubs or murmurs. Pulm: Clear lung fields with no rales or rhonchi. Incisions:    Sternotomy incision is clean, dry and intact. Sternum is stable. He does have diminished breath sounds along the left base. Abd:  Soft  Ext: Leg left thigh and upper calf hematoma along the left greater saphenous vein harvest tunnel. Its slightly tender to palpation more so posteriorly along the thigh but is otherwise relatively soft. Incision is clean, dry and intact. No peripheral edema. Chest x-ray from today shows persistent moderate pleural effusion despite recent drainage under ultrasound guidance which was however only 100 cc. Assessment/Plan:  Overall doing fair postop CABG x5 on January 8, 2021. He is here with his wife. For his left thigh hematoma I instructed them to use an Ace wrap from the ankle to the groin 24/7 and only take it down twice a day for 20 minutes to apply warm compress or a heating pad. Because of his mild shortness of breath and reactive cough and moderate persistent left effusion I will set him up for a repeat ultrasound-guided thoracentesis of his left effusion by IR as an outpatient. I hope this time around they will be able to drain more substantial amount. I explained to him that if the fusion reoccurs after this he will need to be readmitted to the hospital for chest tube placement for several days to completely evacuate this effusion with possible TPA infusion as needed. I instructed him to use he needs his incentive spirometer on a regular basis for the next couple of weeks. We discussed secondary risk prevention for cardiovascular disease. I gave the patient a copy of our protocol for the secondary risk prevention of cardiovascular disease. I instructed him to keep his appointment with Dr. Stacey Nicole from electrophysiology for his perioperative A. fib and I explained to him that due to his left thigh hematoma and the need for thoracentesis we will hold off starting him on any anticoagulation for the time being. The patient may increase activities as he feels comfortable doing so. I stressed to him to follow religiously the sternal precautions for the next 6 weeks. Follow-up with Dr. Shonna Jasmine and Dr. Malgorzata Woodward as prescribed. Follow-up:  With me in one month    Hoang York MD  1/20/2021  4:42 PM

## 2021-01-20 NOTE — PATIENT INSTRUCTIONS
Patient Education        Thoracentesis: Before Your Procedure  What is thoracentesis? Thoracentesis (say \"wckr-wr-bdv-GRETA-sis\") is a procedure to remove fluid from the space between the lungs and the chest wall. This is called the pleural space. The procedure may also be called a \"chest tap. \"  It is normal to have a small amount of fluid in the pleural space. But too much fluid can build up because of problems such as infection, heart failure, and lung cancer. The procedure may be done to help with shortness of breath and pain caused by the fluid buildup. Or you may have it done so the doctor can test the fluid to find the cause of the buildup. Your doctor will put a long, thin needle or a thin plastic tube, called a catheter, between two of your ribs. The doctor will use the needle or catheter to take fluid out. You may get medicine before the procedure. This helps with pain and helps you relax. The procedure will take about 15 minutes. Most people go home shortly after. You can go back to work or your normal activities as soon as you feel up to it. If the doctor sends the fluid to a lab for testing, it usually takes a few hours to get the results. Some of the test results may take a few days. The doctor or nurse will discuss the results with you. Follow-up care is a key part of your treatment and safety. Be sure to make and go to all appointments, and call your doctor if you are having problems. It's also a good idea to know your test results and keep a list of the medicines you take. How do you prepare for the procedure? Procedures can be stressful. This information will help you understand what you can expect. And it will help you safely prepare for your procedure. Preparing for the procedure    · Be sure you have someone to take you home. Anesthesia and pain medicine will make it unsafe for you to drive or get home on your own.   · Rest when you feel tired. Getting enough sleep will help you recover.     · Avoid strenuous activities, such as bicycle riding, jogging, weight lifting, or aerobic exercise, until your doctor says it is okay.     · You may shower. Do not take a bath until the puncture site has healed, or until your doctor tells you it is okay.     · Ask your doctor when you can drive again.     · You may need to take 1 or 2 days off from work. It depends on the type of work you do and how you feel. Diet    · You can eat your normal diet.     · Drink plenty of fluids (unless your doctor tells you not to). Medicines    · Your doctor will tell you if and when you can restart your medicines. He or she will also give you instructions about taking any new medicines.     · If you take aspirin or some other blood thinner, ask your doctor if and when to start taking it again. Make sure that you understand exactly what your doctor wants you to do.     · Be safe with medicines. Take pain medicines exactly as directed. ? If the doctor gave you a prescription medicine for pain, take it as prescribed. ? If you are not taking a prescription pain medicine, ask your doctor if you can take an over-the-counter medicine. ? Do not take two or more pain medicines at the same time unless the doctor told you to. Many pain medicines have acetaminophen, which is Tylenol. Too much acetaminophen (Tylenol) can be harmful.     · If you think your pain medicine is making you sick to your stomach:  ? Take your medicine after meals (unless your doctor has told you not to). ? Ask your doctor for a different pain medicine.     · If your doctor prescribed antibiotics, take them as directed. Do not stop taking them just because you feel better. You need to take the full course of antibiotics.    Care of the puncture site   · Wash the area daily with warm, soapy water, and pat it dry. Don't use hydrogen peroxide or alcohol, which may delay healing. You may cover the area with a gauze bandage if it weeps or rubs against clothing. Change the bandage every day.     · Keep the area clean and dry. Follow-up care is a key part of your treatment and safety. Be sure to make and go to all appointments, and call your doctor if you are having problems. It's also a good idea to know your test results and keep a list of the medicines you take. When should you call for help? Call 911 anytime you think you may need emergency care. For example, call if:    · You passed out (lost consciousness).     · You have severe trouble breathing.     · You have sudden chest pain and shortness of breath, or you cough up blood. Call your doctor now or seek immediate medical care if:    · You have shortness of breath that is new or getting worse.     · You have new or worse pain in your chest, especially when you take a deep breath.     · You are sick to your stomach or cannot keep fluids down.     · You have a fever over 100°F.     · Bright red blood has soaked through the bandage over your puncture site.     · You have signs of infection, such as:  ? Increased pain, swelling, warmth, or redness. ? Red streaks leading from the puncture site. ? Pus draining from the puncture site. ? Swollen lymph nodes in your neck, armpits, or groin. ? A fever.     · You cough up a lot more mucus than normal, or your mucus changes color. Watch closely for changes in your health, and be sure to contact your doctor if you have any problems. Where can you learn more? Go to https://[employer]. org and sign in to your [employer] account. Enter J042 in the [employer] box to learn more about \"Thoracentesis: What to Expect at Home. \"     If you do not have an account, please click on the \"Sign Up Now\" link. Current as of: February 24, 2020               Content Version: 12.6  © 3902-2952 [employer]. Care instructions adapted under license by [facility] [place] ([facility]). If you have questions about a medical condition or this instruction, always ask your healthcare professional. [address] any warranty or liability for your use of this information.

## 2021-01-21 ENCOUNTER — TELEPHONE (OUTPATIENT)
Dept: CARDIOTHORACIC SURGERY | Age: 66
End: 2021-01-21

## 2021-01-21 ENCOUNTER — PRE-PROCEDURE TELEPHONE (OUTPATIENT)
Dept: INTERVENTIONAL RADIOLOGY/VASCULAR | Age: 66
End: 2021-01-21

## 2021-01-22 ENCOUNTER — HOSPITAL ENCOUNTER (OUTPATIENT)
Dept: CT IMAGING | Age: 66
Discharge: HOME | End: 2021-01-22
Payer: MEDICAID

## 2021-01-22 ENCOUNTER — HOSPITAL ENCOUNTER (OUTPATIENT)
Age: 66
Discharge: HOME | End: 2021-01-22
Payer: MEDICAID

## 2021-01-22 ENCOUNTER — HOSPITAL ENCOUNTER (OUTPATIENT)
Dept: NON INVASIVE DIAGNOSTICS | Age: 66
Discharge: HOME | End: 2021-01-22
Payer: MEDICAID

## 2021-01-22 ENCOUNTER — HOSPITAL ENCOUNTER (OUTPATIENT)
Dept: INTERVENTIONAL RADIOLOGY/VASCULAR | Age: 66
Discharge: HOME | End: 2021-01-22
Payer: MEDICAID

## 2021-01-22 ENCOUNTER — HOSPITAL ENCOUNTER (OUTPATIENT)
Dept: GENERAL RADIOLOGY | Age: 66
Discharge: HOME | End: 2021-01-22
Payer: MEDICAID

## 2021-01-22 VITALS
TEMPERATURE: 98.2 F | HEART RATE: 81 BPM | DIASTOLIC BLOOD PRESSURE: 71 MMHG | OXYGEN SATURATION: 100 % | SYSTOLIC BLOOD PRESSURE: 156 MMHG | RESPIRATION RATE: 20 BRPM

## 2021-01-22 DIAGNOSIS — J90 PLEURAL EFFUSION: Primary | ICD-10-CM

## 2021-01-22 DIAGNOSIS — Z95.1 S/P CABG (CORONARY ARTERY BYPASS GRAFT): ICD-10-CM

## 2021-01-22 DIAGNOSIS — I48.19 ATRIAL FIBRILLATION, PERSISTENT (HCC): ICD-10-CM

## 2021-01-22 DIAGNOSIS — Z98.890 S/P THORACENTESIS: ICD-10-CM

## 2021-01-22 DIAGNOSIS — I31.39 PERICARDIAL EFFUSION: ICD-10-CM

## 2021-01-22 DIAGNOSIS — J90 PLEURAL EFFUSION: ICD-10-CM

## 2021-01-22 DIAGNOSIS — Z95.1 S/P CABG X 5: ICD-10-CM

## 2021-01-22 DIAGNOSIS — I31.39 PERICARDIAL EFFUSION: Primary | ICD-10-CM

## 2021-01-22 DIAGNOSIS — J90 PLEURAL EFFUSION, LEFT: ICD-10-CM

## 2021-01-22 DIAGNOSIS — Z98.890 S/P THORACENTESIS: Primary | ICD-10-CM

## 2021-01-22 LAB
ANION GAP SERPL CALCULATED.3IONS-SCNC: 8 MMOL/L (ref 3–16)
APTT: 29.9 SEC (ref 24.2–36.2)
BASOPHILS ABSOLUTE: 0.1 K/UL (ref 0–0.2)
BASOPHILS RELATIVE PERCENT: 0.5 %
BUN BLDV-MCNC: 13 MG/DL (ref 7–20)
CALCIUM SERPL-MCNC: 8.9 MG/DL (ref 8.3–10.6)
CHLORIDE BLD-SCNC: 106 MMOL/L (ref 99–110)
CO2: 23 MMOL/L (ref 21–32)
CREAT SERPL-MCNC: 1 MG/DL (ref 0.8–1.3)
EOSINOPHILS ABSOLUTE: 0.1 K/UL (ref 0–0.6)
EOSINOPHILS RELATIVE PERCENT: 0.7 %
GFR AFRICAN AMERICAN: >60
GFR NON-AFRICAN AMERICAN: >60
GLUCOSE BLD-MCNC: 118 MG/DL (ref 70–99)
HCT VFR BLD CALC: 29.3 % (ref 40.5–52.5)
HEMOGLOBIN: 9.9 G/DL (ref 13.5–17.5)
INR BLD: 1.26 (ref 0.86–1.14)
LYMPHOCYTES ABSOLUTE: 1.6 K/UL (ref 1–5.1)
LYMPHOCYTES RELATIVE PERCENT: 14.9 %
MCH RBC QN AUTO: 31.5 PG (ref 26–34)
MCHC RBC AUTO-ENTMCNC: 33.8 G/DL (ref 31–36)
MCV RBC AUTO: 93.2 FL (ref 80–100)
MONOCYTES ABSOLUTE: 0.9 K/UL (ref 0–1.3)
MONOCYTES RELATIVE PERCENT: 8.6 %
NEUTROPHILS ABSOLUTE: 8.3 K/UL (ref 1.7–7.7)
NEUTROPHILS RELATIVE PERCENT: 75.3 %
PDW BLD-RTO: 15.7 % (ref 12.4–15.4)
PLATELET # BLD: 629 K/UL (ref 135–450)
PMV BLD AUTO: 6.9 FL (ref 5–10.5)
POTASSIUM SERPL-SCNC: 5.4 MMOL/L (ref 3.5–5.1)
PROTHROMBIN TIME: 14.6 SEC (ref 10–13.2)
RBC # BLD: 3.14 M/UL (ref 4.2–5.9)
SODIUM BLD-SCNC: 137 MMOL/L (ref 136–145)
WBC # BLD: 11 K/UL (ref 4–11)

## 2021-01-22 PROCEDURE — 85025 COMPLETE CBC W/AUTO DIFF WBC: CPT

## 2021-01-22 PROCEDURE — 36415 COLL VENOUS BLD VENIPUNCTURE: CPT

## 2021-01-22 PROCEDURE — 71250 CT THORAX DX C-: CPT

## 2021-01-22 PROCEDURE — 93308 TTE F-UP OR LMTD: CPT

## 2021-01-22 PROCEDURE — 85610 PROTHROMBIN TIME: CPT

## 2021-01-22 PROCEDURE — 85730 THROMBOPLASTIN TIME PARTIAL: CPT

## 2021-01-22 PROCEDURE — 7100000011 HC PHASE II RECOVERY - ADDTL 15 MIN

## 2021-01-22 PROCEDURE — 7100000010 HC PHASE II RECOVERY - FIRST 15 MIN

## 2021-01-22 PROCEDURE — 71045 X-RAY EXAM CHEST 1 VIEW: CPT

## 2021-01-22 PROCEDURE — 80048 BASIC METABOLIC PNL TOTAL CA: CPT

## 2021-01-22 PROCEDURE — C1729 CATH, DRAINAGE: HCPCS

## 2021-01-22 PROCEDURE — 32555 ASPIRATE PLEURA W/ IMAGING: CPT

## 2021-01-22 RX ORDER — METHYLPREDNISOLONE 4 MG/1
4 TABLET ORAL SEE ADMIN INSTRUCTIONS
Qty: 21 TABLET | Refills: 0 | OUTPATIENT
Start: 2021-01-22 | End: 2021-01-27

## 2021-01-22 RX ORDER — FUROSEMIDE 20 MG/1
10 TABLET ORAL DAILY
Qty: 5 TABLET | Refills: 0 | OUTPATIENT
Start: 2021-01-22 | End: 2021-03-15

## 2021-01-22 RX ORDER — AMLODIPINE BESYLATE 2.5 MG/1
2.5 TABLET ORAL DAILY
Qty: 30 TABLET | Refills: 1 | OUTPATIENT
Start: 2021-01-22 | End: 2021-04-14 | Stop reason: SDUPTHER

## 2021-01-22 NOTE — PROGRESS NOTES
PATIENT ARRIVED TO PREOP AREA. Vss. PATIENT VERY SOB. PATIENT TRANSFERRED TO WHEEL CHAIR FROM WALKER USE AND WHEELED TO PREOP AREA.  SPAO2 98% ON ROOM AIR

## 2021-01-22 NOTE — PROGRESS NOTES
200ml of fluid removed  Spoke to patients CARI mukherjee and advised her the amount of fluid removed and that patient was returning to the floor.

## 2021-01-22 NOTE — PROGRESS NOTES
Per dr rodriguez, stat limited echo - ck function and pericardial effusion , s/p CT Chest, Thoracentesis.

## 2021-01-22 NOTE — PROGRESS NOTES
Pt arrived from CT in stable condition. VSS. Thoracentesis site with scant amount of drainage. Pt denies any pain at site. Will continue to monitor.

## 2021-01-22 NOTE — PROGRESS NOTES
Discharge instructions reviewed and understanding verbalized per pt/family with copy given. All home medications/new prescriptions have been reviewed, questions answered and patient/family state understanding.

## 2021-01-22 NOTE — PROGRESS NOTES
Per dr rodriguez:  - schedule OV 27Jan  - start 10 mg lasix once daily until seen in office 27Jan  - solumedrol dose preston, 4 mg tablets (starting dose 24 mg)  - start norvasc 2.5 mg once daily   - stop lisinopril     Spoke w/wife at length; states daughter at pharmacy now picking up medications. Reviewed dosing of all as noted above; medrol dose preston dosing regimen reviewed at length. Advised to call with any questions. Aware to stop lisinopril. Aware of date, time and location of OV 27Jan. Verb understanding.

## 2021-01-25 NOTE — PROGRESS NOTES
Patients chart was reviewed post Thoracentesisis procedure. No complications were noted post procedure.

## 2021-01-26 ENCOUNTER — TELEPHONE (OUTPATIENT)
Dept: CARDIOTHORACIC SURGERY | Age: 66
End: 2021-01-26

## 2021-01-26 DIAGNOSIS — Z98.890 S/P THORACENTESIS: ICD-10-CM

## 2021-01-26 DIAGNOSIS — J90 PLEURAL EFFUSION: ICD-10-CM

## 2021-01-26 DIAGNOSIS — Z95.1 S/P CABG (CORONARY ARTERY BYPASS GRAFT): ICD-10-CM

## 2021-01-26 DIAGNOSIS — I48.19 ATRIAL FIBRILLATION, PERSISTENT (HCC): ICD-10-CM

## 2021-01-26 DIAGNOSIS — I31.39 PERICARDIAL EFFUSION: Primary | ICD-10-CM

## 2021-01-26 NOTE — TELEPHONE ENCOUNTER
Spoke w/wife; states pt 'is doing much better; the swelling in his legs is all gone and he can walk without a walker'. 'cough is still there but improved'. + IS  Aware to have CXR prior to OV 27Jan at 3pm; verb understanding of all.

## 2021-01-27 ENCOUNTER — HOSPITAL ENCOUNTER (OUTPATIENT)
Age: 66
Discharge: HOME | End: 2021-01-27
Payer: MEDICAID

## 2021-01-27 ENCOUNTER — HOSPITAL ENCOUNTER (OUTPATIENT)
Dept: GENERAL RADIOLOGY | Age: 66
Discharge: HOME | End: 2021-01-27
Payer: MEDICAID

## 2021-01-27 ENCOUNTER — OFFICE VISIT (OUTPATIENT)
Dept: CARDIOTHORACIC SURGERY | Age: 66
End: 2021-01-27

## 2021-01-27 VITALS
WEIGHT: 183 LBS | BODY MASS INDEX: 28.72 KG/M2 | RESPIRATION RATE: 16 BRPM | HEIGHT: 67 IN | TEMPERATURE: 97.2 F | DIASTOLIC BLOOD PRESSURE: 72 MMHG | SYSTOLIC BLOOD PRESSURE: 118 MMHG | OXYGEN SATURATION: 97 % | HEART RATE: 80 BPM

## 2021-01-27 DIAGNOSIS — Z98.890 S/P THORACENTESIS: ICD-10-CM

## 2021-01-27 DIAGNOSIS — J90 PLEURAL EFFUSION: ICD-10-CM

## 2021-01-27 DIAGNOSIS — I31.39 PERICARDIAL EFFUSION: ICD-10-CM

## 2021-01-27 DIAGNOSIS — Z95.1 S/P CABG (CORONARY ARTERY BYPASS GRAFT): ICD-10-CM

## 2021-01-27 DIAGNOSIS — Z95.1 S/P CABG (CORONARY ARTERY BYPASS GRAFT): Primary | ICD-10-CM

## 2021-01-27 DIAGNOSIS — I48.19 ATRIAL FIBRILLATION, PERSISTENT (HCC): ICD-10-CM

## 2021-01-27 PROBLEM — I97.89 POSTOPERATIVE ATRIAL FIBRILLATION (HCC): Status: ACTIVE | Noted: 2021-01-27

## 2021-01-27 PROBLEM — I48.91 POSTOPERATIVE ATRIAL FIBRILLATION (HCC): Status: ACTIVE | Noted: 2021-01-27

## 2021-01-27 PROBLEM — H93.A1 PULSATILE TINNITUS OF RIGHT EAR: Status: RESOLVED | Noted: 2017-05-02 | Resolved: 2021-01-27

## 2021-01-27 PROCEDURE — 71046 X-RAY EXAM CHEST 2 VIEWS: CPT

## 2021-01-27 PROCEDURE — 99024 POSTOP FOLLOW-UP VISIT: CPT | Performed by: THORACIC SURGERY (CARDIOTHORACIC VASCULAR SURGERY)

## 2021-01-27 RX ORDER — FUROSEMIDE 20 MG/1
10 TABLET ORAL DAILY
Qty: 4 TABLET | Refills: 0 | Status: SHIPPED | OUTPATIENT
Start: 2021-01-27 | End: 2021-02-24

## 2021-01-27 NOTE — PROGRESS NOTES
[address]   Electrophysiology Consultation   Date: 2/2/2021  Reason for Consultation: Post CABG atrial fib/flutter  Consult Requesting Physician: Norma Blackman MD       CC: Post op atrial fibrillation  HPI: Gavin Green is a 72 y.o. male  History of HLD, HTN, New onset angina 12/14/2020. [facility] 01/07/2021 with severe multi vessel CAD,     CABG x 5, TORREY 01/08/2021. Noted to have post operative atrial fibrillation and atrial flutter that was treated with PO and IV amiodarone. Chest x ray showed persistent moderate left Pleural effusion Thoracentesis, 200 ml fluid removed. He was not started on anticoagulation therapy since he had a left-sided hematoma. He has been discharged and history of for further evaluation. Shahzad Ledezma presents today to follow up regarding alanis operative Atrial fibrillation. States his leg is healing up. Denies any palpitation, chest pain or shortness of breath. Assessment and plan:     Paroxysmal atrial fibrillation   Occurred with CABG and TORREY 1/08/2021   Patient is now in sinus rhythm    2/2/2021 ECG with sinus rhythm     On Amiodarone, 200 mg/day. ALT/AST 01/07/2021 30/21,TSH 01/15/2019 0.86   Metoprolol tartrate 50 mg BID     30 days Holter to assess AF burden    S/p TORREY ligation. Can consider GRETA if recurrent Afib to assess TORREY and need for anticoagulation. Coronary artery Disease    New onset angina 12/14/2020. [facility] 01/07/2020 with Severe Multivessel CAD. S/P CABG x 5 with TORREY 01/08/2020    Dr Alejandra Rebolledo- appt 02/24/2021. Follows with Dr. Norma Garces    On ASA, statin, BB    HTN  Vitals:    02/02/21 1416   BP: 117/79   Pulse: 75   SpO2: 100%      -Home BP monitoring encourage with a BP goal <130/80   Norvasc 2.5 mg daily    HLD: on statin.        Patient Active Problem List    Diagnosis Date Noted    Postoperative atrial fibrillation ([facility] [address] 01/27/2021    Coronary artery disease involving native heart     Abnormal coronary angiogram  Hyperlipidemia 12/21/2020    Dizziness 12/21/2020    Diverticulosis of large intestine without hemorrhage     Bilateral high frequency sensorineural hearing loss 01/26/2018    Hordeolum externum of right upper eyelid 01/30/2017    Right-sided tinnitus 01/30/2017    Bilateral chronic serous otitis media 01/30/2017    Chest pain 11/30/2011     Diagnostic studies:   2/1/2021: ECG with sinus rhythm     ECHO 01/22/2021  Summary   -Normal left ventricle size, wall thickness, and systolic function with an   estimated ejection fraction of 55%.   -No regional wall motion abnormalities are seen. -There is mild-to-moderate tricuspid regurgitation with a RVSP estimation of   33 mmHg. -There is mild circumferential pericardial effusion noted. CT Chest w/o contrast 01/22/2021  Small pleural effusions slightly greater on the left.       Patchy consolidation left lower lobe and lingula likely representing   atelectasis due to volume loss.       Prevascular lymph nodes are mildly enlarged measuring up to 1.8 cm in short   axis.  Findings likely reactive.  Recommend follow-up CT of the chest in 6   months.       Small to moderate pericardial effusion       Bilateral carotid Duplex 01/07/2021  Summary        No hemodynamically significant carotid stenosis noted on either side.    Vertebral flow are antegrade bilaterally.           ECHO: 1/5/2021  -Normal left ventricle size, wall thickness and systolic function with an   estimated ejection fraction of 55%.  -Shushan is not well visualized.   -E/e\"= 11.6 .   -Normal diastolic function.   - Mild mitral regurgitation.   -Mild tricuspid regurgitation.   -Pasp 31mmHg.     CTA CARDIAC W CONTRAST: 1/5/2021     Moderate if not severe stenosis involving the proximal LAD with predominantly noncalcified or soft and potentially vulnerable plaque. There is calcified plaque with mild-to-moderate narrowing proximally just  beyond the LAD separate origin from the left coronary cusp. Separate circumflex origin from the left coronary cusp adjacent to the LAD. Mild-to-moderate stenosis of the circumflex near the marginal bifurcation. Mild-to-moderate narrowing right coronary artery at the origin with multifocal plaques. Right coronary artery is less well demonstrated due to phase misregistration in motion. Codominant right and left, distal right coronary and LAD. GXT 02/17/2020   Conclusions      Summary   Normal stress test.       NM stress 11/28/2011  IMPRESSION-         Normal cardiac examination.           I independently reviewed the cardiac diagnostic studies, ECG and relevant imaging studies. Lab Results   Component Value Date    LVEF 65 01/07/2021    LVEFMODE Cardiac Cath 01/07/2021     Lab Results   Component Value Date    TSH 0.86 01/15/2019       Physical Examination:  Vitals:    02/02/21 1416   BP: 117/79   Pulse: 75   SpO2: 100%      Wt Readings from Last 3 Encounters:   02/02/21 185 lb (83.9 kg)   01/27/21 183 lb (83 kg)   01/20/21 195 lb (88.5 kg)       · Constitutional: Oriented. No distress. · Head: Normocephalic and atraumatic. · Mouth/Throat: Oropharynx is clear and moist.   · Eyes: Conjunctivae normal. EOM are normal.   · Neck: Neck supple. No JVD present. · Cardiovascular: Normal rate, regular rhythm, S1&S2. · Pulmonary/Chest: Bilateral respiratory sounds. No rhonchi. · Abdominal: Soft. No tenderness. · Musculoskeletal: No tenderness. No edema    · Lymphadenopathy: Has no cervical adenopathy. · Neurological: Alert and oriented. Follows command, No Gross deficit   · Skin: Skin is warm, No rash noted. · Psychiatric: Has a normal behavior       Review of System:  [x] Full ROS obtained and negative except as mentioned in HPI    Prior to Admission medications    Medication Sig Start Date End Date Taking?  Authorizing Provider   furosemide (LASIX) 20 MG tablet Take 0.5 tablets by mouth daily for 7 doses 1/27/21 2/3/21 Yes Heather York MD amLODIPine (NORVASC) 2.5 MG tablet Take 1 tablet by mouth daily 1/22/21  Yes Hoang York MD   furosemide (LASIX) 20 MG tablet Take 0.5 tablets by mouth daily 1/22/21  Yes Hoang York MD   aspirin 325 MG EC tablet Take 1 tablet by mouth daily 1/16/21  Yes Mellie Sever, APRN - CNP   amiodarone (CORDARONE) 200 MG tablet Take 2 tablets by mouth 2 times daily for 7 days, THEN 1 tablet 2 times daily for 7 days, THEN 1 tablet daily for 14 days. 1/15/21 2/12/21 Yes Mellie Sever, APRN - CNP   atorvastatin (LIPITOR) 40 MG tablet Take 1 tablet by mouth nightly 1/15/21  Yes Mellie Sever, APRN - CNP   sennosides-docusate sodium (SENOKOT-S) 8.6-50 MG tablet Take 1 tablet by mouth 2 times daily 1/15/21  Yes Mellie Sever, APRN - CNP   pantoprazole (PROTONIX) 40 MG tablet Take 1 tablet by mouth daily 1/16/21  Yes Mellie Sever, APRN - CNP   metoprolol tartrate (LOPRESSOR) 50 MG tablet Take 1 tablet by mouth 2 times daily 1/15/21  Yes Mellie Sever, APRN - CNP   meloxicam [facility]) 15 MG tablet Take 1 tablet by mouth daily 1/4/21  Yes Mu Vaughn MD   latanoprost (XALATAN) 0.005 % ophthalmic solution  1/6/20  Yes Historical Provider, MD   acetaminophen (TYLENOL) 500 MG tablet Take 2 tablets by mouth every 6 hours 1/15/21   Mellie Sever, APRN - CNP   gabapentin (NEURONTIN) 100 MG capsule Take 1 capsule by mouth 3 times daily for 7 days.  1/15/21 1/22/21  Mellie Sever, APRN - CNP       Past Medical History:   Diagnosis Date    Coronary artery disease involving native heart     Hyperlipidemia 12/21/2020        Past Surgical History:   Procedure Laterality Date    COLONOSCOPY  05/04/2018    CORONARY ARTERY BYPASS GRAFT N/A 1/8/2021 URGENT CABG X5, CORONARY ARTERY BYPASS COOK TO LAD, VEIN GRAFT TO RPDA, SEQUENTAIL VEIN GRAFT TO D3, D2 AND PL BRANCH CIRCUMFLEX, LIGATION LEFT ATRIAL APPENDAGE 40MM ATRICLIP, EVH LEFT LEG, ENDOSCOPICALLY VEIN HARVEST OF LEFT SAPHENOUS VEIN, GRETA AORTIC ULTRASOUND, DOPPLER VERIFICATION OF FLOW TO CONFIRM BYPASS GRAFTS, INSERTION OF VENTRICULAR PACING WIRES, BILATERAL INCOSTAL NEVRE BLOCK 5TH LEVEL W       No Known Allergies    Social History:  Reviewed. reports that he has never smoked. He has never used smokeless tobacco. He reports that he does not drink alcohol or use drugs. Family History:  Reviewed. Reviewed. No family history of SCD. Relevant and available labs, and cardiovascular diagnostics reviewed. Reviewed. I independently reviewed all cardiac tracing. I independently reviewed relevant and available cardiac diagnostic tests ECG, CXR, Echo, Stress test, Device interrogation, Holter, CT scan. Complex medical condition with multiple medical problems affecting prognosis and outcome of EP interventions    All questions and concerns were addressed to the patient/family. Alternatives to my treatment were discussed. I have discussed the above stated plan and the patient verbalized understanding and agreed with the plan. NOTE: This report was transcribed using voice recognition software. Every effort was made to ensure accuracy, however, inadvertent computerized transcription errors may be present. Melisa Qureshi MD, MPH  [facility]   Office: [phone]  Fax: [phone] attestation: This note was scribed in the presence of Melisa Qureshi MD by Benradette Al RN  Physician Attestation: I, Dr. Melisa Qureshi, confirm that the scribe's documentation has been prepared under my direction and personally reviewed by me in its entirety. I also confirm that the note above accurately reflects all work, treatment, procedures, and medical decision making performed by me.

## 2021-01-27 NOTE — PROGRESS NOTES
Today he states that he feels significantly better overall in regards with his breathing and his cough. He usually does not get any shortness of breath when he walks on a flat terrain but he does admit that he gets some shortness of breath when he goes up and down stairs. He also mentions that having to wear a mask over the last few months has been difficult for him in regards with his breathing pattern. In regards with his left thigh hematoma it appears today significantly improved in terms of the swelling and it is much less tender on palpation. He otherwise denies any palpitations, syncope, presyncope, orthopnea, paroxysmal nocturnal dyspnea, fevers, chills, drainage from his incisions or any other constitutional symptoms. Vital Signs:                                                 /72 (Site: Right Upper Arm, Position: Sitting)   Pulse 80   Temp 97.2 °F (36.2 °C)   Resp 16   Ht 5' 7\" (1.702 m)   Wt 183 lb (83 kg)   SpO2 97%   BMI 28.66 kg/m²        CV:   Regular rate and rhythm with no rubs or murmurs. Pulm: Clear lung fields with no rales or rhonchi. Incisions:    Sternotomy incision is clean, dry and intact. Sternum is stable. He does have diminished breath sounds along the left base. Abd:  Soft  Ext: Leg left thigh and upper calf hematoma along the left greater saphenous vein harvest tunnel. Its slightly tender to palpation more so posteriorly along the thigh but is otherwise relatively soft. Incision is clean, dry and intact. No peripheral edema. Chest x-ray from today shows small to moderate moderate pleural effusion improved from the one this past Friday before his thoracentesis. Actually to me it looks rather on the small size. His lungs are otherwise well-expanded. Assessment/Plan:  Overall doing fair postop CABG x5 on January 8, 2021. He is doing better after the left-sided repeat thoracentesis for his recurrent left pleural effusion and his Medrol Dosepak for his mild to moderate pericardial effusion. I instructed him to keep his appointment with Dr. Ulices Fernando from electrophysiology for his perioperative A. fib and I explained to him that due to his left thigh hematoma ,will commend continuing holding off starting him on any anticoagulation for at least the next couple of weeks. During my exam today he appears to be in normal sinus rhythm. For his small recurrent left pleural effusion I will renew his Lasix prescription for 1 more week and we will contact him over the phone at that point to check on his status. The patient may increase activities as he feels comfortable doing so. I stressed to him to follow religiously the sternal precautions for the next 6 weeks. Follow-up with Dr. Asha Schuster and Dr. Oliva Brennan as prescribed. Follow-up:  With me in one month    Hoang York MD  1/27/2021  1:19 PM

## 2021-02-02 ENCOUNTER — OFFICE VISIT (OUTPATIENT)
Dept: CARDIOLOGY CLINIC | Age: 66
End: 2021-02-02
Payer: MEDICAID

## 2021-02-02 VITALS
BODY MASS INDEX: 29.03 KG/M2 | HEIGHT: 67 IN | WEIGHT: 185 LBS | HEART RATE: 75 BPM | OXYGEN SATURATION: 100 % | DIASTOLIC BLOOD PRESSURE: 79 MMHG | SYSTOLIC BLOOD PRESSURE: 117 MMHG

## 2021-02-02 DIAGNOSIS — R07.89 CHEST DISCOMFORT: ICD-10-CM

## 2021-02-02 DIAGNOSIS — I97.89 POSTOPERATIVE ATRIAL FIBRILLATION (HCC): ICD-10-CM

## 2021-02-02 DIAGNOSIS — I25.10 CORONARY ARTERY DISEASE INVOLVING NATIVE CORONARY ARTERY OF NATIVE HEART WITHOUT ANGINA PECTORIS: Primary | ICD-10-CM

## 2021-02-02 DIAGNOSIS — I48.91 POSTOPERATIVE ATRIAL FIBRILLATION (HCC): ICD-10-CM

## 2021-02-02 DIAGNOSIS — I48.91 ATRIAL FIBRILLATION, UNSPECIFIED TYPE (HCC): ICD-10-CM

## 2021-02-02 DIAGNOSIS — E78.5 HYPERLIPIDEMIA, UNSPECIFIED HYPERLIPIDEMIA TYPE: ICD-10-CM

## 2021-02-02 PROCEDURE — 99204 OFFICE O/P NEW MOD 45 MIN: CPT | Performed by: INTERNAL MEDICINE

## 2021-02-02 PROCEDURE — 93228 REMOTE 30 DAY ECG REV/REPORT: CPT | Performed by: INTERNAL MEDICINE

## 2021-02-02 PROCEDURE — 93000 ELECTROCARDIOGRAM COMPLETE: CPT | Performed by: INTERNAL MEDICINE

## 2021-02-09 ENCOUNTER — OFFICE VISIT (OUTPATIENT)
Dept: CARDIOLOGY CLINIC | Age: 66
End: 2021-02-09
Payer: MEDICAID

## 2021-02-09 VITALS
SYSTOLIC BLOOD PRESSURE: 98 MMHG | HEIGHT: 67 IN | WEIGHT: 183 LBS | DIASTOLIC BLOOD PRESSURE: 64 MMHG | BODY MASS INDEX: 28.72 KG/M2 | OXYGEN SATURATION: 96 % | HEART RATE: 76 BPM

## 2021-02-09 DIAGNOSIS — E78.5 DYSLIPIDEMIA: ICD-10-CM

## 2021-02-09 DIAGNOSIS — I25.10 CORONARY ARTERY DISEASE INVOLVING NATIVE CORONARY ARTERY OF NATIVE HEART WITHOUT ANGINA PECTORIS: Primary | ICD-10-CM

## 2021-02-09 DIAGNOSIS — I48.91 ATRIAL FIBRILLATION, UNSPECIFIED TYPE (HCC): ICD-10-CM

## 2021-02-09 PROCEDURE — 99214 OFFICE O/P EST MOD 30 MIN: CPT | Performed by: INTERNAL MEDICINE

## 2021-02-09 NOTE — LETTER
[facility] Cardiology - Oneta Boots  [address] [address]  Phone: [phone]  Fax: [address], DO        2021     Sulma Muhammad, [address] [address] [facility]    Patient: Jarrett Ventura  MR Number: [id number]  YOB: 1955  Date of Visit: 2021    Dear Dr. Sulma Muhammad:                                         2021    PATIENT: Jarrett Ventura  : 1955    Primary Care Provider:   Sulma Muhammad MD  Z:[phone]  f:[phone]    Reason for evaluation:   Chief Complaint   Patient presents with    Coronary Artery Disease    Follow-Up from Hospital     History of present illness:   Mr. Jarrett Ventura is a 72 y.o. male patient here in close cardiovascular follow up after recent five vessel CABG, complicated by recurrent pleural effusion leading to thoracentesis x2 post op. He additionally had post op atrial fibrillation and a hematoma at left calf vein graft site. Stat limited echo for sob and ct scan demonstrating mild-mod pericardial effusion. Echo revealed mild effusion- no tamponade. Lisinopril dced for elevated K. Dr. Maye Iraheta prescribed steroids and lasix burst and he was much improved in follow up 1/27. He has since been without shortness of breath, orthopnea, edema, palpitations, or chest pain. Sternal and left calf site are without open wounds and no recurrent bleeding issues. He states that occasionally he sees dried blood when blowing his nose. From an atrial fibrillation perspective, he has remained asymptomatic with this and on amiodarone, currently wearing an event monitor. He has follow-up with both cardiothoracic surgery and electrophysiology coming up. He completed home physical and occupational therapy yesterday and is interested in starting cardiac rehab. He states that blood pressures with all of the home visits and with his own checks have been controlled \"around 117/81\".      Medical History:      Diagnosis Date    Coronary artery disease involving native heart     Hyperlipidemia 12/21/2020     Surgical History:      Procedure Laterality Date    COLONOSCOPY  05/04/2018    CORONARY ARTERY BYPASS GRAFT N/A 1/8/2021    URGENT CABG X5, CORONARY ARTERY BYPASS COOK TO LAD, VEIN GRAFT TO RPDA, SEQUENTAIL VEIN GRAFT TO D3, D2 AND PL BRANCH CIRCUMFLEX, LIGATION LEFT ATRIAL APPENDAGE 40MM ATRICLIP, EVH LEFT LEG, ENDOSCOPICALLY VEIN HARVEST OF LEFT SAPHENOUS VEIN, GRETA AORTIC ULTRASOUND, DOPPLER VERIFICATION OF FLOW TO CONFIRM BYPASS GRAFTS, INSERTION OF VENTRICULAR PACING WIRES, BILATERAL INCOSTAL NEVRE BLOCK 5TH LEVEL W     Social History:  Social History     Socioeconomic History    Marital status:      Spouse name: Not on file    Number of children: Not on file    Years of education: Not on file    Highest education level: Not on file   Occupational History    Not on file   Social Needs    Financial resource strain: Not on file  Food insecurity     Worry: Not on file     Inability: Not on file    Transportation needs     Medical: Not on file     Non-medical: Not on file   Tobacco Use    Smoking status: Never Smoker    Smokeless tobacco: Never Used   Substance and Sexual Activity    Alcohol use: No     Alcohol/week: 0.8 standard drinks     Types: 1 Standard drinks or equivalent per week    Drug use: No    Sexual activity: Not on file   Lifestyle    Physical activity     Days per week: Not on file     Minutes per session: Not on file    Stress: Not on file   Relationships    Social connections     Talks on phone: Not on file     Gets together: Not on file     Attends Muslim service: Not on file     Active member of club or organization: Not on file     Attends meetings of clubs or organizations: Not on file     Relationship status: Not on file    Intimate partner violence     Fear of current or ex partner: Not on file     Emotionally abused: Not on file     Physically abused: Not on file     Forced sexual activity: Not on file   Other Topics Concern    Not on file   Social History Narrative    Not on file        Family History:  No evidence for sudden cardiac death or premature CAD. Problem Relation Age of Onset    Heart Disease Paternal Uncle     High Blood Pressure Mother     Diabetes Mother     High Blood Pressure Father        Medications:  [x] Medications and dosages reviewed. Prior to Admission medications    Medication Sig Start Date End Date Taking?  Authorizing Provider   amLODIPine (NORVASC) 2.5 MG tablet Take 1 tablet by mouth daily 1/22/21  Yes Hoang York MD   furosemide (LASIX) 20 MG tablet Take 0.5 tablets by mouth daily 1/22/21  Yes Hoang York MD   acetaminophen (TYLENOL) 500 MG tablet Take 2 tablets by mouth every 6 hours 1/15/21  Yes Mellie Sever, APRN - CNP   aspirin 325 MG EC tablet Take 1 tablet by mouth daily 1/16/21  Yes Mellie Sever, APRN - CNP amiodarone (CORDARONE) 200 MG tablet Take 2 tablets by mouth 2 times daily for 7 days, THEN 1 tablet 2 times daily for 7 days, THEN 1 tablet daily for 14 days. 1/15/21 2/12/21 Yes ALEXANDER Ovalles CNP   atorvastatin (LIPITOR) 40 MG tablet Take 1 tablet by mouth nightly 1/15/21  Yes ALEXANDER Ovalles CNP   sennosides-docusate sodium (SENOKOT-S) 8.6-50 MG tablet Take 1 tablet by mouth 2 times daily 1/15/21  Yes ALEXANDER Ovalles CNP   pantoprazole (PROTONIX) 40 MG tablet Take 1 tablet by mouth daily 1/16/21  Yes ALEXANDER Ovalles CNP   metoprolol tartrate (LOPRESSOR) 50 MG tablet Take 1 tablet by mouth 2 times daily 1/15/21  Yes ALEXANDER Ovalles CNP   latanoprost (XALATAN) 0.005 % ophthalmic solution  1/6/20  Yes Historical Provider, MD   furosemide (LASIX) 20 MG tablet Take 0.5 tablets by mouth daily for 7 doses 1/27/21 2/3/21  Hoang York MD   gabapentin (NEURONTIN) 100 MG capsule Take 1 capsule by mouth 3 times daily for 7 days. 1/15/21 1/22/21  ALEXANDER Ovalles CNP   meloxicam [facility]) 15 MG tablet Take 1 tablet by mouth daily  Patient not taking: Reported on 2/9/2021 1/4/21   Gadiel Owen MD       Allergies:  Patient has no known allergies.      Review of Systems:    [x]Full ROS obtained and negative except as mentioned in HPI    Physical Examination:    BP 98/64 (Site: Right Upper Arm, Position: Sitting, Cuff Size: Medium Adult)   Pulse 76   Ht 5' 7\" (1.702 m)   Wt 183 lb (83 kg)   SpO2 96%   BMI 28.66 kg/m²   Wt Readings from Last 3 Encounters:   02/09/21 183 lb (83 kg)   02/02/21 185 lb (83.9 kg)   01/27/21 183 lb (83 kg)     Vitals:    02/09/21 1133   BP: 98/64   Pulse:    SpO2:        · GENERAL: Well developed, well nourished, no acute distress  · NEUROLOGICAL: Alert and oriented x3  · PSYCH: Normal mood and affect   · SKIN: Warm and dry  · HEENT: Normocephalic, atraumatic, Sclera non-icteric, mucous membranes moist  · NECK: supple, JVP normal · CARDIAC: Normal PMI, regular rate and rhythm, normal S1S2, no murmur, rub, or gallop  · RESPIRATORY: Normal respiratory effort, clear to auscultation bilaterally  · EXTREMITIES: no edema or clubbing, +2 pulses bilaterally   · MUSCULOSKELETAL: No joint swelling or tenderness, no chest wall tenderness  · GASTROINTESTINAL: normal bowel sounds, soft, non-tender    Labs:  Lab Review   Hospital Outpatient Visit on 01/22/2021   Component Date Value    Sodium 01/22/2021 137     Potassium 01/22/2021 5.4*    Chloride 01/22/2021 106     CO2 01/22/2021 23     Anion Gap 01/22/2021 8     Glucose 01/22/2021 118*    BUN 01/22/2021 13     CREATININE 01/22/2021 1.0     GFR Non- 01/22/2021 >60     GFR  01/22/2021 >60     Calcium 01/22/2021 8.9     WBC 01/22/2021 11.0     RBC 01/22/2021 3.14*    Hemoglobin 01/22/2021 9.9*    Hematocrit 01/22/2021 29.3*    MCV 01/22/2021 93.2     MCH 01/22/2021 31.5     MCHC 01/22/2021 33.8     RDW 01/22/2021 15.7*    Platelets 10/27/7872 629*    MPV 01/22/2021 6.9     Neutrophils % 01/22/2021 75.3     Lymphocytes % 01/22/2021 14.9     Monocytes % 01/22/2021 8.6     Eosinophils % 01/22/2021 0.7     Basophils % 01/22/2021 0.5     Neutrophils Absolute 01/22/2021 8.3*    Lymphocytes Absolute 01/22/2021 1.6     Monocytes Absolute 01/22/2021 0.9     Eosinophils Absolute 01/22/2021 0.1     Basophils Absolute 01/22/2021 0.1    Hospital Outpatient Visit on 01/22/2021   Component Date Value    aPTT 01/22/2021 29.9     Protime 01/22/2021 14.6*    INR 01/22/2021 1.26*   No results displayed because visit has over 200 results.       Hospital Outpatient Visit on 01/05/2021   Component Date Value    Left Ventricular Ejectio* 01/05/2021 55     LVEF MODALITY 01/05/2021 ECHO    Office Visit on 12/14/2020   Component Date Value    Hemoglobin A1C 12/14/2020 6.1     eAG 12/14/2020 128.4     Sodium 12/14/2020 143     Potassium 12/14/2020 4.2  Chloride 12/14/2020 105     CO2 12/14/2020 27     Anion Gap 12/14/2020 11     Glucose 12/14/2020 106*    BUN 12/14/2020 8     CREATININE 12/14/2020 1.0     GFR Non- 12/14/2020 >60     GFR  12/14/2020 >60     Calcium 12/14/2020 9.3     Total Protein 12/14/2020 6.9     Albumin 12/14/2020 4.4     Albumin/Globulin Ratio 12/14/2020 1.8     Total Bilirubin 12/14/2020 0.5     Alkaline Phosphatase 12/14/2020 125     ALT 12/14/2020 21     AST 12/14/2020 18     Globulin 12/14/2020 2.5     Cholesterol, Total 12/14/2020 176     Triglycerides 12/14/2020 87     HDL 12/14/2020 50     LDL Calculated 12/14/2020 109*    VLDL Cholesterol Calcula* 12/14/2020 17        Imaging:  I have reviewed the below testing personally:    GXT 2/17/20   Rest   ECG   Normal sinus rhythm. Stress   Stress Type: Exercise      Stress Protocol: Antolin      Rest HR: 79 bpm                             HR BP Product: 56180   Rest BP: 127/88 mmHg                        Max Exercise: 7 METS   Stress Peak HR: 137 bpm   Stress Peak BP: 156/71 mmHg   Predicted HR: 156 bpm   % of predicted HR: 88   Test Duration: 6 min   Reason for Termination: Physiologic Maximum      Results      ECG   Normal sinus rhythm. Normal electrocardiographic response to exercise with less than 1.0 mm of up   sloping ST depression. Symptoms   There was stress induced lightheadedness. Patient had \"muscular\" chest discomfort 7/10 before stress with no change   noted. Symptoms resolved with rest.    ECHO 1/5/21  Summary   -Normal left ventricle size, wall thickness and systolic function with an   estimated ejection fraction of 55%. -Pearce is not well visualized. -E/e\"= 11.6 .   -Normal diastolic function.   - Mild mitral regurgitation.   -Mild tricuspid regurgitation.   -Pasp 31mmHg.     1/7/21  Left Heart Cath  Dominance: Co      LM: short, MLI LAD: 80% short, ostial stenosis; three moderate sized diagonals with 90% ostial to proximal  D2 and 70% ostial D3; 40% mid LAD disease  LCx: large first marginal free of significant disease; 85% eccentric mid stenosis followed by 40% proximal OM2   RCA: moderate sized vessel with 95% distal stenosis just prior to two posterolateral branches and RPDA; 90% mid acute marginal      LVEDP: 8 mmHg   LVEF: 65%    Carotid US 1/7/21  No hemodynamically significant carotid stenosis noted on either side. Vertebral flow are antegrade bilaterally. Limited Echo 1/22/21  Summary   -Normal left ventricle size, wall thickness, and systolic function with an   estimated ejection fraction of 55%.   -No regional wall motion abnormalities are seen. -There is mild-to-moderate tricuspid regurgitation with a RVSP estimation of   33 mmHg. -There is mild circumferential pericardial effusion noted. Impression/Recommendations    Mr. Jacque Melgar is a 72 y.o. male patient with:    CAD: CABG x5 (LIMA-LAD, SVG-RPDA, SVG to D2- D3-LPLB) 1/8/21  PAF: post op, asymptomatic, s/p TORREY ligation, under the care of Dr. Erlinda Armstrong pleural effusion with US Left sided Thoracentesis 1/14/21  Hypertension, controlled   Hyperlipidemia, stable (12/2020:  TG 87 HDL 50 )  Borderline diabetes mellitus       Sasha Mo is now one month out from coronary artery bypass grafting. From an atrial fibrillation perspective, he has remained asymptomatic with this and on Amiodarone, Metoprolol & Aspirin monotherapy, currently wearing an event monitor. He has follow-up with both cardiothoracic surgery and electrophysiology coming up. He completed home physical and occupational therapy yesterday and is interested in starting cardiac rehab. He states that blood pressures with all of the home visits and with his own checks are controlled. No change to regimen today.         Orders Placed This Encounter   Procedures

## 2021-02-09 NOTE — PATIENT INSTRUCTIONS
Referral to cardiac rehab placed  No medication changes today  Keep follow up with Dr. Marianne Osgood   Follow up in 4 months  Call if you notice low blood pressure readings at home or if you have any other concerns      Cardiac Rehabilitation - [facility]  [address], [address] [facility]  [phone]

## 2021-02-09 NOTE — PROGRESS NOTES
2021    PATIENT: Jarrett Ventura  : 1955    Primary Care Provider:   Sulma Muhammad MD  U:[phone]  f:[phone]    Reason for evaluation:   Chief Complaint   Patient presents with    Coronary Artery Disease    Follow-Up from Hospital     History of present illness:   Mr. Jarrett Ventura is a 72 y.o. male patient here in close cardiovascular follow up after recent five vessel CABG, complicated by recurrent pleural effusion leading to thoracentesis x2 post op. He additionally had post op atrial fibrillation and a hematoma at left calf vein graft site. Stat limited echo for sob and ct scan demonstrating mild-mod pericardial effusion. Echo revealed mild effusion- no tamponade. Lisinopril dced for elevated K. Dr. Kasey Perla prescribed steroids and lasix burst and he was much improved in follow up . He has since been without shortness of breath, orthopnea, edema, palpitations, or chest pain. Sternal and left calf site are without open wounds and no recurrent bleeding issues. He states that occasionally he sees dried blood when blowing his nose. From an atrial fibrillation perspective, he has remained asymptomatic with this and on amiodarone, currently wearing an event monitor. He has follow-up with both cardiothoracic surgery and electrophysiology coming up. He completed home physical and occupational therapy yesterday and is interested in starting cardiac rehab. He states that blood pressures with all of the home visits and with his own checks have been controlled \"around 117/81\".      Medical History:      Diagnosis Date    Coronary artery disease involving native heart     Hyperlipidemia 2020     Surgical History:      Procedure Laterality Date    COLONOSCOPY  2018    CORONARY ARTERY BYPASS GRAFT N/A 2021    URGENT CABG X5, CORONARY ARTERY BYPASS COOK TO LAD, VEIN GRAFT TO RPDA, SEQUENTAIL VEIN GRAFT TO D3, D2 AND PL BRANCH CIRCUMFLEX, LIGATION LEFT ATRIAL APPENDAGE 40MM ATRICLIP, EVH LEFT LEG, ENDOSCOPICALLY VEIN HARVEST OF LEFT SAPHENOUS VEIN, GRETA AORTIC ULTRASOUND, DOPPLER VERIFICATION OF FLOW TO CONFIRM BYPASS GRAFTS, INSERTION OF VENTRICULAR PACING WIRES, BILATERAL INCOSTAL NEVRE BLOCK 5TH LEVEL W     Social History:  Social History     Socioeconomic History    Marital status:      Spouse name: Not on file    Number of children: Not on file    Years of education: Not on file    Highest education level: Not on file   Occupational History    Not on file   Social Needs    Financial resource strain: Not on file    Food insecurity     Worry: Not on file     Inability: Not on file    Transportation needs     Medical: Not on file     Non-medical: Not on file   Tobacco Use    Smoking status: Never Smoker    Smokeless tobacco: Never Used   Substance and Sexual Activity    Alcohol use: No     Alcohol/week: 0.8 standard drinks     Types: 1 Standard drinks or equivalent per week    Drug use: No    Sexual activity: Not on file   Lifestyle    Physical activity     Days per week: Not on file     Minutes per session: Not on file    Stress: Not on file   Relationships    Social connections     Talks on phone: Not on file     Gets together: Not on file     Attends Christian service: Not on file     Active member of club or organization: Not on file     Attends meetings of clubs or organizations: Not on file     Relationship status: Not on file    Intimate partner violence     Fear of current or ex partner: Not on file     Emotionally abused: Not on file     Physically abused: Not on file     Forced sexual activity: Not on file   Other Topics Concern    Not on file   Social History Narrative    Not on file        Family History:  No evidence for sudden cardiac death or premature CAD.       Problem Relation Age of Onset    Heart Disease Paternal Uncle     High Blood Pressure Mother     Diabetes Mother     High Blood (Site: Right Upper Arm, Position: Sitting, Cuff Size: Medium Adult)   Pulse 76   Ht 5' 7\" (1.702 m)   Wt 183 lb (83 kg)   SpO2 96%   BMI 28.66 kg/m²   Wt Readings from Last 3 Encounters:   02/09/21 183 lb (83 kg)   02/02/21 185 lb (83.9 kg)   01/27/21 183 lb (83 kg)     Vitals:    02/09/21 1133   BP: 98/64   Pulse:    SpO2:        · GENERAL: Well developed, well nourished, no acute distress  · NEUROLOGICAL: Alert and oriented x3  · PSYCH: Normal mood and affect   · SKIN: Warm and dry  · HEENT: Normocephalic, atraumatic, Sclera non-icteric, mucous membranes moist  · NECK: supple, JVP normal  · CARDIAC: Normal PMI, regular rate and rhythm, normal S1S2, no murmur, rub, or gallop  · RESPIRATORY: Normal respiratory effort, clear to auscultation bilaterally  · EXTREMITIES: no edema or clubbing, +2 pulses bilaterally   · MUSCULOSKELETAL: No joint swelling or tenderness, no chest wall tenderness  · GASTROINTESTINAL: normal bowel sounds, soft, non-tender    Labs:  Lab Review   Hospital Outpatient Visit on 01/22/2021   Component Date Value    Sodium 01/22/2021 137     Potassium 01/22/2021 5.4*    Chloride 01/22/2021 106     CO2 01/22/2021 23     Anion Gap 01/22/2021 8     Glucose 01/22/2021 118*    BUN 01/22/2021 13     CREATININE 01/22/2021 1.0     GFR Non- 01/22/2021 >60     GFR  01/22/2021 >60     Calcium 01/22/2021 8.9     WBC 01/22/2021 11.0     RBC 01/22/2021 3.14*    Hemoglobin 01/22/2021 9.9*    Hematocrit 01/22/2021 29.3*    MCV 01/22/2021 93.2     MCH 01/22/2021 31.5     MCHC 01/22/2021 33.8     RDW 01/22/2021 15.7*    Platelets 51/99/6326 629*    MPV 01/22/2021 6.9     Neutrophils % 01/22/2021 75.3     Lymphocytes % 01/22/2021 14.9     Monocytes % 01/22/2021 8.6     Eosinophils % 01/22/2021 0.7     Basophils % 01/22/2021 0.5     Neutrophils Absolute 01/22/2021 8.3*    Lymphocytes Absolute 01/22/2021 1.6     Monocytes Absolute 01/22/2021 0.9     Eosinophils Absolute 01/22/2021 0.1     Basophils Absolute 01/22/2021 0.1    Hospital Outpatient Visit on 01/22/2021   Component Date Value    aPTT 01/22/2021 29.9     Protime 01/22/2021 14.6*    INR 01/22/2021 1.26*   No results displayed because visit has over 200 results. Hospital Outpatient Visit on 01/05/2021   Component Date Value    Left Ventricular Ejectio* 01/05/2021 55     LVEF MODALITY 01/05/2021 ECHO    Office Visit on 12/14/2020   Component Date Value    Hemoglobin A1C 12/14/2020 6.1     eAG 12/14/2020 128.4     Sodium 12/14/2020 143     Potassium 12/14/2020 4.2     Chloride 12/14/2020 105     CO2 12/14/2020 27     Anion Gap 12/14/2020 11     Glucose 12/14/2020 106*    BUN 12/14/2020 8     CREATININE 12/14/2020 1.0     GFR Non- 12/14/2020 >60     GFR  12/14/2020 >60     Calcium 12/14/2020 9.3     Total Protein 12/14/2020 6.9     Albumin 12/14/2020 4.4     Albumin/Globulin Ratio 12/14/2020 1.8     Total Bilirubin 12/14/2020 0.5     Alkaline Phosphatase 12/14/2020 125     ALT 12/14/2020 21     AST 12/14/2020 18     Globulin 12/14/2020 2.5     Cholesterol, Total 12/14/2020 176     Triglycerides 12/14/2020 87     HDL 12/14/2020 50     LDL Calculated 12/14/2020 109*    VLDL Cholesterol Calcula* 12/14/2020 17        Imaging:  I have reviewed the below testing personally:    GXT 2/17/20   Rest   ECG   Normal sinus rhythm. Stress   Stress Type: Exercise      Stress Protocol: Antolin      Rest HR: 79 bpm                             HR BP Product: 57800   Rest BP: 127/88 mmHg                        Max Exercise: 7 METS   Stress Peak HR: 137 bpm   Stress Peak BP: 156/71 mmHg   Predicted HR: 156 bpm   % of predicted HR: 88   Test Duration: 6 min   Reason for Termination: Physiologic Maximum      Results      ECG   Normal sinus rhythm. Normal electrocardiographic response to exercise with less than 1.0 mm of up   sloping ST depression. Symptoms   There was stress induced lightheadedness. Patient had \"muscular\" chest discomfort 7/10 before stress with no change   noted. Symptoms resolved with rest.    ECHO 1/5/21  Summary   -Normal left ventricle size, wall thickness and systolic function with an   estimated ejection fraction of 55%. -Belfry is not well visualized. -E/e\"= 11.6 .   -Normal diastolic function.   - Mild mitral regurgitation.   -Mild tricuspid regurgitation.   -Pasp 31mmHg. 1/7/21  Left Heart Cath  Dominance: Co      LM: short, MLI  LAD: 80% short, ostial stenosis; three moderate sized diagonals with 90% ostial to proximal  D2 and 70% ostial D3; 40% mid LAD disease  LCx: large first marginal free of significant disease; 85% eccentric mid stenosis followed by 40% proximal OM2   RCA: moderate sized vessel with 95% distal stenosis just prior to two posterolateral branches and RPDA; 90% mid acute marginal      LVEDP: 8 mmHg   LVEF: 65%    Carotid US 1/7/21  No hemodynamically significant carotid stenosis noted on either side. Vertebral flow are antegrade bilaterally. Limited Echo 1/22/21  Summary   -Normal left ventricle size, wall thickness, and systolic function with an   estimated ejection fraction of 55%.   -No regional wall motion abnormalities are seen. -There is mild-to-moderate tricuspid regurgitation with a RVSP estimation of   33 mmHg. -There is mild circumferential pericardial effusion noted. Impression/Recommendations    Mr. Alexa Sparks is a 72 y.o. male patient with:    CAD: CABG x5 (LIMA-LAD, SVG-RPDA, SVG to D2- D3-LPLB) 1/8/21  PAF: post op, asymptomatic, s/p TORREY ligation, under the care of Dr. Mariana Mata pleural effusion with US Left sided Thoracentesis 1/14/21  Hypertension, controlled   Hyperlipidemia, stable (12/2020:  TG 87 HDL 50 )  Borderline diabetes mellitus       Alisson Hernández is now one month out from coronary artery bypass grafting.  From an atrial fibrillation perspective, he has remained asymptomatic with this and on Amiodarone, Metoprolol & Aspirin monotherapy, currently wearing an event monitor. He has follow-up with both cardiothoracic surgery and electrophysiology coming up. He completed home physical and occupational therapy yesterday and is interested in starting cardiac rehab. He states that blood pressures with all of the home visits and with his own checks are controlled. No change to regimen today. Orders Placed This Encounter   Procedures   [address]     Referral Priority:   Routine     Referral Type:   Eval and Treat     Referral Reason:   Specialty Services Required     Requested Specialty:   Cardiac Rehabilitation     Number of Visits Requested:   1     Return in about 4 months (around 6/9/2021). Patient Instructions   Referral to cardiac rehab placed  No medication changes today  Keep follow up with Dr. Eulogio Gillis   Follow up in 4 months  Call if you notice low blood pressure readings at home or if you have any other concerns      [place] [address] [email or web address]  [place], [address] [facility]  [phone]    Thank you for allowing me to participate in the care of your patient. Please do not hesitate to call. Keila Clay DO, [facility]  Interventional Cardiology     o: [phone]  [facility] [email or web address] [address], [address] [facility]      NOTE:  This report was transcribed using voice recognition software. Every effort was made to ensure accuracy; however, inadvertent computerized transcription errors may be present. Scribe's Attestation: This note was scribed in the presence of Dr. Solange Herrmann DO by Any Higgins RN.    I, Keila Clay, have personally performed the services described in this documentation as scribed by Mitzi Toledo RN in my presence, and it is both accurate and complete. An electronic signature was used to authenticate this note.

## 2021-02-18 ENCOUNTER — TELEPHONE (OUTPATIENT)
Dept: CARDIOTHORACIC SURGERY | Age: 66
End: 2021-02-18

## 2021-02-18 NOTE — TELEPHONE ENCOUNTER
Received a request for a refill on his amlodipine 2.5 mg tablets, he is to take one tab po daily, # 30 no refills. Called this in to the pharmacy.

## 2021-02-24 ENCOUNTER — OFFICE VISIT (OUTPATIENT)
Dept: CARDIOTHORACIC SURGERY | Age: 66
End: 2021-02-24

## 2021-02-24 VITALS
RESPIRATION RATE: 16 BRPM | TEMPERATURE: 98 F | OXYGEN SATURATION: 95 % | DIASTOLIC BLOOD PRESSURE: 82 MMHG | SYSTOLIC BLOOD PRESSURE: 118 MMHG | BODY MASS INDEX: 28.96 KG/M2 | HEIGHT: 67 IN | HEART RATE: 78 BPM | WEIGHT: 184.5 LBS

## 2021-02-24 DIAGNOSIS — Z95.1 S/P CABG X 5: Primary | ICD-10-CM

## 2021-02-24 PROCEDURE — 99024 POSTOP FOLLOW-UP VISIT: CPT | Performed by: THORACIC SURGERY (CARDIOTHORACIC VASCULAR SURGERY)

## 2021-02-24 NOTE — PROGRESS NOTES
Progress Note    CC:  2st Postoperative follow-up    S/P s/p urgent CABG x5, left atrial appendage exclusion on January 8, 2021, discharged home on January 15, 2021. Patient had a left thigh hematoma at the site of the vein harvest that required transfusion while he was in the hospital and a pleural effusion that was tapped under ultrasound guidance on once as an inpatient and once as an outpatient. Subjective: Today he states that he feels better overall since his last visit at the office. Since then he underwent a repeat left-sided thoracentesis for recurrent pleural effusion and he had a CT scan of his chest and an echo that showed a small pericardial effusion and preserved EF. He he is now able to walk longer distances without an exertional chest pain, shortness of breath, syncope, presyncope, palpitations. He is wearing a CardioNet monitor device prescribed by Dr. Harlan Deal from  for his postoperative A. fib to assess A. fib burden. He was never started anticoagulation upon discharge from the hospital because of his left thigh hematoma that required transfusion of 2 units of blood. This at this moment is resolving nicely in his left thigh is back to normal size. He still has some mild numbness along the course of the of the tunnel of the great saphenous vein harvest.  He otherwise denies any palpitations, syncope, presyncope, orthopnea, paroxysmal nocturnal dyspnea, fevers, chills, drainage from his incisions or any other constitutional symptoms. Vital Signs:                                                 /82 (Site: Right Upper Arm, Position: Sitting)   Pulse 78   Temp 98 °F (36.7 °C)   Resp 16   Ht 5' 7\" (1.702 m)   Wt 184 lb 8 oz (83.7 kg)   SpO2 95%   BMI 28.90 kg/m²        CV:   Regular rate and rhythm with no rubs or murmurs. Pulm: Clear lung fields with no rales or rhonchi. Incisions:    Sternotomy incision is clean, dry and intact. Sternum is stable. He does have diminished breath sounds along the left base. Abd:  Soft  Ext: Leg left thigh and upper calf hematoma along the left greater saphenous vein harvest tunnel is hardly visible or palpable has almost completely resolved. Its slightly tender to palpation more so posteriorly along the thigh but is otherwise relatively soft. Thigh Incisions is clean, dry and intact. The left calf vein harvest incision has a scab with some mild spotty drainage that I removed after cleaning up the wound down to granulation tissue. I used a sponge with peroxide to clean the wound and I placed a dressing. There is no erythema, cellulitis, or any other local signs of infection. Assessment/Plan:  Overall fair progress postop CABG x5 on January 8, 2021. He is doing better after the left-sided repeat thoracentesis for his recurrent left pleural effusion and his Medrol Dosepak for his mild to mild to moderate pericardial effusion. I instructed him to keep his appointment with Dr. Veronica Hussein from electrophysiology for his perioperative A. fib and CardioNet assessment of his heart rate. At this point I would feel comfortable with him being started on anticoagulation with Coumadin should Dr. Evangelista Morel deems this necessary as his left thigh hematoma is not an issue anymore and his hemoglobins have been stable. During my exam today he appears to be in normal sinus rhythm. For his left calf vein harvest wound I explained to him that he needs to change the dressing once a day. If this does not heal up completely I can easily refer him to the [facility] wound care clinic. He does have a +1 peripheral edema in the left leg and I instructed him to use compression stockings during the daytime for at least the next 2 to 4 weeks. I also did explain to him that due to the vein harvest there is a small chance that he might have persistent mild edema in this leg. Also instructed him to pursue outpatient cardiac rehab as this will help him build some more exercise capacity and endurance. I reminded him that this is a long recovery process and that he needs still a few more weeks before he gets back to his baseline. He asked 1 more time what was the exact reason that he had to have bypass surgery and whether his disease could be treated with stents and I explained to him that due to his multiple vessel coronary artery disease PCI with multiple stents was not the optimal treatment option that he would much better be served long-term with the CABG surgery he underwent. He also inquired about the type of diet she should be on and I explained to him that he should be on a cardiac healthy diet for the rest of his life. I referred him to the booklet he received upon discharge from the hospital.  The patient may increase activities as he feels comfortable doing so. I stressed to him to follow religiously the sternal precautions for the next 2 weeks. Follow-up with Dr. Trudy Andres and Dr. Ariella Coon as prescribed. Follow-up: Dr. Hiren Harper is aware that our office will call him in a couple of weeks to check on his progress and if need be would be more than happy to accommodate third postoperative visit should we feel there are any concerns that need attention at that point.     Hoang York MD  2/24/2021  3:09 PM

## 2021-03-04 ENCOUNTER — HOSPITAL ENCOUNTER (OUTPATIENT)
Dept: CARDIAC REHAB | Age: 66
Setting detail: THERAPIES SERIES
Discharge: HOME | End: 2021-03-04
Payer: MEDICAID

## 2021-03-04 VITALS
RESPIRATION RATE: 14 BRPM | BODY MASS INDEX: 29.1 KG/M2 | OXYGEN SATURATION: 99 % | WEIGHT: 185.4 LBS | HEIGHT: 67 IN | DIASTOLIC BLOOD PRESSURE: 70 MMHG | SYSTOLIC BLOOD PRESSURE: 102 MMHG | HEART RATE: 64 BPM

## 2021-03-04 PROCEDURE — 93798 PHYS/QHP OP CAR RHAB W/ECG: CPT

## 2021-03-04 NOTE — PROGRESS NOTES
[facility] Cardiac Rehabilitation Initial Evaluation    Chucky Bro       1955     [id number]    Share medical information:  Yes - Yoni Pacheco (wife):  [phone]    Cardiac History    CABG  EF:  55% on 2021    Physical Assessment     General Appearance   Height:  5' 7\" (170.2 cm)  Weight:  185 lb 6.4 oz (84.1 kg)(185.4)   BMI:  29.1  Skin color: Within Defined Limits      Cardiovascular Assessment  BP Sittin/70(left arm sitting)  Sittin/68(right arm sitting)  Standin/72(right arm standing)  Heart rate:   64 Monitor   Heart sounds:   Regular S1, S2, No adventitious heart sounds    Respiratory Assessment  Resp rate: 14     Regular  Normal  L Breath Sounds: Clear   R Breath Sounds: Clear  SpO2:  99 %  Quality/Effort:   Unlabored  Sleep Apnea:  No  CPAP  No  Oxygen  No     Sleeping Habits:  No sleep issues    Edema:  No edema present. Orthopedic/Exercise Limitations:  No    Pain:   Do you have pain?:  No - denies pain       Fall Risk Assessment     History of falling with or without injury: No  Use of ambulatory aid: No  Difficulty walking/impaired gait: No  Numbness in feet: No  Vision changes: No  Dizziness: No  Shortness of breath: No  Current medications include but not limited to: ACE, ARB, Beta  Blocker  Other fall risk : No  Outpatient fall risk intervention strategies: Fall risk education provided    Abuse / Neglect  Physical/behavioral signs of abuse/neglect   No    Do you feel safe at home   Yes    Advanced Directives  Patient has Advanced Directives:  No  Patient given Advanced Directive pack:  Not interested    Vaccinations  Influenza (annual): No  Pneumonia:  No    Pt. Arrived to Cardiopulmonary rehab for his initial interview and evaluation for Cardiac rehab. Pt's insurance was reviewed and pt. Was advised to contact his insurance provider if he had any questions or concerns. PMHX and PSHX as well as home medications were reviewed and verified by pt. Plan of care was reviewed and goal was set by pt. And agreed upon. Pt. Completed his six minute walk test at this time. Pt. To attend the 0830 AM class time beginning March 8, 2021.       CHLOE Mcconnell RN  3/4/2021

## 2021-03-05 ENCOUNTER — IMMUNIZATION (OUTPATIENT)
Dept: PRIMARY CARE CLINIC | Age: 66
End: 2021-03-05
Payer: MEDICAID

## 2021-03-05 PROCEDURE — 91301 COVID-19, MODERNA VACCINE 100MCG/0.5ML DOSE: CPT | Performed by: FAMILY MEDICINE

## 2021-03-05 PROCEDURE — 0011A COVID-19, MODERNA VACCINE 100MCG/0.5ML DOSE: CPT | Performed by: FAMILY MEDICINE

## 2021-03-08 ENCOUNTER — HOSPITAL ENCOUNTER (OUTPATIENT)
Dept: CARDIAC REHAB | Age: 66
Setting detail: THERAPIES SERIES
Discharge: HOME | End: 2021-03-08
Payer: MEDICAID

## 2021-03-08 PROCEDURE — 93798 PHYS/QHP OP CAR RHAB W/ECG: CPT

## 2021-03-10 ENCOUNTER — HOSPITAL ENCOUNTER (OUTPATIENT)
Dept: CARDIAC REHAB | Age: 66
Setting detail: THERAPIES SERIES
Discharge: HOME | End: 2021-03-10
Payer: MEDICAID

## 2021-03-10 PROCEDURE — 93798 PHYS/QHP OP CAR RHAB W/ECG: CPT

## 2021-03-11 ENCOUNTER — TELEPHONE (OUTPATIENT)
Dept: CARDIOTHORACIC SURGERY | Age: 66
End: 2021-03-11

## 2021-03-12 ENCOUNTER — TELEPHONE (OUTPATIENT)
Dept: CARDIOTHORACIC SURGERY | Age: 66
End: 2021-03-12

## 2021-03-12 ENCOUNTER — HOSPITAL ENCOUNTER (OUTPATIENT)
Dept: CARDIAC REHAB | Age: 66
Setting detail: THERAPIES SERIES
Discharge: HOME | End: 2021-03-12
Payer: MEDICAID

## 2021-03-12 PROCEDURE — 93798 PHYS/QHP OP CAR RHAB W/ECG: CPT

## 2021-03-12 NOTE — TELEPHONE ENCOUNTER
Spoke w/wife, states Mr Martinez Has doing 'very well'. Moving around and attending out pt rehab 3 x/week. Denies any concerning issues.

## 2021-03-15 ENCOUNTER — OFFICE VISIT (OUTPATIENT)
Dept: INTERNAL MEDICINE CLINIC | Age: 66
End: 2021-03-15
Payer: MEDICAID

## 2021-03-15 ENCOUNTER — HOSPITAL ENCOUNTER (OUTPATIENT)
Dept: CARDIAC REHAB | Age: 66
Setting detail: THERAPIES SERIES
Discharge: HOME | End: 2021-03-15
Payer: MEDICAID

## 2021-03-15 VITALS
SYSTOLIC BLOOD PRESSURE: 102 MMHG | TEMPERATURE: 94.6 F | DIASTOLIC BLOOD PRESSURE: 62 MMHG | HEART RATE: 76 BPM | BODY MASS INDEX: 28.98 KG/M2 | WEIGHT: 185 LBS | OXYGEN SATURATION: 98 %

## 2021-03-15 DIAGNOSIS — I25.810 CORONARY ARTERY DISEASE INVOLVING CORONARY BYPASS GRAFT OF NATIVE HEART WITHOUT ANGINA PECTORIS: ICD-10-CM

## 2021-03-15 DIAGNOSIS — M54.50 ACUTE LEFT-SIDED LOW BACK PAIN WITHOUT SCIATICA: Primary | ICD-10-CM

## 2021-03-15 PROBLEM — I48.91 ATRIAL FIBRILLATION (HCC): Status: RESOLVED | Noted: 2021-01-27 | Resolved: 2021-03-15

## 2021-03-15 PROCEDURE — 93798 PHYS/QHP OP CAR RHAB W/ECG: CPT

## 2021-03-15 PROCEDURE — 99213 OFFICE O/P EST LOW 20 MIN: CPT | Performed by: INTERNAL MEDICINE

## 2021-03-15 ASSESSMENT — PATIENT HEALTH QUESTIONNAIRE - PHQ9
1. LITTLE INTEREST OR PLEASURE IN DOING THINGS: 0
SUM OF ALL RESPONSES TO PHQ QUESTIONS 1-9: 0
SUM OF ALL RESPONSES TO PHQ QUESTIONS 1-9: 0
2. FEELING DOWN, DEPRESSED OR HOPELESS: 0
SUM OF ALL RESPONSES TO PHQ9 QUESTIONS 1 & 2: 0

## 2021-03-15 NOTE — PROGRESS NOTES
Loi Hatch (:  1955) is a 72 y.o. male,Established patient, here for evaluation of the following chief complaint(s):  Hip Pain (c/o b/l hip pain)      ASSESSMENT/PLAN:  1. Acute left-sided low back pain without sciatica  Stable  -  Refer to [facility] for physical therapy    2. Coronary artery disease involving coronary bypass graft of native heart without angina pectoris  -     Lipid Panel; Future  -     Comprehensive Metabolic Panel; Future  -  May need to add fish oil or increase the lipitor      Return in about 2 months (around 5/15/2021) for CAD 30 min. SUBJECTIVE/OBJECTIVE:  HPI  Patient comes in for follow-up of a 5 vessel cabg. He is currently on a beta blocker, statin and aspirin. He is currently in cardiac rehab. He is doing better. Patient also has acute onset of low back pain. He has not taken any medication for this. He would like to try some physical therapy. Review of Systems    Vitals:    03/15/21 0905   BP: 102/62   Site: Left Upper Arm   Position: Sitting   Cuff Size: Large Adult   Pulse: 76   Temp: 94.6 °F (34.8 °C)   TempSrc: Infrared   SpO2: 98%   Weight: 185 lb (83.9 kg)      Wt Readings from Last 3 Encounters:   03/15/21 185 lb (83.9 kg)   21 185 lb 6.4 oz (84.1 kg)   21 184 lb 8 oz (83.7 kg)     BP Readings from Last 3 Encounters:   03/15/21 102/62   21 102/70   21 118/82     Body mass index is 28.98 kg/m². Facility age limit for growth percentiles is 20 years. Physical Exam  Constitutional:       General: He is not in acute distress. Appearance: Normal appearance. He is not ill-appearing. HENT:      Head: Normocephalic and atraumatic. Right Ear: Tympanic membrane and ear canal normal.      Left Ear: Tympanic membrane and ear canal normal.      Nose: Nose normal. No congestion or rhinorrhea. Mouth/Throat:      Mouth: Mucous membranes are moist.      Pharynx: No oropharyngeal exudate or posterior oropharyngeal erythema. Eyes:      General:         Right eye: No discharge. Left eye: No discharge. Extraocular Movements: Extraocular movements intact. Pupils: Pupils are equal, round, and reactive to light. Neck:      Musculoskeletal: Normal range of motion. No neck rigidity or muscular tenderness. Cardiovascular:      Rate and Rhythm: Normal rate and regular rhythm. Pulses: Normal pulses. Heart sounds: No murmur. No friction rub. Pulmonary:      Effort: Pulmonary effort is normal. No respiratory distress. Breath sounds: No stridor. No wheezing or rhonchi. Musculoskeletal:      Lumbar back: He exhibits tenderness, pain and spasm. Back:    Neurological:      Mental Status: He is alert. An electronic signature was used to authenticate this note.     --Sulma Muhammad MD

## 2021-03-17 ENCOUNTER — HOSPITAL ENCOUNTER (OUTPATIENT)
Dept: CARDIAC REHAB | Age: 66
Setting detail: THERAPIES SERIES
Discharge: HOME | End: 2021-03-17
Payer: MEDICAID

## 2021-03-17 PROCEDURE — 93798 PHYS/QHP OP CAR RHAB W/ECG: CPT

## 2021-03-22 ENCOUNTER — HOSPITAL ENCOUNTER (OUTPATIENT)
Dept: CARDIAC REHAB | Age: 66
Setting detail: THERAPIES SERIES
Discharge: HOME | End: 2021-03-22
Payer: MEDICAID

## 2021-03-22 PROCEDURE — 93798 PHYS/QHP OP CAR RHAB W/ECG: CPT

## 2021-03-24 ENCOUNTER — HOSPITAL ENCOUNTER (OUTPATIENT)
Dept: CARDIAC REHAB | Age: 66
Setting detail: THERAPIES SERIES
Discharge: HOME | End: 2021-03-24
Payer: MEDICAID

## 2021-04-02 ENCOUNTER — IMMUNIZATION (OUTPATIENT)
Dept: PRIMARY CARE CLINIC | Age: 66
End: 2021-04-02
Payer: MEDICAID

## 2021-04-02 PROCEDURE — 0012A COVID-19, MODERNA VACCINE 100MCG/0.5ML DOSE: CPT | Performed by: FAMILY MEDICINE

## 2021-04-02 PROCEDURE — 91301 COVID-19, MODERNA VACCINE 100MCG/0.5ML DOSE: CPT | Performed by: FAMILY MEDICINE

## 2021-04-14 ENCOUNTER — TELEPHONE (OUTPATIENT)
Dept: CARDIOLOGY CLINIC | Age: 66
End: 2021-04-14

## 2021-04-14 DIAGNOSIS — Z95.1 S/P CABG (CORONARY ARTERY BYPASS GRAFT): ICD-10-CM

## 2021-04-14 DIAGNOSIS — J90 PLEURAL EFFUSION: ICD-10-CM

## 2021-04-14 DIAGNOSIS — I31.39 PERICARDIAL EFFUSION: ICD-10-CM

## 2021-04-14 DIAGNOSIS — Z98.890 S/P THORACENTESIS: ICD-10-CM

## 2021-04-14 DIAGNOSIS — Z95.1 S/P CABG X 5: ICD-10-CM

## 2021-04-14 DIAGNOSIS — I48.19 ATRIAL FIBRILLATION, PERSISTENT (HCC): ICD-10-CM

## 2021-04-14 RX ORDER — AMLODIPINE BESYLATE 2.5 MG/1
2.5 TABLET ORAL DAILY
Qty: 90 TABLET | Refills: 3 | Status: SHIPPED | OUTPATIENT
Start: 2021-04-14 | End: 2022-01-10 | Stop reason: SDUPTHER

## 2021-04-14 RX ORDER — ATORVASTATIN CALCIUM 40 MG/1
40 TABLET, FILM COATED ORAL NIGHTLY
Qty: 90 TABLET | Refills: 3 | Status: SHIPPED | OUTPATIENT
Start: 2021-04-14 | End: 2022-06-28

## 2021-04-14 NOTE — TELEPHONE ENCOUNTER
Patient is out of Lipitor and Amlodipine and has not been taking these for at least two weeks. I discussed his medications and will send refills for these. He has only been taking metoprolol and  mg.  Will send to  and Miguel Lao as an [facility] [place]

## 2021-05-28 PROBLEM — I48.0 PAF (PAROXYSMAL ATRIAL FIBRILLATION) (HCC): Status: ACTIVE | Noted: 2021-05-28

## 2021-05-28 PROBLEM — I10 ESSENTIAL HYPERTENSION: Status: ACTIVE | Noted: 2021-05-28

## 2021-05-28 NOTE — PROGRESS NOTES
21    PATIENT: Servando Heimlich  : 1955    Primary Care Provider:   Brian Galindo MD  P:[phone]  f:[phone]     Reason for evaluation:   Chief Complaint   Patient presents with    Coronary Artery Disease    Follow-up     4 mo     History of present illness:   Mr. Servando Heimlich is a 72 y.o. male patient here in cardiovascular follow up regarding CAD (CABG 21), hypertension, and hyperlipidemia. Pasty Oppenheim was off Amlodipine and Atorvastatin for several weeks and now has refills he says. He is doing two mile walks in place of cardiac rehab as he is also completing physical therapy for low back pain, which he says has been his most limiting feature. Reports \"sore puffiness on the outer side\" referencing sternotomy line. No deep chest pain as before. Denies palpitations, lightheadedness, or syncope and is without shortness of breath, PND, orthopnea, or LE edema. Patient is compliant with medications and is tolerating them well without adverse effects.     Medical History:      Diagnosis Date    Coronary artery disease involving native heart     Essential hypertension 2021    Hyperlipidemia 2020     Surgical History:      Procedure Laterality Date    COLONOSCOPY  2018    CORONARY ARTERY BYPASS GRAFT N/A 2021    URGENT CABG X5, CORONARY ARTERY BYPASS COOK TO LAD, VEIN GRAFT TO RPDA, SEQUENTAIL VEIN GRAFT TO D3, D2 AND PL BRANCH CIRCUMFLEX, LIGATION LEFT ATRIAL APPENDAGE 40MM ATRICLIP, EVH LEFT LEG, ENDOSCOPICALLY VEIN HARVEST OF LEFT SAPHENOUS VEIN, GRETA AORTIC ULTRASOUND, DOPPLER VERIFICATION OF FLOW TO CONFIRM BYPASS GRAFTS, INSERTION OF VENTRICULAR PACING WIRES, BILATERAL INCOSTAL NEVRE BLOCK 5TH LEVEL W     Social History:  Social History     Socioeconomic History    Marital status:      Spouse name: Ann Conway Number of children: 2    Years of education: 12    Highest education level: Not on file   Occupational History    Not on file   Tobacco Use    Smoking status: Never Smoker    Smokeless tobacco: Never Used   Vaping Use    Vaping Use: Never used   Substance and Sexual Activity    Alcohol use: No     Alcohol/week: 0.8 standard drinks     Types: 1 Standard drinks or equivalent per week    Drug use: No    Sexual activity: Not on file   Other Topics Concern    Not on file   Social History Narrative    Not on file     Social Determinants of Health     Financial Resource Strain:     Difficulty of Paying Living Expenses:    Food Insecurity:     Worried About Running Out of Food in the Last Year:     Ran Out of Food in the Last Year:    Transportation Needs:     Lack of Transportation (Medical):  Lack of Transportation (Non-Medical):    Physical Activity:     Days of Exercise per Week:     Minutes of Exercise per Session:    Stress:     Feeling of Stress :    Social Connections:     Frequency of Communication with Friends and Family:     Frequency of Social Gatherings with Friends and Family:     Attends Faith Services:     Active Member of Clubs or Organizations:     Attends Club or Organization Meetings:     Marital Status:    Intimate Partner Violence:     Fear of Current or Ex-Partner:     Emotionally Abused:     Physically Abused:     Sexually Abused:         Family History:  No evidence for sudden cardiac death or premature CAD. Problem Relation Age of Onset    Heart Disease Paternal Uncle     High Blood Pressure Mother     Diabetes Mother     High Blood Pressure Father        Medications:  [x] Medications and dosages reviewed. Prior to Admission medications    Medication Sig Start Date End Date Taking?  Authorizing Provider   metoprolol tartrate (LOPRESSOR) 50 MG tablet Take 1 tablet by mouth 2 times daily 6/9/21  Yes Jem Rios DO   aspirin 325 MG EC tablet Take 1 tablet by mouth daily 5/12/21  Yes Jem Rios, DO   amLODIPine (NORVASC) 2.5 MG tablet Take 1 tablet by mouth daily 4/14/21  Yes Anup Rodrigues MD   atorvastatin (LIPITOR) 40 MG tablet Take 1 tablet by mouth nightly 4/14/21  Yes Anup Rodrigues MD   acetaminophen (TYLENOL) 500 MG tablet Take 2 tablets by mouth every 6 hours 1/15/21  Yes ALEXANDER Mart CNP   latanoprost (XALATAN) 0.005 % ophthalmic solution  1/6/20   Historical Provider, MD       Allergies:  Patient has no known allergies. Review of Systems:    [x]Full ROS obtained and negative except as mentioned in HPI    Physical Examination:    /70 (Site: Right Upper Arm, Position: Sitting, Cuff Size: Medium Adult)   Pulse 75   Ht 5' 7\" (1.702 m)   Wt 186 lb 8 oz (84.6 kg)   SpO2 97%   BMI 29.21 kg/m²   Wt Readings from Last 3 Encounters:   06/09/21 186 lb 8 oz (84.6 kg)   03/15/21 185 lb (83.9 kg)   03/04/21 185 lb 6.4 oz (84.1 kg)     Vitals:    06/09/21 0922   BP: 110/70   Pulse: 75   SpO2: 97%       · GENERAL: Well developed, well nourished, no acute distress  · NEUROLOGICAL: Alert and oriented x3  · PSYCH: Normal mood and affect   · SKIN: Warm and dry  · HEENT: Normocephalic, atraumatic, Sclera non-icteric, mucous membranes moist  · NECK: supple, JVP normal  · CARDIAC: Normal PMI, regular rate and rhythm, normal S1S2, no murmur, rub, or gallop  · RESPIRATORY: Normal respiratory effort, clear to auscultation bilaterally  · EXTREMITIES: no edema or clubbing, +2 pulses bilaterally   · MUSCULOSKELETAL: No joint swelling or tenderness, no chest wall tenderness  · GASTROINTESTINAL: normal bowel sounds, soft, non-tender    Labs:  Lab Review   No visits with results within 2 Month(s) from this visit.    Latest known visit with results is:   Hospital Outpatient Visit on 01/22/2021   Component Date Value    Sodium 01/22/2021 137     Potassium 01/22/2021 5.4*    Chloride 01/22/2021 106     CO2 01/22/2021 23     Anion Gap 01/22/2021 8     Glucose 01/22/2021 118*    BUN 01/22/2021 13     CREATININE 01/22/2021 1.0     GFR Non- 01/22/2021 >60     GFR  01/22/2021 >60     Calcium 01/22/2021 8.9     WBC 01/22/2021 11.0     RBC 01/22/2021 3.14*    Hemoglobin 01/22/2021 9.9*    Hematocrit 01/22/2021 29.3*    MCV 01/22/2021 93.2     MCH 01/22/2021 31.5     MCHC 01/22/2021 33.8     RDW 01/22/2021 15.7*    Platelets 31/84/6552 629*    MPV 01/22/2021 6.9     Neutrophils % 01/22/2021 75.3     Lymphocytes % 01/22/2021 14.9     Monocytes % 01/22/2021 8.6     Eosinophils % 01/22/2021 0.7     Basophils % 01/22/2021 0.5     Neutrophils Absolute 01/22/2021 8.3*    Lymphocytes Absolute 01/22/2021 1.6     Monocytes Absolute 01/22/2021 0.9     Eosinophils Absolute 01/22/2021 0.1     Basophils Absolute 01/22/2021 0.1        Imaging:  I have reviewed the below testing personally:    MCOT 02/02/2021  To  03/03/2021- showed no atrial fibrillation, PVC< 1%, PAC <1%, high , Low HR 50, Average HR 77. Diary symptoms with Sinus Rhythm    GXT 2/17/20   Rest   ECG   Normal sinus rhythm. Stress   Stress Type: Exercise      Stress Protocol: Antolin      Rest HR: 79 bpm                             HR BP Product: [id number]   Rest BP: 127/88 mmHg                        Max Exercise: 7 METS   Stress Peak HR: 137 bpm   Stress Peak BP: 156/71 mmHg   Predicted HR: 156 bpm   % of predicted HR: 88   Test Duration: 6 min   Reason for Termination: Physiologic Maximum      Results      ECG   Normal sinus rhythm. Normal electrocardiographic response to exercise with less than 1.0 mm of up   sloping ST depression. Symptoms   There was stress induced lightheadedness. Patient had \"muscular\" chest discomfort 7/10 before stress with no change   noted. Symptoms resolved with rest.    ECHO 1/5/21  Summary   -Normal left ventricle size, wall thickness and systolic function with an   estimated ejection fraction of 55%. -[place] is not well visualized.    -E/e\"= 11.6 .   -Normal diastolic function.   - Mild mitral regurgitation.   -Mild tricuspid regurgitation.   -Pasp 31mmHg. 1/7/21  Left Heart Cath  Dominance: Co      LM: short, MLI  LAD: 80% short, ostial stenosis; three moderate sized diagonals with 90% ostial to proximal  D2 and 70% ostial D3; 40% mid LAD disease  LCx: large first marginal free of significant disease; 85% eccentric mid stenosis followed by 40% proximal OM2   RCA: moderate sized vessel with 95% distal stenosis just prior to two posterolateral branches and RPDA; 90% mid acute marginal      LVEDP: 8 mmHg   LVEF: 65%    Carotid US 1/7/21  No hemodynamically significant carotid stenosis noted on either side. Vertebral flow are antegrade bilaterally. Limited Echo 1/22/21  Summary   -Normal left ventricle size, wall thickness, and systolic function with an   estimated ejection fraction of 55%.   -No regional wall motion abnormalities are seen. -There is mild-to-moderate tricuspid regurgitation with a RVSP estimation of   33 mmHg. -There is mild circumferential pericardial effusion noted. Impression/Recommendations    Mr. Angy Reza is a 72 y.o. male patient with:    CAD: CABG x5 (LIMA-LAD, SVG-RPDA, SVG to D2- D3-LPLB) 1/8/21  PAF: post op, asymptomatic, s/p TORREY ligation, under the care of Dr. Rafaela Mai pleural effusion with US Left sided Thoracentesis 1/14/21  Hypertension, controlled (110/70 in office)  Hyperlipidemia, stable (1/2021:  TG 77 HDL 38 )  Borderline diabetes mellitus     Clint Matthew is doing well five months out from bypass surgery. He had returned to baseline functional status but is currently limited by low back. Continue aspirin, beta blocker, statin without change in recommendations at this time. Repeat lipids at next visit. Return in about 6 months (around 12/9/2021) for MFF NP KL. Patient Instructions   Elevate your  feet at the end of the day to help with left leg swelling. Continue walking for exercise. Avoid NSAIDS- Ibuprofen, Motrin, advil.  Okay to take tylenol or topical voltaren gel. You can use a heating pad for soreness and fullness in left chest wall. Follow up in six months with NP then we will see your once a year        Thank you for allowing me to participate in the care of your patient. Please do not hesitate to call. Justus Bowie DO, [facility]  Interventional Cardiology     o: [phone]  [address] [place] [address]., [address] [facility], [address] [place] [address]      NOTE:  This report was transcribed using voice recognition software. Every effort was made to ensure accuracy; however, inadvertent computerized transcription errors may be present. Scribes attestation . This note was scribed in the presence of Dr. Sharad Hunter DO by Brandon CERVANTES, RN    I, Justus Bowie, have personally performed the services described in this documentation as scribed by Brandon CERVANTES, RNin my presence, and it is both accurate and complete. An electronic signature was used to authenticate this note.

## 2021-06-09 ENCOUNTER — OFFICE VISIT (OUTPATIENT)
Dept: CARDIOLOGY CLINIC | Age: 66
End: 2021-06-09
Payer: MEDICAID

## 2021-06-09 VITALS
WEIGHT: 186.5 LBS | BODY MASS INDEX: 29.27 KG/M2 | OXYGEN SATURATION: 97 % | HEIGHT: 67 IN | SYSTOLIC BLOOD PRESSURE: 110 MMHG | HEART RATE: 75 BPM | DIASTOLIC BLOOD PRESSURE: 70 MMHG

## 2021-06-09 DIAGNOSIS — I10 ESSENTIAL HYPERTENSION: ICD-10-CM

## 2021-06-09 DIAGNOSIS — I48.0 PAF (PAROXYSMAL ATRIAL FIBRILLATION) (HCC): ICD-10-CM

## 2021-06-09 DIAGNOSIS — I25.810 CORONARY ARTERY DISEASE INVOLVING CORONARY BYPASS GRAFT OF NATIVE HEART WITHOUT ANGINA PECTORIS: Primary | ICD-10-CM

## 2021-06-09 DIAGNOSIS — E78.5 DYSLIPIDEMIA: ICD-10-CM

## 2021-06-09 PROCEDURE — 99214 OFFICE O/P EST MOD 30 MIN: CPT | Performed by: INTERNAL MEDICINE

## 2021-06-09 RX ORDER — METOPROLOL TARTRATE 50 MG/1
50 TABLET, FILM COATED ORAL 2 TIMES DAILY
Qty: 180 TABLET | Refills: 3 | Status: SHIPPED | OUTPATIENT
Start: 2021-06-09 | End: 2022-06-21

## 2021-06-09 NOTE — PATIENT INSTRUCTIONS
Elevate your  feet at the end of the day to help with left leg swelling. Continue walking for exercise. Avoid NSAIDS- Ibuprofen, Motrin, advil. Okay to take tylenol or topical voltaren gel. You can use a heating pad for soreness and fullness in left chest wall.     Follow up in six months with NP then we will see your once a year

## 2021-07-07 ENCOUNTER — TELEPHONE (OUTPATIENT)
Dept: CARDIOLOGY CLINIC | Age: 66
End: 2021-07-07

## 2021-07-07 DIAGNOSIS — R07.89 CHEST TIGHTNESS: Primary | ICD-10-CM

## 2021-07-07 DIAGNOSIS — I25.10 CORONARY ARTERY DISEASE INVOLVING NATIVE CORONARY ARTERY OF NATIVE HEART WITHOUT ANGINA PECTORIS: ICD-10-CM

## 2021-07-07 DIAGNOSIS — R07.9 CHEST PAIN, UNSPECIFIED TYPE: ICD-10-CM

## 2021-07-07 NOTE — TELEPHONE ENCOUNTER
Pt states he is having chest tightness with some soreness, dizziness and slight sob x 2 weeks.  Please call to advise

## 2021-07-07 NOTE — TELEPHONE ENCOUNTER
Discussed with [facility]- offer Suzi Shields a CXR and also suggest using topical ointment for pain. Attempted to call Suzi Shields back, no answer unable to leave message as mailbox full.

## 2021-07-07 NOTE — TELEPHONE ENCOUNTER
Siddhartha Gill reports he has been feeling tightness across his chest, feels \"like there may be fluid there\" and it \"feels sore. \" Discomfort stays \"all day. \" Worse with pressing on his chest. \"Tightness\" is different from the \"soreness. \" tightness worsened if he partially leans back, such as in a car seat. Doesn't necessarily come on when he is walking; \"is already there\" and \"never leaves. \" worsens also when he \"reached for something. \" Doesn't feel like when he needed thoracentesis. He feels a lot of the discomfort is related to surgical scar, discomfort was not happening when he saw [facility] last month.

## 2021-07-12 ENCOUNTER — HOSPITAL ENCOUNTER (OUTPATIENT)
Dept: GENERAL RADIOLOGY | Age: 66
Discharge: HOME | End: 2021-07-12
Payer: MEDICAID

## 2021-07-12 ENCOUNTER — HOSPITAL ENCOUNTER (OUTPATIENT)
Age: 66
Discharge: HOME | End: 2021-07-12
Payer: MEDICAID

## 2021-07-12 DIAGNOSIS — R07.89 CHEST TIGHTNESS: ICD-10-CM

## 2021-07-12 DIAGNOSIS — R07.9 CHEST PAIN, UNSPECIFIED TYPE: ICD-10-CM

## 2021-07-12 DIAGNOSIS — I25.10 CORONARY ARTERY DISEASE INVOLVING NATIVE CORONARY ARTERY OF NATIVE HEART WITHOUT ANGINA PECTORIS: ICD-10-CM

## 2021-07-12 PROCEDURE — 71046 X-RAY EXAM CHEST 2 VIEWS: CPT

## 2021-09-14 ENCOUNTER — TELEPHONE (OUTPATIENT)
Dept: CARDIOLOGY CLINIC | Age: 66
End: 2021-09-14

## 2021-09-14 NOTE — TELEPHONE ENCOUNTER
Pt feels tightness in his chest, it feels like its swollen even when he touches it. He does not know if he should be concerned.          Pls advise thank you

## 2021-09-14 NOTE — TELEPHONE ENCOUNTER
Doesn't feel like angina prior to CABG. \"Mostly feels like swollen, sensitive to touch, and worse with deep breathing. ... not like it aches me\". CXR after last appointment negative. Plans to see in office October 6th. No need for stress testing at this time.

## 2021-10-06 ENCOUNTER — HOSPITAL ENCOUNTER (OUTPATIENT)
Age: 66
Discharge: HOME | End: 2021-10-06
Payer: MEDICAID

## 2021-10-06 ENCOUNTER — TELEPHONE (OUTPATIENT)
Dept: CARDIOLOGY CLINIC | Age: 66
End: 2021-10-06

## 2021-10-06 ENCOUNTER — OFFICE VISIT (OUTPATIENT)
Dept: CARDIOLOGY CLINIC | Age: 66
End: 2021-10-06
Payer: MEDICAID

## 2021-10-06 VITALS
HEIGHT: 67 IN | BODY MASS INDEX: 29.42 KG/M2 | OXYGEN SATURATION: 98 % | HEART RATE: 88 BPM | WEIGHT: 187.44 LBS | DIASTOLIC BLOOD PRESSURE: 62 MMHG | SYSTOLIC BLOOD PRESSURE: 102 MMHG

## 2021-10-06 DIAGNOSIS — R07.89 CHEST TIGHTNESS: ICD-10-CM

## 2021-10-06 DIAGNOSIS — R07.89 CHEST TIGHTNESS: Primary | ICD-10-CM

## 2021-10-06 DIAGNOSIS — E78.2 MIXED HYPERLIPIDEMIA: ICD-10-CM

## 2021-10-06 DIAGNOSIS — I25.10 CORONARY ARTERY DISEASE INVOLVING NATIVE CORONARY ARTERY OF NATIVE HEART WITHOUT ANGINA PECTORIS: ICD-10-CM

## 2021-10-06 DIAGNOSIS — I10 ESSENTIAL HYPERTENSION: ICD-10-CM

## 2021-10-06 LAB
A/G RATIO: 1.3 (ref 1.1–2.2)
ALBUMIN SERPL-MCNC: 4 G/DL (ref 3.4–5)
ALP BLD-CCNC: 139 U/L (ref 40–129)
ALT SERPL-CCNC: 21 U/L (ref 10–40)
ANION GAP SERPL CALCULATED.3IONS-SCNC: 11 MMOL/L (ref 3–16)
AST SERPL-CCNC: 21 U/L (ref 15–37)
BILIRUB SERPL-MCNC: 0.7 MG/DL (ref 0–1)
BUN BLDV-MCNC: 12 MG/DL (ref 7–20)
CALCIUM SERPL-MCNC: 9.3 MG/DL (ref 8.3–10.6)
CHLORIDE BLD-SCNC: 105 MMOL/L (ref 99–110)
CHOLESTEROL, FASTING: 106 MG/DL (ref 0–199)
CO2: 24 MMOL/L (ref 21–32)
CREAT SERPL-MCNC: 1 MG/DL (ref 0.8–1.3)
GFR AFRICAN AMERICAN: >60
GFR NON-AFRICAN AMERICAN: >60
GLOBULIN: 3 G/DL
GLUCOSE BLD-MCNC: 138 MG/DL (ref 70–99)
HCT VFR BLD CALC: 42.2 % (ref 40.5–52.5)
HDLC SERPL-MCNC: 39 MG/DL (ref 40–60)
HEMOGLOBIN: 13.6 G/DL (ref 13.5–17.5)
LDL CHOLESTEROL CALCULATED: 42 MG/DL
MCH RBC QN AUTO: 30.1 PG (ref 26–34)
MCHC RBC AUTO-ENTMCNC: 32.2 G/DL (ref 31–36)
MCV RBC AUTO: 93.4 FL (ref 80–100)
PDW BLD-RTO: 14 % (ref 12.4–15.4)
PLATELET # BLD: 230 K/UL (ref 135–450)
PLATELET SLIDE REVIEW: ADEQUATE
PMV BLD AUTO: 9.1 FL (ref 5–10.5)
POTASSIUM SERPL-SCNC: 4.3 MMOL/L (ref 3.5–5.1)
RBC # BLD: 4.52 M/UL (ref 4.2–5.9)
SEDIMENTATION RATE, ERYTHROCYTE: 18 MM/HR (ref 0–20)
SLIDE REVIEW: NORMAL
SODIUM BLD-SCNC: 140 MMOL/L (ref 136–145)
TOTAL PROTEIN: 7 G/DL (ref 6.4–8.2)
TRIGLYCERIDE, FASTING: 126 MG/DL (ref 0–150)
VLDLC SERPL CALC-MCNC: 25 MG/DL
WBC # BLD: 6.5 K/UL (ref 4–11)

## 2021-10-06 PROCEDURE — 80053 COMPREHEN METABOLIC PANEL: CPT

## 2021-10-06 PROCEDURE — 99214 OFFICE O/P EST MOD 30 MIN: CPT | Performed by: NURSE PRACTITIONER

## 2021-10-06 PROCEDURE — 36415 COLL VENOUS BLD VENIPUNCTURE: CPT

## 2021-10-06 PROCEDURE — 85027 COMPLETE CBC AUTOMATED: CPT

## 2021-10-06 PROCEDURE — 85652 RBC SED RATE AUTOMATED: CPT

## 2021-10-06 PROCEDURE — 80061 LIPID PANEL: CPT

## 2021-10-06 NOTE — TELEPHONE ENCOUNTER
Patient saw NPTS today . When he got home he missed a call from this office . He thinks someone was calling him about the labs he had drawn today. Please call him again.

## 2021-10-06 NOTE — PROGRESS NOTES
[facility]     Outpatient Follow Up Note    Chilo Kim is 77 y.o. male who presents today with a history of CAD s/p CABG with TORREY ligation Jan '21 with post-op pleural effusion s/p thoracentesis;  HTN and hyperlipidemia      CHIEF COMPLAINT / HPI:  Follow Up secondary to calling 3 weeks ago with chest tightness / swollen that had been bothersome for 2 weeks earlier. Subjective:   He has tightness across his chest. It doesn't go away and he can feel it when he takes a deep breath. More so than feeling tight, something sticks him when he rolls to his right side. He denies (d) significant chest pain. His angina equivalent was feeling an ache in his left chest when exposed to cold air. He denies recurrence. There is no SOB/BOOTH. He gets a little winded on inclines. The patient denies orthopnea/PND. The patient has swelling in his Lt ankle since surgery. The patients weight is stable . The patient is not experiencing palpitations . He has some light headedness when standing. His home BP runs ~ 116/71    These symptoms show no change since the last OV. With regard to medication therapy the patient has been compliant with prescribed regimen. They have tolerated therapy to date.      Past Medical History:   Diagnosis Date    Coronary artery disease involving native heart     Essential hypertension 5/28/2021    Hyperlipidemia 12/21/2020     Social History:    Social History     Tobacco Use   Smoking Status Never Smoker   Smokeless Tobacco Never Used     Current Medications:  Current Outpatient Medications   Medication Sig Dispense Refill    aspirin 325 MG EC tablet TAKE 1 TABLET BY MOUTH EVERY DAY 90 tablet 1    metoprolol tartrate (LOPRESSOR) 50 MG tablet Take 1 tablet by mouth 2 times daily 180 tablet 3    amLODIPine (NORVASC) 2.5 MG tablet Take 1 tablet by mouth daily 90 tablet 3    atorvastatin (LIPITOR) 40 MG tablet Take 1 tablet by mouth nightly 90 tablet 3    acetaminophen (TYLENOL) 500 MG tablet Take 2 tablets by mouth every 6 hours 120 tablet 3    latanoprost (XALATAN) 0.005 % ophthalmic solution  (Patient not taking: Reported on 6/9/2021)       No current facility-administered medications for this visit. REVIEW OF SYSTEMS:    CONSTITUTIONAL: No major weight gain or loss, fatigue, weakness, night sweats or fever. HEENT: No new vision difficulties or ringing in the ears. RESPIRATORY: No new SOB, PND, orthopnea or cough. CARDIOVASCULAR: See HPI  GI: No nausea, vomiting, diarrhea, constipation, abdominal pain or changes in bowel habits. : No urinary frequency, urgency, incontinence hematuria or dysuria. SKIN: No cyanosis or skin lesions. MUSCULOSKELETAL: No new muscle or joint pain. NEUROLOGICAL: No syncope or TIA-like symptoms. PSYCHIATRIC: No anxiety, pain, insomnia or depression    Objective:   PHYSICAL EXAM:       Vitals:    10/06/21 1000 10/06/21 1001 10/06/21 1015   BP: (!) 90/50 92/70 102/62   Site: Right Upper Arm Right Upper Arm    Position: Sitting Standing    Cuff Size: Large Adult Large Adult    Pulse: 88     SpO2: 98%     Weight: 187 lb 7 oz (85 kg)     Height: 5' 7\" (1.702 m)          VITALS:  BP 92/70 (Site: Right Upper Arm, Position: Standing, Cuff Size: Large Adult)   Pulse 88   SpO2 98%   CONSTITUTIONAL: Cooperative, no apparent distress, and appears well nourished / developed  NEUROLOGIC:  Awake and orientated to person, place and time. PSYCH: Calm affect. SKIN: Warm and dry. HEENT: Sclera non-icteric, normocephalic, neck supple, no elevation of JVP, normal carotid pulses with no bruits and thyroid normal size. LUNGS:  No increased work of breathing and clear to auscultation, no crackles or wheezing  CARDIOVASCULAR:  Regular rate 80 and rhythm with no murmurs, gallops, rubs, or abnormal heart sounds, normal PMI. The apical impulses not displaced  JVP less than 8 cm H2O  Heart tones are crisp and normal  Cervical veins are not engorged  The carotid upstroke is followed by 40% proximal OM2   RCA: moderate sized vessel with 95% distal stenosis just prior to two posterolateral branches and RPDA; 90% mid acute marginal      LVEDP: 8 mmHg   LVEF: 65%     Limited Echo 1/22/21  Summary   -Normal left ventricle size, wall thickness, and systolic function with an   estimated ejection fraction of 55%.  -No regional wall motion abnormalities are seen.   -There is mild-to-moderate tricuspid regurgitation with a RVSP estimation of   33 mmHg.   -There is mild circumferential pericardial effusion noted    OT 02/02 - 3/03/2021- showed no atrial fibrillation, PVC< 1%, PAC <1%, high , Low HR 50, Average HR 77. Diary symptoms with Sinus Rhythm    CXR: 7/12/21:  FINDINGS:   HEART/MEDIASTINUM: The cardiomediastinal silhouette is within normal limits. Median sternotomy wires are again noted.  Atrial appendage clip again noted.       PLEURA/LUNGS: There are no focal consolidations or pleural effusions. There   is no appreciable pneumothorax.  Calcified granuloma again noted in the right   upper lobe.       BONES/SOFT TISSUE: No acute abnormality.           Impression   No radiographic evidence of acute pulmonary disease.             Assessment:      Diagnosis Orders   1. Chest tightness   ~atypical of angina  ~reproducible to palpitations and positioning  ~likely MCSK / lingering post-op changes  ~CXR  : unremarkable July  ~mild pericardial effusion on echo from Jan [address]   2. Coronary artery disease involving native coronary artery of native heart without angina pectoris   ~stable : denies recurrence of angina  ~s/p CABG Jan '21  ~normal LVEF  ~ASA / statin / BB    3. Essential hypertension   ~controlled on current regimen     4. Mixed hyperlipidemia   ~LDL and HDL not at goal  ~not checked after starting lipitor Comprehensive Metabolic Panel    Lipid, Fasting         I had the opportunity to review the clinical symptoms and presentation of Maranda Medina.    Plan: 1. CBC/ESR    CMP/lipid profile as routine (had an Ensure ~ 1 hr ago)  2. F/U in December as scheduled     Overall the patient is stable from CV standpoint    I have addresed the patient's cardiac risk factors and adjusted pharmacologic treatment as needed. In addition, I have reinforced the need for patient directed risk factor modification. Further evaluation will be based upon the patient's clinical course and testing results. All questions and concerns were addressed to the patient Alternatives to my treatment were discussed. The patient is not currently smoking. The risks related to smoking were reviewed with the patient. Recommend maintaining a smoke-free lifestyle. Patient is on a beta-blocker  Patient is not on an ace-i/ARB : neg CHF  Patient is on a statin     Antiplatelet therapy has been recommended / prescribed for this patient. Education conducted on adverse reactions including bleeding was discussed. The patient verbalizes understanding not to stop medications without discussing with us. Discussed exercise: 30-60 minutes 7 days/week : walks a lot: up/down steps. Declines cardiac rehab. Ended program early d/t didn't feel met his needs  Discussed Low saturated fat diet. Thank you for allowing to us to participate in the care of Paxton Cochran.     ALEXANDER Bonds    Documentation of today's visit sent to PCP

## 2021-12-08 ENCOUNTER — TELEPHONE (OUTPATIENT)
Dept: INTERNAL MEDICINE CLINIC | Age: 66
End: 2021-12-08

## 2021-12-08 NOTE — TELEPHONE ENCOUNTER
----- Message from Sabine Webb sent at 12/7/2021  1:44 PM EST -----  Subject: Appointment Request    Reason for Call: Routine Medicare AWV    QUESTIONS  Type of Appointment? Established Patient  Reason for appointment request? No appointments available during search  Additional Information for Provider? pt needs to schedule AWV no apts   available.   ---------------------------------------------------------------------------  --------------  CALL BACK INFO  What is the best way for the office to contact you? Do not leave any   message, patient will call back for answer  Preferred Call Back Phone Number? [phone]  ---------------------------------------------------------------------------  --------------  SCRIPT ANSWERS  Relationship to Patient? Self  (If the patient has Medicare as their primary insurance coverage ask this   question) Are you requesting a Medicare Annual Wellness Visit? Yes   (Is the patient requesting a pap smear with their physical exam?)? No  (Is the patient requesting their annual physical and does not need PAP or   AWV per above?)? No  Have you been diagnosed with, awaiting test results for, or told that you   are suspected of having COVID-19 (Coronavirus)? (If patient has tested   negative or was tested as a requirement for work, school, or travel and   not based on symptoms, answer no)? No  Within the past two weeks have you developed any of the following symptoms   (answer no if symptoms have been present longer than 2 weeks or began   more than 2 weeks ago)? Fever or Chills, Cough, Shortness of breath or   difficulty breathing, Loss of taste or smell, Sore throat, Nasal   congestion, Sneezing or runny nose, Fatigue or generalized body aches   (answer no if pain is specific to a body part e.g. back pain), Diarrhea,   Headache? No  Have you had close contact with someone with COVID-19 in the last 14 days?    No  (Service Expert  click yes below to proceed with [employer] As Usual Scheduling)?  Yes

## 2022-01-09 DIAGNOSIS — Z95.1 S/P CABG X 5: ICD-10-CM

## 2022-01-09 DIAGNOSIS — Z95.1 S/P CABG (CORONARY ARTERY BYPASS GRAFT): ICD-10-CM

## 2022-01-09 DIAGNOSIS — Z98.890 S/P THORACENTESIS: ICD-10-CM

## 2022-01-09 DIAGNOSIS — I48.19 ATRIAL FIBRILLATION, PERSISTENT (HCC): ICD-10-CM

## 2022-01-09 DIAGNOSIS — J90 PLEURAL EFFUSION: ICD-10-CM

## 2022-01-09 DIAGNOSIS — I31.39 PERICARDIAL EFFUSION: ICD-10-CM

## 2022-01-10 RX ORDER — AMLODIPINE BESYLATE 2.5 MG/1
TABLET ORAL
Qty: 90 TABLET | Refills: 3 | Status: SHIPPED | OUTPATIENT
Start: 2022-01-10 | End: 2022-06-28 | Stop reason: SDUPTHER

## 2022-01-10 NOTE — TELEPHONE ENCOUNTER
Patient:   PATRICIA LAINEZ            MRN: [id number]            FIN: [id number]               Age:   71 years     Sex:  MALE     :  47   Associated Diagnoses:   None   Author:   GONZALEZ NARAYAN      History of Present Illness             The patient presents with cough and shortness of breath for two weeks.  This is a 71 years old male patient of Dr. Caldwell and [facility] with a history of hypertension, dyslipidemia, DM II, CKD III, COPD, JORGE A on CPAP and paroxysmal atrial fibrillaton 5 years ago on no chronic anticoagulation.  The patient reports cough with shortness of breath for the last two weeks.  Over the last week he has become progressively dizzy with multiple falls.  Today he fell and hit his head on the couch.  He denies LOC.  He was unable to get up so EMS was called and he was transported to the ER.       Upon arrival to he is afebrile,  atrial fibrilation, RR 22, 96%, 107/97.  Labs: Na 134, K 4.1, BUN 52, creatinine 3.5, glucose 244, , alk phos 39, troponin 0.06 x2, hgb 12.1.  XR chest: Lungs are clear bilaterally without acute abnormality noted.  CT head: No acute intracranial abnormality.  He was given Diltiazem, Duo Nebs and IVF.  He is admitted with cardiology and nephrology on consult.       .        Review of Systems   Constitutional:  Weakness, Fatigue.    Eye:  Negative.    Ear/Nose/Mouth/Throat:  Negative.    Cardiovascular:  Palpitations, Tachycardia.    Respiratory:  Wheezing.    Gastrointestinal:  Negative.    Genitourinary:  Negative.    Musculoskeletal:  Negative.    Integumentary:  Negative.    Hematology/Lymphatics:  Negative.    Neurologic:  Alert and oriented X4.    Endocrine:  Negative.    Allergy/Immunologic:  Negative,    Allergies reviewed..    Psychiatric:  Negative.    All other systems All other systems are negative.     Histories   Past Med History: Past Medical History   Paroxysmal Afib  Arthritis  Diabetes mellitus type  Received refill request for Amlodipine from [facility].     Last ov:2021 RMM    Last EK2021    Last Refill:2021 #90 tabs w/ 3 refill    Next appointment:none II  Hyperlipemia  Hypertension  Neck pain  Neuropathy  JORGE A on CPAP   CKD     Family History:    Diabetes mellitus type 1  MOTHER  Hypertension  MOTHER       Procedure History:    left total hip arthroplasty.  finger surgery.  oral surgery for tooth extraction.  Total hip replacement ([id number]).  T10-T11 hemilaminectomy.   Social History        Alcohol  Details: Use: Current.  Frequency: Daily.  Home/Environment  Details: Alcohol Abuse in Household: No.  Substance Abuse in Household: No.  Smoker in Household: No.  Substance Abuse  Details: Use: None.  Tobacco  Details: Smoked/Smokeless Tobacco Last 30 Days: Yes.  Use: Current every day smoker.  Type: Cigarettes.  Cigarette Packs/Day: 1 Pack Per Day.  Details: Used in Last 12 Months: No.  Use: Former smoker.  Type: Cigarettes.  .        Health Status   Allergies:    Allergic Reactions (All)  NKA  Canceled/Inactive Reactions (All)  Severity Not Documented  Gabapentin- No reactions were documented.  Norvasc- No reactions were documented.   Current medications:  (Selected)   Documented Medications  Documented  Advair Diskus inhaler oral 500-50 mcg/puff: = 1 puff, Inhaled, BID, Inhalation, Maintenance  Crestor oral 20 mg tablet: 20 mg = 1 tab, Oral, Daily, Tab, # 30 tab, Maintenance  Lantus: 45 units, Subcutaneous, Q Bedtime, Maintenance  NovoLOG (insulin aspart) 100 units/mL injectable solution: 25 units, Subcutaneous, TID [before meals], Maintenance  Protonix oral 20 mg DR tablet: 20 mg = 1 tab, Oral, BID [before breakfast & dinner], Tab DR, Maintenance  Spiriva 18 mcg inhalation capsule: 18 mcg = 1 cap, Inhaled, Daily, Maintenance  Trilipix oral 135 mg DR capsule: 135 mg = 1 cap, Oral, Daily, Cap DR, # 30 cap, Maintenance  albuterol HFA inhaler oral 90 mcg/puff: = 1 puff, Inhaled, Once (scheduled), PRN for wheezing, Aerosol, # 18 gm, Maintenance  aspirin 81 mg oral tablet: 81 mg = 1 tab, Oral, Daily, Tab, Maintenance  cephalexin oral 500 mg capsule: 500 mg = 1 cap,  Oral, Q8H, Cap, Maintenance  gabapentin oral 300 mg capsule: 300 mg = 1 cap, Oral, Q8H, Cap, Maintenance  hydrocortisone topical 2.5% cream: = 1 application, Topical, TID, apply in a thin film to the affected skin and rub in gently and completely, Cream, Maintenance  ketoconazole topical 2% shampoo: = 1 application, Topical, 2 Times/week, Shampoo, Maintenance  losartan oral 50 mg tablet: 100 mg = 2 tab, Oral, Daily, Tab, Maintenance  magnesium oxide 420 mg oral tablet: 420 mg = 1 tab, Oral, Daily, Tab, Maintenance  sotalol oral 80 mg tablet: 80 mg = 1 tab, Oral, BID, Tab, Maintenance  tamsulosin oral 0.4 mg capsule: 0.4 mg = 1 cap, Oral, Q Bedtime, Cap, Maintenance,    No qualifying data available        Physical Examination   VS/Measurements        Vitals between:   20-MAY-2018 14:09:02   TO   21-MAY-2018 14:09:02                   LAST RESULT MINIMUM MAXIMUM  Temperature 37.2 37.2 37.2  Heart Rate 88 76 129  Respiratory Rate 15 14 28  NISBP           103 81 107  NIDBP           57 50 97  NIMBP           72 65 100  SpO2                    95 95 99  , Measurements from flowsheet : Height and Weight   05/21/18 12:13 CLINICALWEIGHT In Error kg  Normal (In Error)    Weight Method In Error (In Error)    MEDDOSEWT In Error kg  Normal (In Error)   05/21/18 09:32 CLINICALWEIGHT 112.1 kg     Weight Method Measured     MEDDOSEWT 112.1 kg       ,   I & O between:  20-MAY-2018 14:09 TO 21-MAY-2018 14:09  Med Dosing Weight:  112.1  kg   21-MAY-2018  24 Hour Intake:   2.00  ( 0.02 mL/kg )  24 Hour Output:   0.00           24 Hour Urine/Stool Output:   0.0  24 Hour Balance:   2.00           24 Hour Urine Output:   0.00  ( 0.00 mL/kg/hr )      General:  Alert and oriented, Mild distress, appears uncomfortable.    Eye:  Extraocular movements are intact, Normal conjunctiva, Vision unchanged.    HENT:  Normocephalic, dry mucous membranes.    Neck:  Supple, Non-tender, No lymphadenopathy, No thyromegaly.    Respiratory:  Breath sounds  are equal, Symmetrical chest wall expansion, No chest wall tenderness, coarse with expiratory wheezes.    Cardiovascular:  No murmur, No gallop, Good pulses equal in all extremities, Normal peripheral perfusion, No edema, atrial fibrillation, rate well controlled.    Gastrointestinal:  Soft, Non-tender, Normal bowel sounds, obese  .    Genitourinary:  No costovertebral angle tenderness.    Musculoskeletal:  NV intact, no calf pain or tenderness, negative homans sign.    Integumentary:  Warm, Dry, Pink.    Neurologic:  Alert, Oriented, Normal sensory, Cranial Nerves II-XII are grossly intact.    Cognition and Speech:  Oriented, Speech clear and coherent, Functional cognition intact.    Psychiatric:  Cooperative, Appropriate mood & affect.       Review / Management   Laboratory results:       Labs between:  20-MAY-2018 14:09 to 21-MAY-2018 14:09    CBC:                 WBC  HgB  Hct  Plt  MCV  RDW   21-MAY-2018 7.0  (L) 12.1  (L) 34.9  181  87.7  14.1     DIFF:                 Seg  Neutroph//ABS  Lymph//ABS  Mono//ABS  EOS/ABS   21-MAY-2018 NOT APPLICABLE  86 // 6.0 7 // (L) 0.5  7 // 0.5 0 // (L) 0.0     BMP:                 Na  Cl  BUN  Glu   21-MAY-2018 (L) 134  101  (H) 52  (H) 244                              K  CO2  Cr  Ca                              4.1  (L) 18  (H) 3.50  8.7     CMP:                 AST  ALT  AlkPhos  Bili  Albumin   21-MAY-2018 (H) 153  43  (L) 39  0.7  (L) 3.1                  .    Radiology results     Result title:  CT HEAD OR BRAIN WO CON  Result status:  Final  Verified by:  RADHA MOSLEY on 05/21/2018   IMPRESSION:No acute intracranial abnormalities.              Result title:  XR CHEST PORTABLE 1V  Result status:  Final  Verified by:  YANIRA GARCIA on 05/21/2018   IMPRESSION: Lungs are clear bilaterally, without acute abnormality noted.         Impression and Plan   Dx and Plan:  Diagnosis     Acute COPD exacerbation, present on admission   XR chest: Lungs clear  bilaterally without acute abnormality   Methylprednisolone IVP, Duo Nebs scheduled   Resume home inhalers, not currently requiring oxygen   IS, cough and deep breathing    Weakness and falls with global deconditioning   CT head: No acute abnormality   No loss of consciousness   PT/OT eval and treat     Paroxysmal atrial fibrillation   12 lead EKG Afib with RVR, inferior and lateral T wave abnormality   Troponin 0.06 x2 given in ER   Diltiazem given in ER   Rate currently well controlled   Anticoagulation per cardiology   Dr. Caldwell on consult    Primary hypertension   Resume home meds    Dyslipidemia   Continue crestor, fenofibrate   , Alk Phos 39   Repeat CMP In AM    Elevated troponin likely related to atrial fibrillation with RAMÓN on CKD   12 lead EKG as above   Troponin 0.06 x2, denies chest pain or pressure   Cardiology following   Fasting lipids    RAMNÓ on CKD III   Creatinine 1-1.8 in 6724-5952   Creatinine 1.5 two weeks ago with Dr. Glover    Creatinine 3.5 today   Dr. Glover consulted    DM II with hyperglycemia   Glucose 244   Diabetic diet   Accuchek QID, SS insulin, Lantus per home regimen   Hemoglobin A1C pending    Diabetic neuropathy   Continue home Gabapentin    Obstructive sleep apnea on home CPAP   Encourage continued compliance    Osteoarthritis- continue PO PRN pain meds    DVT ppx: SCDs, heparin  Pulm: Nebs, IS, cough and deep breathing    PCP: [facility]    Dispo: Pending inpatient course    Care overseen by and discussed with Dr. Telly Rousseau MSN, [facility]  Best Practices Inpatient Care  [phone]    INPATIENT CERTIFICATION OF MEDICAL NECESSITY    I certify that hospital services are reasonable and necessary for this patient and will be appropriately provided as inpatient services in accordance with the CMS 2-midnight benchmark under 42 .3(e).    This patient requires inpatient hospital services for medical treatment or medically required inpatient  diagnostic study.  The reason this patient requires hospital services is because [ ]    This patients length of stay is estimated to be [ ] midnights.    The plans for this patients post-hospital care are expected to be (multiple select):  [__]  Home - no services  [_x_]  Home Health Care  [_x_]  Skilled Nursing Facility  [__]  To be determined  [__] Other ______________                   .      Acute COPD exacerbation, present on admission   XR chest: Lungs clear bilaterally without acute abnormality   Methylprednisolone IVP, Duo Nebs scheduled   Resume home inhalers, not currently requiring oxygen   IS, cough and deep breathing    Weakness and falls with global deconditioning   CT head: No acute abnormality   No loss of consciousness   PT/OT eval and treat     Paroxysmal atrial fibrillation   12 lead EKG Afib with RVR, inferior and lateral T wave abnormality   Troponin 0.06 x2 given in ER   Diltiazem given in ER   Rate currently well controlled   Dr. Caldwell on consult    Primary hypertension   Resume home meds    Dyslipidemia   Continue crestor, fenofibrate   , Alk Phos 39   Repeat CMP In AM    Elevated troponin likely related to atrial fibrillation with RAMÓN on CKD   12 lead EKG as above   Troponin 0.06 x2, denies chest pain or pressure   Cardiology following   Fasting lipids    RAMÓN on CKD III   Creatinine 1-1.8 in 2414-1686   Creatinine 1.5 two weeks ago with Dr. Glover    Creatinine 3.5 today   Dr. Glover consulted    DM II with hyperglycemia   Glucose 244   Diabetic diet   Accuchek QID, SS insulin, Lantus per home regimen   Hemoglobin A1C pending    Diabetic neuropathy   Continue home Gabapentin    Obstructive sleep apnea on home CPAP   Encourage continued compliance    Osteoarthritis- continue PO PRN pain meds    DVT ppx: SCDs, heparin  Pulm: Nebs, IS, cough and deep breathing    PCP: [facility]    Dispo: Pending inpatient course    Care overseen by and discussed with Dr. Telly MERCADO  [place] MSN, [facility]  Best Practices Inpatient Care  [phone]    INPATIENT CERTIFICATION OF MEDICAL NECESSITY    I certify that hospital services are reasonable and necessary for this patient and will be appropriately provided as inpatient services in accordance with the CMS 2-midnight benchmark under 42 .3(e).    This patient requires inpatient hospital services for medical treatment or medically required inpatient diagnostic study.  The reason this patient requires hospital services is because [ ]    This patients length of stay is estimated to be [ ] midnights.    The plans for this patients post-hospital care are expected to be (multiple select):  [__]  Home - no services  [_x_]  Home Health Care  [_x_]  Skilled Nursing Facility  [__]  To be determined  [__] Other ______________                   .       Acute COPD exacerbation, present on admission   XR chest: Lungs clear bilaterally without acute abnormality   Methylprednisolone IVP, Duo Nebs scheduled   Resume home inhalers, not currently requiring oxygen   IS, cough and deep breathing    Weakness and falls with global deconditioning   CT head: No acute abnormality   No loss of consciousness   PT/OT eval and treat     Paroxysmal atrial fibrillation   12 lead EKG Afib with RVR, inferior and lateral T wave abnormality   Troponin 0.06 x2 given in ER   Diltiazem given in ER   Rate currently well controlled   Dr. Caldwell on consult    Primary hypertension   Resume home meds    Dyslipidemia   Continue crestor, fenofibrate   , Alk Phos 39   Repeat CMP In AM    Elevated troponin likely related to atrial fibrillation with RAMÓN on CKD   12 lead EKG as above   Troponin 0.06 x2, denies chest pain or pressure   Cardiology following   Fasting lipids    RAMÓN on CKD III   Creatinine 1-1.8 in 8783-3687   Creatinine 1.5 two weeks ago with Dr. Glover    Creatinine 3.5 today   Dr. Glover consulted    DM II with hyperglycemia   Glucose 244   Diabetic diet   Accuchek  QID, SS insulin, Lantus per home regimen   Hemoglobin A1C pending    Diabetic neuropathy   Continue home Gabapentin    Obstructive sleep apnea on home CPAP   Encourage continued compliance    Osteoarthritis- continue PO PRN pain meds    DVT ppx: SCDs, heparin  Pulm: Nebs, IS, cough and deep breathing    [facility]    Dispo: Pending inpatient course    Care overseen by and discussed with Dr. Telly Rousseau MSN, [facility]  Best Practices Inpatient Care  [phone]              .      .             Electronically Signed On 05/21/2018 14:39  __________________________________________________   KELY-NPGONZALEZ      Electronically Signed On 05/21/2018 14:56  __________________________________________________   KELY-NPGONZALEZ      Electronically Signed On 05/21/2018 15:07  __________________________________________________   KELY-GONZALEZ LOPEZ      Electronically Signed On 05/21/2018 15:21  __________________________________________________   LEOBARDO KOVACS have seen and examined the patient.  Agree with H&P and assessment and plan as documented by NP Gonzalez Rousseau.  Patient's generalized weakness may be related to his RAMÓN on CKD stage 3 as well as his acute COPD exacerbation.  Begin treatment of his COPD exacerbation w/ nebulizers and IV Solumedrol.  Begin IVF's (NS@80 cc/hr) for his RAMÓN on CKD.  Check UA w/ reflex cx, TSH w/ reflex, urine electrolytes, and HgbA1C.  Will order PT/OT assessment and increase pt's activity as able.  Nephrology consulted for assistance w/ evaluation/mgmt, await and appreciate input.           Electronically Signed On 05/21/2018 15:21  __________________________________________________   LEOBARDO KOVACS              60 min total time spent in discussion of care w/ pt and son at bedside and w/ discussion/coordination of care w/ specialists and care team           Electronically Signed On 05/21/2018 17:06  __________________________________________________   FERMÍN  LEOBARDO

## 2022-02-23 ENCOUNTER — OFFICE VISIT (OUTPATIENT)
Dept: INTERNAL MEDICINE CLINIC | Age: 67
End: 2022-02-23
Payer: MEDICAID

## 2022-02-23 VITALS
HEART RATE: 85 BPM | TEMPERATURE: 97.8 F | HEIGHT: 67 IN | OXYGEN SATURATION: 97 % | BODY MASS INDEX: 29.6 KG/M2 | WEIGHT: 188.6 LBS | DIASTOLIC BLOOD PRESSURE: 72 MMHG | SYSTOLIC BLOOD PRESSURE: 102 MMHG

## 2022-02-23 DIAGNOSIS — I48.0 PAF (PAROXYSMAL ATRIAL FIBRILLATION) (HCC): ICD-10-CM

## 2022-02-23 DIAGNOSIS — R97.20 ELEVATED PSA: ICD-10-CM

## 2022-02-23 DIAGNOSIS — E78.5 DYSLIPIDEMIA: ICD-10-CM

## 2022-02-23 DIAGNOSIS — Z00.00 MEDICARE ANNUAL WELLNESS VISIT, SUBSEQUENT: Primary | ICD-10-CM

## 2022-02-23 DIAGNOSIS — R73.9 HYPERGLYCEMIA: ICD-10-CM

## 2022-02-23 DIAGNOSIS — I10 ESSENTIAL HYPERTENSION: ICD-10-CM

## 2022-02-23 DIAGNOSIS — R06.2 WHEEZING: ICD-10-CM

## 2022-02-23 LAB
A/G RATIO: 1.4 (ref 1.1–2.2)
ALBUMIN SERPL-MCNC: 4.3 G/DL (ref 3.4–5)
ALP BLD-CCNC: 137 U/L (ref 40–129)
ALT SERPL-CCNC: 35 U/L (ref 10–40)
ANION GAP SERPL CALCULATED.3IONS-SCNC: 13 MMOL/L (ref 3–16)
AST SERPL-CCNC: 23 U/L (ref 15–37)
BILIRUB SERPL-MCNC: 0.6 MG/DL (ref 0–1)
BUN BLDV-MCNC: 13 MG/DL (ref 7–20)
CALCIUM SERPL-MCNC: 9.4 MG/DL (ref 8.3–10.6)
CHLORIDE BLD-SCNC: 104 MMOL/L (ref 99–110)
CHOLESTEROL, TOTAL: 103 MG/DL (ref 0–199)
CO2: 25 MMOL/L (ref 21–32)
CREAT SERPL-MCNC: 1 MG/DL (ref 0.8–1.3)
GFR AFRICAN AMERICAN: >60
GFR NON-AFRICAN AMERICAN: >60
GLUCOSE BLD-MCNC: 91 MG/DL (ref 70–99)
HDLC SERPL-MCNC: 41 MG/DL (ref 40–60)
LDL CHOLESTEROL CALCULATED: 41 MG/DL
POTASSIUM SERPL-SCNC: 4 MMOL/L (ref 3.5–5.1)
PROSTATE SPECIFIC ANTIGEN: 5.06 NG/ML (ref 0–4)
SODIUM BLD-SCNC: 142 MMOL/L (ref 136–145)
TOTAL PROTEIN: 7.4 G/DL (ref 6.4–8.2)
TRIGL SERPL-MCNC: 104 MG/DL (ref 0–150)
VLDLC SERPL CALC-MCNC: 21 MG/DL

## 2022-02-23 PROCEDURE — G0439 PPPS, SUBSEQ VISIT: HCPCS | Performed by: INTERNAL MEDICINE

## 2022-02-23 ASSESSMENT — PATIENT HEALTH QUESTIONNAIRE - PHQ9
SUM OF ALL RESPONSES TO PHQ9 QUESTIONS 1 & 2: 0
1. LITTLE INTEREST OR PLEASURE IN DOING THINGS: 0
SUM OF ALL RESPONSES TO PHQ QUESTIONS 1-9: 0
2. FEELING DOWN, DEPRESSED OR HOPELESS: 0
SUM OF ALL RESPONSES TO PHQ QUESTIONS 1-9: 0

## 2022-02-23 ASSESSMENT — LIFESTYLE VARIABLES
HOW OFTEN DO YOU HAVE A DRINK CONTAINING ALCOHOL: NEVER
HOW MANY STANDARD DRINKS CONTAINING ALCOHOL DO YOU HAVE ON A TYPICAL DAY: 1 OR 2

## 2022-02-23 NOTE — PATIENT INSTRUCTIONS
Personalized Preventive Plan for João Gonzalez - 2/23/2022  Medicare offers a range of preventive health benefits. Some of the tests and screenings are paid in full while other may be subject to a deductible, co-insurance, and/or copay. Some of these benefits include a comprehensive review of your medical history including lifestyle, illnesses that may run in your family, and various assessments and screenings as appropriate. After reviewing your medical record and screening and assessments performed today your provider may have ordered immunizations, labs, imaging, and/or referrals for you. A list of these orders (if applicable) as well as your Preventive Care list are included within your After Visit Summary for your review. Other Preventive Recommendations:    · A preventive eye exam performed by an eye specialist is recommended every 1-2 years to screen for glaucoma; cataracts, macular degeneration, and other eye disorders. · A preventive dental visit is recommended every 6 months. · Try to get at least 150 minutes of exercise per week or 10,000 steps per day on a pedometer . · Order or download the FREE \"Exercise & Physical Activity: Your Everyday Guide\" from [employer]. Call 7-125.975.4214 or search [employer] online. · You need 3911-4990 mg of calcium and 8185-2526 IU of vitamin D per day. It is possible to meet your calcium requirement with diet alone, but a vitamin D supplement is usually necessary to meet this goal.  · When exposed to the sun, use a sunscreen that protects against both UVA and UVB radiation with an SPF of 30 or greater. Reapply every 2 to 3 hours or after sweating, drying off with a towel, or swimming. · Always wear a seat belt when traveling in a car. Always wear a helmet when riding a bicycle or motorcycle.

## 2022-02-23 NOTE — PROGRESS NOTES
Medicare Annual Wellness Visit    Saray Troy is here for Medicare AWV    [address] was seen today for medicare awv. Diagnoses and all orders for this visit:    Medicare annual wellness visit, subsequent    Wheezing  -     Full PFT Study With Bronchodilator; Future    PAF (paroxysmal atrial fibrillation) (HCC)  Stable  -  Currently on sinus  -   Continue metoprolol    Dyslipidemia  -     Lipid Panel; Future    Essential hypertension  -     Comprehensive Metabolic Panel; Future    Hyperglycemia  -     Hemoglobin A1C; Future    Elevated PSA  -     PSA, Prostatic Specific Antigen; Future         Recommendations for Preventive Services Due: see orders and patient instructions/AVS.  Recommended screening schedule for the next 5-10 years is provided to the patient in written form: see Patient Instructions/AVS.     Return in 3 months (on 5/23/2022) for CAD 30 min. Reviewed and updated this visit:  Tobacco  Allergies  Med Hx  Surg Hx  Soc Hx  Fam Hx      Subjective   Patient's complete Health Risk Assessment and screening values have been reviewed and are found in Flowsheets. The following problems were reviewed today and where indicated follow up appointments were made and/or referrals ordered.     Positive Risk Factor Screenings with Interventions:               General Health and ACP:  General  In general, how would you say your health is?: Good  In the past 7 days, have you experienced any of the following: New or Increased Pain, New or Increased Fatigue, Loneliness, Social Isolation, Stress or Anger?: No  Do you get the social and emotional support that you need?: Yes  Do you have a Living Will?: (!) No    Advance Directives     Power of  Living Will ACP-Advance Directive ACP-Power of     Not on File Not on File Not on File Not on File      General Health Risk Interventions:  · No Living Will: Advance Care Planning addressed with patient today    Health Habits/Nutrition: Physical Activity: Inactive    Days of Exercise per Week: 0 days    Minutes of Exercise per Session: 0 min     Have you lost any weight without trying in the past 3 months?: No  Body mass index: (!) 29.54  Have you seen the dentist within the past year?: (!) No    Health Habits/Nutrition Interventions:  · Inadequate physical activity:  patient agrees to exercise for at least 150 minutes/week, patient agrees to wear a pedometer and walk at least 10,000 steps/day          Objective   Vitals:    02/23/22 1511   BP: 102/72   Site: Left Upper Arm   Position: Sitting   Cuff Size: Large Adult   Pulse: 85   Temp: 97.8 °F (36.6 °C)   TempSrc: Infrared   SpO2: 97%   Weight: 188 lb 9.6 oz (85.5 kg)   Height: 5' 7\" (1.702 m)      Body mass index is 29.54 kg/m².         General Appearance: alert and oriented to person, place and time, well developed and well- nourished, in no acute distress  Skin: warm and dry, no rash or erythema  Head: normocephalic and atraumatic  Eyes: pupils equal, round, and reactive to light, extraocular eye movements intact, conjunctivae normal  ENT: tympanic membrane, external ear and ear canal normal bilaterally, nose without deformity, nasal mucosa and turbinates normal without polyps  Neck: supple and non-tender without mass, no thyromegaly or thyroid nodules, no cervical lymphadenopathy  Pulmonary/Chest: clear to auscultation bilaterally- no wheezes, rales or rhonchi, normal air movement, no respiratory distress  Cardiovascular: normal rate, regular rhythm, normal S1 and S2, no murmurs, rubs, clicks, or gallops, distal pulses intact, no carotid bruits  Abdomen: soft, non-tender, non-distended, normal bowel sounds, no masses or organomegaly  Extremities: no cyanosis, clubbing or edema  Musculoskeletal: normal range of motion, no joint swelling, deformity or tenderness  Neurologic: reflexes normal and symmetric, no cranial nerve deficit, gait, coordination and speech normal       No Known Allergies  Prior to Visit Medications    Medication Sig Taking?  Authorizing Provider   aspirin 325 MG EC tablet TAKE 1 TABLET BY MOUTH EVERY DAY Yes Cherri Haywood, DO   amLODIPine (NORVASC) 2.5 MG tablet TAKE 1 TABLET BY MOUTH EVERY DAY Yes ALEXANDER Palacios CNP   Nutritional Supplements (ENSURE PO) Take 1 Can by mouth daily Yes Historical Provider, MD   metoprolol tartrate (LOPRESSOR) 50 MG tablet Take 1 tablet by mouth 2 times daily Yes [facility] ([place]), DO   atorvastatin (LIPITOR) 40 MG tablet Take 1 tablet by mouth nightly Yes Arlin Camara MD   acetaminophen (TYLENOL) 500 MG tablet Take 2 tablets by mouth every 6 hours Yes ALEXANDER Valle CNP   latanoprost (XALATAN) 0.005 % ophthalmic solution Place 1 drop into both eyes nightly  Yes Historical Provider, MD [place] (Including outside providers/suppliers regularly involved in providing care):   Patient Care Team:  Harjinder Gonzalez MD as PCP - General (Pediatrics)  Harjinder Gonzalez MD as PCP - [facility] Empaneled Provider  Reji Zimmerman MD as Consulting Physician (Otolaryngology)  Adeel Taylor MD as Consulting Physician (General Surgery)

## 2022-02-24 LAB
ESTIMATED AVERAGE GLUCOSE: 134.1 MG/DL
HBA1C MFR BLD: 6.3 %

## 2022-05-02 RX ORDER — ATORVASTATIN CALCIUM 40 MG/1
TABLET, FILM COATED ORAL
Qty: 90 TABLET | Refills: 3 | OUTPATIENT
Start: 2022-05-02

## 2022-05-02 NOTE — TELEPHONE ENCOUNTER
[facility] :  10/06/2021 with NPTS    Last Labs : 02/23/2021 LIPID & CMP    Last Refill : 04/14/2021 90 w/3 refills    Next OV : No scheduled OV

## 2022-05-09 ENCOUNTER — TELEPHONE (OUTPATIENT)
Dept: CARDIOLOGY CLINIC | Age: 67
End: 2022-05-09

## 2022-05-09 NOTE — TELEPHONE ENCOUNTER
Per [facility], patient should be scheduled an appt with JOSE MANUEL to assess cp and address if patient needs a stress test. I relayed this to the patient and he expressed understanding.  Appt made with JOSE MANUEL 5/11 at 930 am. Instructed to go to ER if symptoms worsen in the meantime

## 2022-05-09 NOTE — TELEPHONE ENCOUNTER
Pt stated he has notice tightness in his chest for about month now and seems to be getting mor intense, pt has also notice SOB more often, pt wants to know if this is normal after surgery?     Pls advise thank you     Sunny Freitas  [phone]

## 2022-05-11 ENCOUNTER — OFFICE VISIT (OUTPATIENT)
Dept: CARDIOLOGY CLINIC | Age: 67
End: 2022-05-11
Payer: MEDICAID

## 2022-05-11 VITALS
OXYGEN SATURATION: 97 % | BODY MASS INDEX: 29.51 KG/M2 | HEIGHT: 67 IN | DIASTOLIC BLOOD PRESSURE: 62 MMHG | HEART RATE: 68 BPM | WEIGHT: 188 LBS | SYSTOLIC BLOOD PRESSURE: 102 MMHG

## 2022-05-11 DIAGNOSIS — R06.02 SOB (SHORTNESS OF BREATH): ICD-10-CM

## 2022-05-11 DIAGNOSIS — R07.9 CHEST PAIN, UNSPECIFIED TYPE: Primary | ICD-10-CM

## 2022-05-11 DIAGNOSIS — E78.2 MIXED HYPERLIPIDEMIA: ICD-10-CM

## 2022-05-11 DIAGNOSIS — I25.10 CORONARY ARTERY DISEASE INVOLVING NATIVE CORONARY ARTERY OF NATIVE HEART WITHOUT ANGINA PECTORIS: ICD-10-CM

## 2022-05-11 DIAGNOSIS — I10 ESSENTIAL HYPERTENSION: ICD-10-CM

## 2022-05-11 PROCEDURE — 93000 ELECTROCARDIOGRAM COMPLETE: CPT | Performed by: NURSE PRACTITIONER

## 2022-05-11 PROCEDURE — 99214 OFFICE O/P EST MOD 30 MIN: CPT | Performed by: NURSE PRACTITIONER

## 2022-05-11 NOTE — PROGRESS NOTES
[address]     Outpatient Follow Up Note    Zeny Israel is 77 y.o. male who presents today with a history of CAD s/p CABG with TORREY ligation Jan '21 with post-op pleural effusion s/p thoracentesis;  HTN and hyperlipidemia      CHIEF COMPLAINT / HPI:  Follow Up secondary to calling with c/o chest discomfort. Subjective:   His chest feels full when he inhales. He noticed it about 3 weeks. He's had no fever / cough  He has no SOB unless he goes up hills. He walks further now that the weather is better. He denies (d) significant chest pain. His angina equivalent was feeling an ache in his left chest when exposed to cold air. He denies recurrence. The patient denies orthopnea/PND. His legs look more puffy yet he still can get his shoes on. The patients weight is stable . The patient is not experiencing palpitations / light headedness   His home BP runs ~ 110-116/78    These symptoms show no change since the last OV with similar symptoms. With regard to medication therapy the patient has been compliant with prescribed regimen. They have tolerated therapy to date.      Past Medical History:   Diagnosis Date    Coronary artery disease involving native heart     Essential hypertension 5/28/2021    Hyperlipidemia 12/21/2020     Social History:    Social History     Tobacco Use   Smoking Status Never Smoker   Smokeless Tobacco Never Used     Current Medications:  Current Outpatient Medications   Medication Sig Dispense Refill    aspirin 325 MG EC tablet TAKE 1 TABLET BY MOUTH EVERY DAY 90 tablet 1    amLODIPine (NORVASC) 2.5 MG tablet TAKE 1 TABLET BY MOUTH EVERY DAY 90 tablet 3    Nutritional Supplements (ENSURE PO) Take 1 Can by mouth daily      metoprolol tartrate (LOPRESSOR) 50 MG tablet Take 1 tablet by mouth 2 times daily 180 tablet 3    atorvastatin (LIPITOR) 40 MG tablet Take 1 tablet by mouth nightly 90 tablet 3    latanoprost (XALATAN) 0.005 % ophthalmic solution Place 1 drop into both eyes nightly       acetaminophen (TYLENOL) 500 MG tablet Take 2 tablets by mouth every 6 hours (Patient not taking: Reported on 5/11/2022) 120 tablet 3     No current facility-administered medications for this visit. REVIEW OF SYSTEMS:    CONSTITUTIONAL: No major weight gain or loss, fatigue, weakness, night sweats or fever. HEENT: No new vision difficulties or ringing in the ears. RESPIRATORY: No new SOB, PND, orthopnea or cough. CARDIOVASCULAR: See HPI  GI: No nausea, vomiting, diarrhea, constipation, abdominal pain or changes in bowel habits. : No urinary frequency, urgency, incontinence hematuria or dysuria. SKIN: No cyanosis or skin lesions. MUSCULOSKELETAL: No new muscle or joint pain. NEUROLOGICAL: No syncope or TIA-like symptoms. PSYCHIATRIC: No anxiety, pain, insomnia or depression    Objective:   PHYSICAL EXAM:       Vitals:    05/11/22 0953 05/11/22 1007   BP: 102/60 102/62   Site: Left Upper Arm Left Upper Arm   Position: Sitting    Cuff Size: Large Adult Large Adult   Pulse: 68    SpO2: 97%    Weight: 188 lb (85.3 kg)    Height: 5' 7\" (1.702 m)         VITALS:  /62 (Site: Left Upper Arm, Cuff Size: Large Adult)   Pulse 68   Ht 5' 7\" (1.702 m)   Wt 188 lb (85.3 kg)   SpO2 97%   BMI 29.44 kg/m²   CONSTITUTIONAL: Cooperative, no apparent distress, and appears well nourished / developed  NEUROLOGIC:  Awake and orientated to person, place and time. PSYCH: Calm affect. SKIN: Warm and dry. HEENT: Sclera non-icteric, normocephalic, neck supple, no elevation of JVP, normal carotid pulses with no bruits and thyroid normal size. LUNGS:  No increased work of breathing and clear to auscultation, no crackles or wheezing  CARDIOVASCULAR:  Regular rate 68 and rhythm with no murmurs, gallops, rubs, or abnormal heart sounds, normal PMI. The apical impulses not displaced  JVP less than 8 cm H2O  Heart tones are crisp and normal  Cervical veins are not engorged  The carotid upstroke is normal in amplitude and contour without delay or bruit  JVP is not elevated  ABDOMEN:  Normal bowel sounds, non-distended and non-tender to palpation  EXT: No edema, no calf tenderness. Pulses are present bilaterally. DATA:    Lab Results   Component Value Date    ALT 35 02/23/2022    AST 23 02/23/2022    ALKPHOS 137 (H) 02/23/2022    BILITOT 0.6 02/23/2022     Lab Results   Component Value Date    CREATININE 1.0 02/23/2022    BUN 13 02/23/2022     02/23/2022    K 4.0 02/23/2022     02/23/2022    CO2 25 02/23/2022       No components found for: CHLPL  Lab Results   Component Value Date    TRIG 104 02/23/2022    TRIG 77 01/07/2021    TRIG 87 12/14/2020     Lab Results   Component Value Date    HDL 41 02/23/2022    HDL 39 (L) 10/06/2021    HDL 38 (L) 01/07/2021     Lab Results   Component Value Date    LDLCALC 41 02/23/2022    LDLCALC 42 10/06/2021    LDLCALC 113 (H) 01/07/2021     Lab Results   Component Value Date    LABVLDL 21 02/23/2022    LABVLDL 25 10/06/2021    LABVLDL 15 01/07/2021     Radiology Review:  Pertinent images / reports were reviewed as a part of this visit and reveals the following:    ECHO 1/5/21  Summary   -Normal left ventricle size, wall thickness and systolic function with an   estimated ejection fraction of 55%.    -[place] is not well visualized.   -E/e\"= 11.6 .   -Normal diastolic function.   - Mild mitral regurgitation.   -Mild tricuspid regurgitation.   -Pasp 31mmHg.     1/7/21  Left Heart Cath  Dominance: Co      LM: short, MLI  LAD: 80% short, ostial stenosis; three moderate sized diagonals with 90% ostial to proximal  D2 and 70% ostial D3; 40% mid LAD disease  LCx: large first marginal free of significant disease; 85% eccentric mid stenosis followed by 40% proximal OM2   RCA: moderate sized vessel with 95% distal stenosis just prior to two posterolateral branches and RPDA; 90% mid acute marginal      LVEDP: 8 mmHg   LVEF: 65%     Limited Echo 1/22/21  Summary   -Normal left ventricle size, wall thickness, and systolic function with an   estimated ejection fraction of 55%.  -No regional wall motion abnormalities are seen.   -There is mild-to-moderate tricuspid regurgitation with a RVSP estimation of   33 mmHg.   -There is mild circumferential pericardial effusion noted    OT 02/02 - 3/03/2021- showed no atrial fibrillation, PVC< 1%, PAC <1%, high , Low HR 50, Average HR 77. Diary symptoms with Sinus Rhythm      Assessment:      Diagnosis Orders   1. Chest tightness   ~atypical experienced with inspiration   ~atypical of angina        2. Coronary artery disease involving native coronary artery of native heart without angina pectoris   ~stable : denies angina  ~s/p CABG Jan '21  ~normal LVEF  ~ASA / statin / BB    3. Primary  hypertension   ~controlled     4. Mixed hyperlipidemia   ~controlled          I had the opportunity to review the clinical symptoms and presentation of Abundio & Lorna. Plan:     1. CBC/ESR/CMP/BNP   Exercise myoview as recommended   2. F/U in four weeks / test dependent     Overall the patient is stable from CV standpoint    I have addresed the patient's cardiac risk factors and adjusted pharmacologic treatment as needed. In addition, I have reinforced the need for patient directed risk factor modification. Further evaluation will be based upon the patient's clinical course and testing results. All questions and concerns were addressed to the patient Alternatives to my treatment were discussed. The patient is not currently smoking. The risks related to smoking were reviewed with the patient. Recommend maintaining a smoke-free lifestyle. Patient is on a beta-blocker  Patient is not on an ace-i/ARB : neg CHF  Patient is on a statin     Antiplatelet therapy has been recommended / prescribed for this patient. Education conducted on adverse reactions including bleeding was discussed.     The patient verbalizes understanding not to stop medications without discussing with us. Discussed exercise: 30-60 minutes 7 days/week : walks a lot  Discussed Low saturated fat diet. Thank you for allowing to us to participate in the care of Gallo Rashid.     ALEXANDER Morgan    Documentation of today's visit sent to PCP

## 2022-05-11 NOTE — PATIENT INSTRUCTIONS
Labs today     Exercise myoview stress test as recommended by Dr. Ni jolley in four weeks / test dependent

## 2022-05-23 ENCOUNTER — HOSPITAL ENCOUNTER (OUTPATIENT)
Dept: NON INVASIVE DIAGNOSTICS | Age: 67
Discharge: HOME | End: 2022-05-23
Payer: MEDICAID

## 2022-05-23 LAB
LV EF: 61 %
LVEF MODALITY: NORMAL

## 2022-05-23 PROCEDURE — 3430000000 HC RX DIAGNOSTIC RADIOPHARMACEUTICAL: Performed by: INTERNAL MEDICINE

## 2022-05-23 PROCEDURE — 93017 CV STRESS TEST TRACING ONLY: CPT | Performed by: INTERNAL MEDICINE

## 2022-05-23 PROCEDURE — 78452 HT MUSCLE IMAGE SPECT MULT: CPT | Performed by: INTERNAL MEDICINE

## 2022-05-23 PROCEDURE — A9502 TC99M TETROFOSMIN: HCPCS | Performed by: INTERNAL MEDICINE

## 2022-05-23 RX ADMIN — TETROFOSMIN 10 MILLICURIE: 1.38 INJECTION, POWDER, LYOPHILIZED, FOR SOLUTION INTRAVENOUS at 08:54

## 2022-05-23 RX ADMIN — TETROFOSMIN 30 MILLICURIE: 1.38 INJECTION, POWDER, LYOPHILIZED, FOR SOLUTION INTRAVENOUS at 11:08

## 2022-05-23 NOTE — PROGRESS NOTES
Patient instructed on Antolin Protocol Stress Test Procedure including possible side effects and adverse reactions. Verbalizes knowledge and understanding and denies having any questions.

## 2022-06-09 ENCOUNTER — OFFICE VISIT (OUTPATIENT)
Dept: CARDIOLOGY CLINIC | Age: 67
End: 2022-06-09
Payer: MEDICAID

## 2022-06-09 ENCOUNTER — HOSPITAL ENCOUNTER (OUTPATIENT)
Dept: GENERAL RADIOLOGY | Age: 67
Discharge: HOME | End: 2022-06-09
Payer: MEDICAID

## 2022-06-09 ENCOUNTER — HOSPITAL ENCOUNTER (OUTPATIENT)
Age: 67
Discharge: HOME | End: 2022-06-09
Payer: MEDICAID

## 2022-06-09 VITALS
HEART RATE: 63 BPM | OXYGEN SATURATION: 98 % | BODY MASS INDEX: 29.82 KG/M2 | WEIGHT: 190 LBS | HEIGHT: 67 IN | SYSTOLIC BLOOD PRESSURE: 104 MMHG | DIASTOLIC BLOOD PRESSURE: 64 MMHG

## 2022-06-09 DIAGNOSIS — R06.02 SOB (SHORTNESS OF BREATH): ICD-10-CM

## 2022-06-09 DIAGNOSIS — R07.9 CHEST PAIN, UNSPECIFIED TYPE: ICD-10-CM

## 2022-06-09 DIAGNOSIS — R07.9 CHEST PAIN, UNSPECIFIED TYPE: Primary | ICD-10-CM

## 2022-06-09 DIAGNOSIS — I25.10 CORONARY ARTERY DISEASE INVOLVING NATIVE CORONARY ARTERY OF NATIVE HEART WITHOUT ANGINA PECTORIS: ICD-10-CM

## 2022-06-09 DIAGNOSIS — I10 PRIMARY HYPERTENSION: ICD-10-CM

## 2022-06-09 DIAGNOSIS — E78.2 MIXED HYPERLIPIDEMIA: ICD-10-CM

## 2022-06-09 LAB
A/G RATIO: 1.3 (ref 1.1–2.2)
ALBUMIN SERPL-MCNC: 4.2 G/DL (ref 3.4–5)
ALP BLD-CCNC: 139 U/L (ref 40–129)
ALT SERPL-CCNC: 31 U/L (ref 10–40)
ANION GAP SERPL CALCULATED.3IONS-SCNC: 12 MMOL/L (ref 3–16)
AST SERPL-CCNC: 21 U/L (ref 15–37)
BILIRUB SERPL-MCNC: 0.7 MG/DL (ref 0–1)
BUN BLDV-MCNC: 12 MG/DL (ref 7–20)
CALCIUM SERPL-MCNC: 9.4 MG/DL (ref 8.3–10.6)
CHLORIDE BLD-SCNC: 105 MMOL/L (ref 99–110)
CO2: 23 MMOL/L (ref 21–32)
CREAT SERPL-MCNC: 1 MG/DL (ref 0.8–1.3)
GFR AFRICAN AMERICAN: >60
GFR NON-AFRICAN AMERICAN: >60
GLUCOSE BLD-MCNC: 94 MG/DL (ref 70–99)
HCT VFR BLD CALC: 40.1 % (ref 40.5–52.5)
HEMOGLOBIN: 13.5 G/DL (ref 13.5–17.5)
MCH RBC QN AUTO: 30.8 PG (ref 26–34)
MCHC RBC AUTO-ENTMCNC: 33.7 G/DL (ref 31–36)
MCV RBC AUTO: 91.4 FL (ref 80–100)
PDW BLD-RTO: 13.7 % (ref 12.4–15.4)
PLATELET # BLD: 232 K/UL (ref 135–450)
PMV BLD AUTO: 8.8 FL (ref 5–10.5)
POTASSIUM SERPL-SCNC: 5.1 MMOL/L (ref 3.5–5.1)
PRO-BNP: 79 PG/ML (ref 0–124)
RBC # BLD: 4.39 M/UL (ref 4.2–5.9)
SEDIMENTATION RATE, ERYTHROCYTE: 44 MM/HR (ref 0–20)
SODIUM BLD-SCNC: 140 MMOL/L (ref 136–145)
TOTAL PROTEIN: 7.4 G/DL (ref 6.4–8.2)
WBC # BLD: 6.6 K/UL (ref 4–11)

## 2022-06-09 PROCEDURE — 85027 COMPLETE CBC AUTOMATED: CPT

## 2022-06-09 PROCEDURE — 71046 X-RAY EXAM CHEST 2 VIEWS: CPT

## 2022-06-09 PROCEDURE — 99214 OFFICE O/P EST MOD 30 MIN: CPT | Performed by: NURSE PRACTITIONER

## 2022-06-09 PROCEDURE — 85652 RBC SED RATE AUTOMATED: CPT

## 2022-06-09 PROCEDURE — 83880 ASSAY OF NATRIURETIC PEPTIDE: CPT

## 2022-06-09 PROCEDURE — 36415 COLL VENOUS BLD VENIPUNCTURE: CPT

## 2022-06-09 PROCEDURE — 80053 COMPREHEN METABOLIC PANEL: CPT

## 2022-06-09 PROCEDURE — 1123F ACP DISCUSS/DSCN MKR DOCD: CPT | Performed by: NURSE PRACTITIONER

## 2022-06-09 NOTE — PROGRESS NOTES
[facility]     Outpatient Follow Up Note    Jeannie Buitrago is 77 y.o. male who presents today with a history of CAD s/p CABG with TORREY ligation Jan '21 with post-op pleural effusion s/p thoracentesis;  HTN and hyperlipidemia      CHIEF COMPLAINT / HPI:  Follow Up secondary to calling with c/o chest discomfort. His NM was normal. Unfortunately, he did not do his labs    Subjective: When he breaths, inhales, his chest gets tight. He feels nothing when he breaths shallow. When he inhales and holds it, he feels it    He denies (d) significant chest pain. His angina equivalent was feeling an ache in his left chest when exposed to cold air. He denies recurrence. He feels a stick in his left chest wall and thinks he feels a staple. The patient denies orthopnea/PND. He has a little swelling in his legs and some numbness. The patients weight is stable . The patient is not experiencing palpitations / light headedness   His home BP runs ~ 116/83    These symptoms show no change since the last OV with similar symptoms. With regard to medication therapy the patient has been compliant with prescribed regimen. They have tolerated therapy to date.      Past Medical History:   Diagnosis Date    Coronary artery disease involving native heart     Essential hypertension 5/28/2021    Hyperlipidemia 12/21/2020     Social History:    Social History     Tobacco Use   Smoking Status Never Smoker   Smokeless Tobacco Never Used     Current Medications:  Current Outpatient Medications   Medication Sig Dispense Refill    aspirin 325 MG EC tablet TAKE 1 TABLET BY MOUTH EVERY DAY 90 tablet 3    amLODIPine (NORVASC) 2.5 MG tablet TAKE 1 TABLET BY MOUTH EVERY DAY 90 tablet 3    Nutritional Supplements (ENSURE PO) Take 1 Can by mouth daily      metoprolol tartrate (LOPRESSOR) 50 MG tablet Take 1 tablet by mouth 2 times daily 180 tablet 3    atorvastatin (LIPITOR) 40 MG tablet Take 1 tablet by mouth nightly 90 tablet 3    acetaminophen (TYLENOL) 500 MG tablet Take 2 tablets by mouth every 6 hours (Patient not taking: Reported on 5/11/2022) 120 tablet 3    latanoprost (XALATAN) 0.005 % ophthalmic solution Place 1 drop into both eyes nightly        No current facility-administered medications for this visit. REVIEW OF SYSTEMS:    CONSTITUTIONAL: No major weight gain or loss, fatigue, weakness, night sweats or fever. HEENT: No new vision difficulties or ringing in the ears. RESPIRATORY: No new SOB, PND, orthopnea or cough. CARDIOVASCULAR: See HPI  GI: No nausea, vomiting, diarrhea, constipation, abdominal pain or changes in bowel habits. : No urinary frequency, urgency, incontinence hematuria or dysuria. SKIN: No cyanosis or skin lesions. MUSCULOSKELETAL: No new muscle or joint pain. NEUROLOGICAL: No syncope or TIA-like symptoms. PSYCHIATRIC: No anxiety, pain, insomnia or depression    Objective:   PHYSICAL EXAM:       Vitals:    06/09/22 1006 06/09/22 1019   BP: 90/60 104/64   Site: Right Upper Arm    Position: Sitting    Cuff Size: Medium Adult    Pulse: 63    SpO2: 98%    Weight: 190 lb (86.2 kg)    Height: 5' 7\" (1.702 m)         VITALS:  BP 90/60 (Site: Right Upper Arm, Position: Sitting, Cuff Size: Medium Adult)   Pulse 63   Ht 5' 7\" (1.702 m)   Wt 190 lb (86.2 kg)   SpO2 98%   BMI 29.76 kg/m²   CONSTITUTIONAL: Cooperative, no apparent distress, and appears well nourished / developed  NEUROLOGIC:  Awake and orientated to person, place and time. PSYCH: Calm affect. SKIN: Warm and dry. HEENT: Sclera non-icteric, normocephalic, neck supple, no elevation of JVP, normal carotid pulses with no bruits and thyroid normal size. LUNGS:  No increased work of breathing and clear to auscultation, no crackles or wheezing  CARDIOVASCULAR:  Regular rate 68 and rhythm with no murmurs, gallops, rubs, or abnormal heart sounds, normal PMI. The apical impulses not displaced; tenderness in chest wall with palpation   JVP less than 8 cm H2O  Heart tones are crisp and normal  Cervical veins are not engorged  The carotid upstroke is normal in amplitude and contour without delay or bruit  JVP is not elevated  ABDOMEN:  Normal bowel sounds, non-distended and non-tender to palpation  EXT: No edema, no calf tenderness. Pulses are present bilaterally. DATA:    Lab Results   Component Value Date    ALT 35 02/23/2022    AST 23 02/23/2022    ALKPHOS 137 (H) 02/23/2022    BILITOT 0.6 02/23/2022     Lab Results   Component Value Date    CREATININE 1.0 02/23/2022    BUN 13 02/23/2022     02/23/2022    K 4.0 02/23/2022     02/23/2022    CO2 25 02/23/2022       No components found for: CHLPL  Lab Results   Component Value Date    TRIG 104 02/23/2022    TRIG 77 01/07/2021    TRIG 87 12/14/2020     Lab Results   Component Value Date    HDL 41 02/23/2022    HDL 39 (L) 10/06/2021    HDL 38 (L) 01/07/2021     Lab Results   Component Value Date    LDLCALC 41 02/23/2022    LDLCALC 42 10/06/2021    LDLCALC 113 (H) 01/07/2021     Lab Results   Component Value Date    LABVLDL 21 02/23/2022    LABVLDL 25 10/06/2021    LABVLDL 15 01/07/2021     Radiology Review:  Pertinent images / reports were reviewed as a part of this visit and reveals the following:    ECHO 1/5/21  Summary   -Normal left ventricle size, wall thickness and systolic function with an   estimated ejection fraction of 55%.    -[place] is not well visualized.   -E/e\"= 11.6 .   -Normal diastolic function.   - Mild mitral regurgitation.   -Mild tricuspid regurgitation.   -Pasp 31mmHg.     1/7/21  Left Heart Cath  Dominance: Co      LM: short, MLI  LAD: 80% short, ostial stenosis; three moderate sized diagonals with 90% ostial to proximal  D2 and 70% ostial D3; 40% mid LAD disease  LCx: large first marginal free of significant disease; 85% eccentric mid stenosis followed by 40% proximal OM2   RCA: moderate sized vessel with 95% distal stenosis just prior to two posterolateral branches and RPDA; 90% mid acute marginal      LVEDP: 8 mmHg   LVEF: 65%     Limited Echo 1/22/21  Summary   -Normal left ventricle size, wall thickness, and systolic function with an   estimated ejection fraction of 55%.  -No regional wall motion abnormalities are seen.   -There is mild-to-moderate tricuspid regurgitation with a RVSP estimation of   33 mmHg.   -There is mild circumferential pericardial effusion noted    MCOT 02/02 - 3/03/2021- showed no atrial fibrillation, PVC< 1%, PAC <1%, high , Low HR 50, Average HR 77. Diary symptoms with Sinus Rhythm    Myoview: 5/23/22  Summary    Normal myocardial perfusion study.    Normal LV size and systolic function.           Assessment:      Diagnosis Orders   1. Chest tightness   ~persistent / atypical experienced with inspiration   ~did not have labs done / inflammatory markers  ~neg NM for ischemia        2. Coronary artery disease involving native coronary artery of native heart without angina pectoris   ~stable : denies angina  ~walking some without symptoms  ~s/p CABG Jan '21  ~normal LVEF  ~ASA / statin / BB    3. Primary  hypertension   ~controlled on current regimen  ~amlodipine / metoprolol     4. Mixed hyperlipidemia   ~controlled on atorvastatin           I had the opportunity to review the clinical symptoms and presentation of [facility]. Plan:     1. CBC/ESR/CMP/BNP as recommended   CXR with hx pleural effusion    OTC aleve 220 mg bid x5 days routinely then prn  2. F/U in 6 weeks    Overall the patient is stable from CV standpoint    I have addresed the patient's cardiac risk factors and adjusted pharmacologic treatment as needed. In addition, I have reinforced the need for patient directed risk factor modification. Further evaluation will be based upon the patient's clinical course and testing results. All questions and concerns were addressed to the patient Alternatives to my treatment were discussed. The patient is not currently smoking.  The risks related to smoking were reviewed with the patient. Recommend maintaining a smoke-free lifestyle. Patient is on a beta-blocker  Patient is not on an ace-i/ARB : neg CHF  Patient is on a statin     Antiplatelet therapy has been recommended / prescribed for this patient. Education conducted on adverse reactions including bleeding was discussed. The patient verbalizes understanding not to stop medications without discussing with us. Discussed exercise: 30-60 minutes 7 days/week : walks a lot  Discussed Low saturated fat diet. Thank you for allowing to us to participate in the care of Donal Lebron.     ALEXANDER Mosley    Documentation of today's visit sent to PCP

## 2022-06-10 ENCOUNTER — TELEPHONE (OUTPATIENT)
Dept: CARDIOLOGY CLINIC | Age: 67
End: 2022-06-10

## 2022-06-10 NOTE — TELEPHONE ENCOUNTER
Spoke to patient he will take the aleve as directed and follow up with his PCP about the inflammation

## 2022-06-10 NOTE — TELEPHONE ENCOUNTER
Called patient gave results. Gave instructions OTC aleve 220 mg bid x5 days routinely then prn     Patient wants to know why ESR is elevated. I explained to him that its inflammation that cause cp and chest tightness.     Patient wants you to give him a reason why this is elevated,    Raynald Cushing, APRN - CNP   6/10/2022  3:11 PM EDT         Esr up ; Gerard Kelly as recommended at [address] yesterday

## 2022-06-21 RX ORDER — METOPROLOL TARTRATE 50 MG/1
50 TABLET, FILM COATED ORAL 2 TIMES DAILY
Qty: 180 TABLET | Refills: 3 | Status: SHIPPED | OUTPATIENT
Start: 2022-06-21

## 2022-06-27 RX ORDER — PSEUDOEPHED/ACETAMINOPH/DIPHEN 30MG-500MG
TABLET ORAL
Qty: 120 TABLET | Refills: 3 | OUTPATIENT
Start: 2022-06-27

## 2022-06-27 NOTE — TELEPHONE ENCOUNTER
Received refill request for Atorvastatin 40mg from [facility] : 6/9/22 NPTS    Last Labs : 6/9/22 CMP, 2/23/22 LIPID    Last Refill : 4/14/21 90 W/3 REFILLS    Next OV : NO SCHEDULED OV,

## 2022-06-28 DIAGNOSIS — Z95.1 S/P CABG X 5: ICD-10-CM

## 2022-06-28 DIAGNOSIS — Z98.890 S/P THORACENTESIS: ICD-10-CM

## 2022-06-28 DIAGNOSIS — Z95.1 S/P CABG (CORONARY ARTERY BYPASS GRAFT): ICD-10-CM

## 2022-06-28 DIAGNOSIS — J90 PLEURAL EFFUSION: ICD-10-CM

## 2022-06-28 DIAGNOSIS — I48.19 ATRIAL FIBRILLATION, PERSISTENT (HCC): ICD-10-CM

## 2022-06-28 DIAGNOSIS — I31.39 PERICARDIAL EFFUSION: ICD-10-CM

## 2022-06-28 RX ORDER — AMLODIPINE BESYLATE 2.5 MG/1
TABLET ORAL
Qty: 90 TABLET | Refills: 3 | Status: SHIPPED | OUTPATIENT
Start: 2022-06-28

## 2022-06-28 RX ORDER — ATORVASTATIN CALCIUM 40 MG/1
TABLET, FILM COATED ORAL
Qty: 90 TABLET | Refills: 3 | Status: SHIPPED | OUTPATIENT
Start: 2022-06-28

## 2022-06-28 NOTE — TELEPHONE ENCOUNTER
Medication Refill    Medication needing refilled:  amLODIPine (NORVASC) 2.5 MG      Dosage of the medication:    How are you taking this medication (QD, BID, TID, QID, PRN): 1 TABLET BY MOUTH EVERY DAY    30 or 90 day supply called in:  90 day supply  When will you run out of your medication:    Which Pharmacy are we sending the medication to?:[facility]/pharmacy #[id number]- [address] [place] [address] [address]   [address] [facility], [facility] [address]   Phone:  [phone]  Fax:  [phone]    Pt states he has been having chest spasms over the last 2-3 days. Please call to advise.

## 2022-06-28 NOTE — TELEPHONE ENCOUNTER
Called pt about the message below and he stated that he has been having on and off chest spasms for the past 2-3 days.      Last OV: 06.09.22 NPTS  Last Labs: 06.09.22  Next appt: none at this time

## 2022-08-11 ENCOUNTER — TELEPHONE (OUTPATIENT)
Dept: CARDIOLOGY CLINIC | Age: 67
End: 2022-08-11

## 2022-08-11 NOTE — TELEPHONE ENCOUNTER
Forwarding message to you because you have actually been following him recently. BNN has not seen since September. Gretchen reviewed his testes with him. Your last ov stated for him to f/u in 6 weeks which was not scheduled. I did speak with Bernardino Carlos and offered him a f/u with you. It is scheduled 8/16/22. He was fine with that.  Thanks

## 2022-08-11 NOTE — TELEPHONE ENCOUNTER
I called Mr Juan Carrillo, he asked for results of stress test from May and lab results in June. I reviewed these with him. He states he feels like he has fluid in his chest, says his chest feels \"puffy\". He wants to know what can be causing this and what he can do for it.

## 2022-08-16 ENCOUNTER — HOSPITAL ENCOUNTER (OUTPATIENT)
Age: 67
Discharge: HOME | End: 2022-08-16
Payer: MEDICAID

## 2022-08-16 ENCOUNTER — OFFICE VISIT (OUTPATIENT)
Dept: CARDIOLOGY CLINIC | Age: 67
End: 2022-08-16
Payer: MEDICAID

## 2022-08-16 VITALS
BODY MASS INDEX: 29.76 KG/M2 | OXYGEN SATURATION: 96 % | WEIGHT: 189.6 LBS | SYSTOLIC BLOOD PRESSURE: 108 MMHG | DIASTOLIC BLOOD PRESSURE: 62 MMHG | HEIGHT: 67 IN | HEART RATE: 75 BPM

## 2022-08-16 DIAGNOSIS — R07.9 CHEST PAIN, UNSPECIFIED TYPE: ICD-10-CM

## 2022-08-16 DIAGNOSIS — R07.9 CHEST PAIN, UNSPECIFIED TYPE: Primary | ICD-10-CM

## 2022-08-16 DIAGNOSIS — E78.2 MIXED HYPERLIPIDEMIA: ICD-10-CM

## 2022-08-16 DIAGNOSIS — I25.810 CORONARY ARTERY DISEASE INVOLVING CORONARY BYPASS GRAFT OF NATIVE HEART WITHOUT ANGINA PECTORIS: ICD-10-CM

## 2022-08-16 DIAGNOSIS — I10 PRIMARY HYPERTENSION: ICD-10-CM

## 2022-08-16 LAB — SEDIMENTATION RATE, ERYTHROCYTE: 35 MM/HR (ref 0–20)

## 2022-08-16 PROCEDURE — 85652 RBC SED RATE AUTOMATED: CPT

## 2022-08-16 PROCEDURE — 1123F ACP DISCUSS/DSCN MKR DOCD: CPT | Performed by: NURSE PRACTITIONER

## 2022-08-16 PROCEDURE — 36415 COLL VENOUS BLD VENIPUNCTURE: CPT

## 2022-08-16 PROCEDURE — 99214 OFFICE O/P EST MOD 30 MIN: CPT | Performed by: NURSE PRACTITIONER

## 2022-08-16 NOTE — PROGRESS NOTES
[address]     Outpatient Follow Up Note    Timothy Kidd is 79 y.o. male who presents today with a history of CAD s/p CABG with TORREY ligation Jan '21 with post-op pleural effusion s/p thoracentesis;  HTN and hyperlipidemia      CHIEF COMPLAINT / HPI:  Follow Up secondary to CAD / CP . His ESR was elevated at 44 and inst to take OTC aleve bid for five days    Subjective:   He thinks he knows what his problem is with having chest discomfort : he's getting boobs. When he holds his chest upward, his breathing gets better. He denies (d) significant chest pain. His angina equivalent was feeling an ache in his left chest when exposed to cold air. He denies recurrence. The patient denies orthopnea/PND. He has a little swelling in his legs described as no more than normal. The patients weight is stable . The patient is not experiencing palpitations / light headedness (unless he saadia over to tie his shoes)  His home BP runs ~ 110-120/75    These symptoms show no change since the last OV with similar symptoms. With regard to medication therapy the patient has been compliant with prescribed regimen. They have tolerated therapy to date.      Past Medical History:   Diagnosis Date    Coronary artery disease involving native heart     Essential hypertension 5/28/2021    Hyperlipidemia 12/21/2020     Social History:    Social History     Tobacco Use   Smoking Status Never   Smokeless Tobacco Never     Current Medications:  Current Outpatient Medications   Medication Sig Dispense Refill    atorvastatin (LIPITOR) 40 MG tablet TAKE 1 TABLET BY MOUTH EVERY DAY AT NIGHT 90 tablet 3    amLODIPine (NORVASC) 2.5 MG tablet TAKE 1 TABLET BY MOUTH EVERY DAY 90 tablet 3    metoprolol tartrate (LOPRESSOR) 50 MG tablet TAKE 1 TABLET BY MOUTH 2 TIMES DAILY 180 tablet 3    Nutritional Supplements (ENSURE PO) Take 1 Can by mouth daily      acetaminophen (TYLENOL) 500 MG tablet Take 2 tablets by mouth every 6 hours 120 tablet 3 latanoprost (XALATAN) 0.005 % ophthalmic solution Place 1 drop into both eyes nightly       aspirin 325 MG EC tablet TAKE 1 TABLET BY MOUTH EVERY DAY (Patient not taking: Reported on 8/16/2022) 90 tablet 3     No current facility-administered medications for this visit. REVIEW OF SYSTEMS:    CONSTITUTIONAL: No major weight gain or loss, fatigue, weakness, night sweats or fever. HEENT: No new vision difficulties or ringing in the ears. RESPIRATORY: No new SOB, PND, orthopnea or cough. CARDIOVASCULAR: See HPI  GI: No nausea, vomiting, diarrhea, constipation, abdominal pain or changes in bowel habits. : No urinary frequency, urgency, incontinence hematuria or dysuria. SKIN: No cyanosis or skin lesions. MUSCULOSKELETAL: No new muscle or joint pain. NEUROLOGICAL: No syncope or TIA-like symptoms. PSYCHIATRIC: No anxiety, pain, insomnia or depression    Objective:   PHYSICAL EXAM:       Vitals:    08/16/22 1430 08/16/22 1453   BP: (!) 90/50 108/62   Site: Left Upper Arm    Position: Sitting    Cuff Size: Medium Adult    Pulse: 75    SpO2: 96%    Weight: 189 lb 9.6 oz (86 kg)    Height: 5' 7\" (1.702 m)         VITALS:  BP (!) 90/50 (Site: Left Upper Arm, Position: Sitting, Cuff Size: Medium Adult)   Pulse 75   Ht 5' 7\" (1.702 m)   Wt 189 lb 9.6 oz (86 kg)   SpO2 96%   BMI 29.70 kg/m²   CONSTITUTIONAL: Cooperative, no apparent distress, and appears well nourished / developed  NEUROLOGIC:  Awake and orientated to person, place and time. PSYCH: Calm affect. SKIN: Warm and dry. HEENT: Sclera non-icteric, normocephalic, neck supple, no elevation of JVP, normal carotid pulses with no bruits and thyroid normal size. LUNGS:  No increased work of breathing and clear to auscultation, no crackles or wheezing  CARDIOVASCULAR:  Regular rate 68 and rhythm with no murmurs, gallops, rubs, or abnormal heart sounds, normal PMI. The apical impulses not displaced; tenderness in chest wall with palpation   JVP less than 8 cm H2O  Heart tones are crisp and normal  Cervical veins are not engorged  The carotid upstroke is normal in amplitude and contour without delay or bruit  JVP is not elevated  ABDOMEN:  Normal bowel sounds, non-distended and non-tender to palpation  EXT: No edema, no calf tenderness. Pulses are present bilaterally. DATA:    Lab Results   Component Value Date    ALT 31 06/09/2022    AST 21 06/09/2022    ALKPHOS 139 (H) 06/09/2022    BILITOT 0.7 06/09/2022     Lab Results   Component Value Date    CREATININE 1.0 06/09/2022    BUN 12 06/09/2022     06/09/2022    K 5.1 06/09/2022     06/09/2022    CO2 23 06/09/2022       No components found for: CHLPL  Lab Results   Component Value Date    TRIG 104 02/23/2022    TRIG 77 01/07/2021    TRIG 87 12/14/2020     Lab Results   Component Value Date    HDL 41 02/23/2022    HDL 39 (L) 10/06/2021    HDL 38 (L) 01/07/2021     Lab Results   Component Value Date    LDLCALC 41 02/23/2022    LDLCALC 42 10/06/2021    LDLCALC 113 (H) 01/07/2021     Lab Results   Component Value Date    LABVLDL 21 02/23/2022    LABVLDL 25 10/06/2021    LABVLDL 15 01/07/2021       Component Ref Range & Units 6/9/22 1057 10/6/21 1102 1/15/19 1330   Sed Rate 0 - 20 mm/Hr 44 High   18  28 High      Radiology Review:  Pertinent images / reports were reviewed as a part of this visit and reveals the following:    ECHO 1/5/21  Summary   -Normal left ventricle size, wall thickness and systolic function with an   estimated ejection fraction of 55%. -[place] is not well visualized. -E/e\"= 11.6 .   -Normal diastolic function.   - Mild mitral regurgitation.   -Mild tricuspid regurgitation.   -Pasp 31mmHg.      1/7/21  Left Heart Cath  Dominance: Co      LM: short, MLI  LAD: 80% short, ostial stenosis; three moderate sized diagonals with 90% ostial to proximal  D2 and 70% ostial D3; 40% mid LAD disease  LCx: large first marginal free of significant disease; 85% eccentric mid stenosis followed by 40% proximal OM2   RCA: moderate sized vessel with 95% distal stenosis just prior to two posterolateral branches and RPDA; 90% mid acute marginal      LVEDP: 8 mmHg   LVEF: 65%     Limited Echo 1/22/21  Summary   -Normal left ventricle size, wall thickness, and systolic function with an   estimated ejection fraction of 55%.   -No regional wall motion abnormalities are seen. -There is mild-to-moderate tricuspid regurgitation with a RVSP estimation of   33 mmHg. -There is mild circumferential pericardial effusion noted    OT 02/02 - 3/03/2021- showed no atrial fibrillation, PVC< 1%, PAC <1%, high , Low HR 50, Average HR 77. Diary symptoms with Sinus Rhythm    Myoview: 5/23/22  Summary    Normal myocardial perfusion study. Normal LV size and systolic function. Assessment:      Diagnosis Orders   1. Chest tightness   ~continues to have atypical discomfort  ~ESR elevated at 44 tx with OTC NSAID        2. Coronary artery disease involving native coronary artery of native heart without angina pectoris   ~stable : denies angina  ~s/p CABG Jan '21  ~normal LVEF  ~ASA / statin / BB    3. Primary  hypertension   ~controlled   ~amlodipine / metoprolol     4. Mixed hyperlipidemia   ~LDL well controlled on atorvastatin           I had the opportunity to review the clinical symptoms and presentation of Davide Rainey. Plan:     1. ESR to assess persistent discomfort and trend  2. Resume ASA  3. F/U in 4-6 months     Overall the patient is stable from CV standpoint    I have addresed the patient's cardiac risk factors and adjusted pharmacologic treatment as needed. In addition, I have reinforced the need for patient directed risk factor modification. Further evaluation will be based upon the patient's clinical course and testing results. All questions and concerns were addressed to the patient Alternatives to my treatment were discussed. The patient is not currently smoking.  The risks related to smoking were reviewed with the patient. Recommend maintaining a smoke-free lifestyle. Patient is on a beta-blocker  Patient is not on an ace-i/ARB : neg CHF  Patient is on a statin     Antiplatelet therapy has been recommended / prescribed for this patient. Education conducted on adverse reactions including bleeding was discussed. The patient verbalizes understanding not to stop medications without discussing with us. Discussed exercise: 30-60 minutes 7 days/week : walks a lot  Discussed Low saturated fat diet. Thank you for allowing to us to participate in the care of Dale Addison.     ALEXANDER Prater    Documentation of today's visit sent to PCP

## 2022-08-18 ENCOUNTER — TELEPHONE (OUTPATIENT)
Dept: INTERNAL MEDICINE CLINIC | Age: 67
End: 2022-08-18

## 2022-08-26 ENCOUNTER — OFFICE VISIT (OUTPATIENT)
Dept: INTERNAL MEDICINE CLINIC | Age: 67
End: 2022-08-26
Payer: MEDICAID

## 2022-08-26 VITALS
HEIGHT: 67 IN | WEIGHT: 189 LBS | SYSTOLIC BLOOD PRESSURE: 110 MMHG | DIASTOLIC BLOOD PRESSURE: 62 MMHG | HEART RATE: 68 BPM | BODY MASS INDEX: 29.66 KG/M2 | OXYGEN SATURATION: 98 %

## 2022-08-26 DIAGNOSIS — N62 GYNECOMASTIA: ICD-10-CM

## 2022-08-26 DIAGNOSIS — R70.0 ELEVATED ERYTHROCYTE SEDIMENTATION RATE: ICD-10-CM

## 2022-08-26 DIAGNOSIS — N62 GYNECOMASTIA: Primary | ICD-10-CM

## 2022-08-26 LAB
BASOPHILS ABSOLUTE: 0 K/UL (ref 0–0.2)
BASOPHILS RELATIVE PERCENT: 0.5 %
C-REACTIVE PROTEIN: <3 MG/L (ref 0–5.1)
EOSINOPHILS ABSOLUTE: 0.1 K/UL (ref 0–0.6)
EOSINOPHILS RELATIVE PERCENT: 1.7 %
HCT VFR BLD CALC: 38.4 % (ref 40.5–52.5)
HEMOGLOBIN: 13.1 G/DL (ref 13.5–17.5)
LUTEINIZING HORMONE: 8.3 MIU/ML
LYMPHOCYTES ABSOLUTE: 1.9 K/UL (ref 1–5.1)
LYMPHOCYTES RELATIVE PERCENT: 33.3 %
MCH RBC QN AUTO: 31.3 PG (ref 26–34)
MCHC RBC AUTO-ENTMCNC: 34 G/DL (ref 31–36)
MCV RBC AUTO: 92 FL (ref 80–100)
MONOCYTES ABSOLUTE: 0.4 K/UL (ref 0–1.3)
MONOCYTES RELATIVE PERCENT: 7.3 %
NEUTROPHILS ABSOLUTE: 3.2 K/UL (ref 1.7–7.7)
NEUTROPHILS RELATIVE PERCENT: 57.2 %
PDW BLD-RTO: 14.2 % (ref 12.4–15.4)
PLATELET # BLD: 248 K/UL (ref 135–450)
PMV BLD AUTO: 8.6 FL (ref 5–10.5)
RBC # BLD: 4.18 M/UL (ref 4.2–5.9)
RHEUMATOID FACTOR: <10 IU/ML
SEDIMENTATION RATE, ERYTHROCYTE: 37 MM/HR (ref 0–20)
TSH SERPL DL<=0.05 MIU/L-ACNC: 0.96 UIU/ML (ref 0.27–4.2)
WBC # BLD: 5.6 K/UL (ref 4–11)

## 2022-08-26 PROCEDURE — 99213 OFFICE O/P EST LOW 20 MIN: CPT | Performed by: INTERNAL MEDICINE

## 2022-08-26 PROCEDURE — 1123F ACP DISCUSS/DSCN MKR DOCD: CPT | Performed by: INTERNAL MEDICINE

## 2022-08-26 SDOH — ECONOMIC STABILITY: FOOD INSECURITY: WITHIN THE PAST 12 MONTHS, YOU WORRIED THAT YOUR FOOD WOULD RUN OUT BEFORE YOU GOT MONEY TO BUY MORE.: NEVER TRUE

## 2022-08-26 SDOH — ECONOMIC STABILITY: FOOD INSECURITY: WITHIN THE PAST 12 MONTHS, THE FOOD YOU BOUGHT JUST DIDN'T LAST AND YOU DIDN'T HAVE MONEY TO GET MORE.: NEVER TRUE

## 2022-08-26 ASSESSMENT — PATIENT HEALTH QUESTIONNAIRE - PHQ9
2. FEELING DOWN, DEPRESSED OR HOPELESS: 0
SUM OF ALL RESPONSES TO PHQ QUESTIONS 1-9: 0
SUM OF ALL RESPONSES TO PHQ QUESTIONS 1-9: 0
7. TROUBLE CONCENTRATING ON THINGS, SUCH AS READING THE NEWSPAPER OR WATCHING TELEVISION: 0
1. LITTLE INTEREST OR PLEASURE IN DOING THINGS: 0
SUM OF ALL RESPONSES TO PHQ QUESTIONS 1-9: 0
9. THOUGHTS THAT YOU WOULD BE BETTER OFF DEAD, OR OF HURTING YOURSELF: 0
3. TROUBLE FALLING OR STAYING ASLEEP: 0
5. POOR APPETITE OR OVEREATING: 0
8. MOVING OR SPEAKING SO SLOWLY THAT OTHER PEOPLE COULD HAVE NOTICED. OR THE OPPOSITE, BEING SO FIGETY OR RESTLESS THAT YOU HAVE BEEN MOVING AROUND A LOT MORE THAN USUAL: 0
10. IF YOU CHECKED OFF ANY PROBLEMS, HOW DIFFICULT HAVE THESE PROBLEMS MADE IT FOR YOU TO DO YOUR WORK, TAKE CARE OF THINGS AT HOME, OR GET ALONG WITH OTHER PEOPLE: 0
4. FEELING TIRED OR HAVING LITTLE ENERGY: 0
DEPRESSION UNABLE TO ASSESS: FUNCTIONAL CAPACITY MOTIVATION LIMITS ACCURACY
SUM OF ALL RESPONSES TO PHQ9 QUESTIONS 1 & 2: 0
6. FEELING BAD ABOUT YOURSELF - OR THAT YOU ARE A FAILURE OR HAVE LET YOURSELF OR YOUR FAMILY DOWN: 0
SUM OF ALL RESPONSES TO PHQ QUESTIONS 1-9: 0

## 2022-08-26 ASSESSMENT — ANXIETY QUESTIONNAIRES
2. NOT BEING ABLE TO STOP OR CONTROL WORRYING: 0
GAD7 TOTAL SCORE: 0
7. FEELING AFRAID AS IF SOMETHING AWFUL MIGHT HAPPEN: 0
5. BEING SO RESTLESS THAT IT IS HARD TO SIT STILL: 0
1. FEELING NERVOUS, ANXIOUS, OR ON EDGE: 0
3. WORRYING TOO MUCH ABOUT DIFFERENT THINGS: 0
6. BECOMING EASILY ANNOYED OR IRRITABLE: 0
IF YOU CHECKED OFF ANY PROBLEMS ON THIS QUESTIONNAIRE, HOW DIFFICULT HAVE THESE PROBLEMS MADE IT FOR YOU TO DO YOUR WORK, TAKE CARE OF THINGS AT HOME, OR GET ALONG WITH OTHER PEOPLE: NOT DIFFICULT AT ALL
4. TROUBLE RELAXING: 0

## 2022-08-26 ASSESSMENT — SOCIAL DETERMINANTS OF HEALTH (SDOH): HOW HARD IS IT FOR YOU TO PAY FOR THE VERY BASICS LIKE FOOD, HOUSING, MEDICAL CARE, AND HEATING?: NOT HARD AT ALL

## 2022-08-26 NOTE — PROGRESS NOTES
Maranda Medina (:  1955) is a 79 y.o. male,Established patient, here for evaluation of the following chief complaint(s):  Results (Discuss abnormal results )         ASSESSMENT/PLAN:  1. Gynecomastia  -     Testosterone; Future  -     Luteinizing Hormone; Future  -     TSH; Future  -     ESTROGENS, FRACTIONATED; Future  2. Elevated erythrocyte sedimentation rate  -     Sedimentation Rate; Future  -     C-Reactive Protein; Future  -     CBC with Auto Differential; Future  -     DORIAN Reflex to Antibody Cascade; Future  -     RHEUMATOID FACTOR; Future    No follow-ups on file. Subjective   SUBJECTIVE/OBJECTIVE:  HPI patient comes in for bilateral gynecomastia over the past 6 months. He states that  He has some discomfort, but no real pain. He is not having any nipple drainage. He also has an elevated esr that is  Improving. Review of Systems       Objective   Vitals:    22 0906   BP: 110/62   Pulse: 68   SpO2: 98%   Weight: 189 lb (85.7 kg)   Height: 5' 7\" (1.702 m)      Wt Readings from Last 3 Encounters:   22 189 lb (85.7 kg)   22 189 lb 9.6 oz (86 kg)   22 190 lb (86.2 kg)     BP Readings from Last 3 Encounters:   22 110/62   22 108/62   22 104/64     Body mass index is 29.6 kg/m². Facility age limit for growth percentiles is 20 years. Physical Exam  Constitutional:       Appearance: Normal appearance. HENT:      Nose: Nose normal. No congestion or rhinorrhea. Chest:      Chest wall: No mass, lacerations, deformity or swelling. Breasts:     Breasts are symmetrical.      Right: No inverted nipple, mass or nipple discharge. Left: No inverted nipple, mass or nipple discharge. Neurological:      Mental Status: He is alert. Lab Results   Component Value Date    SEDRATE 28 (H) 2022          An electronic signature was used to authenticate this note.     --Jewels Cornejo MD

## 2022-08-27 LAB — ANTI-NUCLEAR ANTIBODY (ANA): NEGATIVE

## 2022-08-30 LAB — TESTOSTERONE TOTAL: 423 NG/DL (ref 220–1000)

## 2022-09-14 LAB
ESTRADIOL LEVEL: 22.4 PG/ML (ref 10–42)
ESTROGEN TOTAL: 58.2 PG/ML (ref 19–69)
ESTRONE: 35.8 PG/ML (ref 9–36)

## 2022-12-08 ENCOUNTER — OFFICE VISIT (OUTPATIENT)
Dept: INTERNAL MEDICINE CLINIC | Age: 67
End: 2022-12-08
Payer: MEDICAID

## 2022-12-08 VITALS
HEART RATE: 68 BPM | BODY MASS INDEX: 30.38 KG/M2 | SYSTOLIC BLOOD PRESSURE: 116 MMHG | WEIGHT: 194 LBS | DIASTOLIC BLOOD PRESSURE: 74 MMHG | OXYGEN SATURATION: 99 % | TEMPERATURE: 97.2 F

## 2022-12-08 DIAGNOSIS — I25.810 CORONARY ARTERY DISEASE INVOLVING CORONARY BYPASS GRAFT OF NATIVE HEART WITHOUT ANGINA PECTORIS: ICD-10-CM

## 2022-12-08 DIAGNOSIS — I10 ESSENTIAL HYPERTENSION: Primary | ICD-10-CM

## 2022-12-08 PROCEDURE — 99214 OFFICE O/P EST MOD 30 MIN: CPT | Performed by: INTERNAL MEDICINE

## 2022-12-08 PROCEDURE — 3074F SYST BP LT 130 MM HG: CPT | Performed by: INTERNAL MEDICINE

## 2022-12-08 PROCEDURE — 3078F DIAST BP <80 MM HG: CPT | Performed by: INTERNAL MEDICINE

## 2022-12-08 PROCEDURE — 1123F ACP DISCUSS/DSCN MKR DOCD: CPT | Performed by: INTERNAL MEDICINE

## 2022-12-08 RX ORDER — ACETAMINOPHEN 500 MG
1000 TABLET ORAL EVERY 6 HOURS
Qty: 120 TABLET | Refills: 3 | Status: SHIPPED | OUTPATIENT
Start: 2022-12-08

## 2022-12-08 ASSESSMENT — PATIENT HEALTH QUESTIONNAIRE - PHQ9
SUM OF ALL RESPONSES TO PHQ QUESTIONS 1-9: 0
1. LITTLE INTEREST OR PLEASURE IN DOING THINGS: 0
SUM OF ALL RESPONSES TO PHQ QUESTIONS 1-9: 0
SUM OF ALL RESPONSES TO PHQ9 QUESTIONS 1 & 2: 0
2. FEELING DOWN, DEPRESSED OR HOPELESS: 0

## 2022-12-08 NOTE — PROGRESS NOTES
Kim Barfield (:  1955) is a 79 y.o. male,Established patient, here for evaluation of the following chief complaint(s):  Atrial Fibrillation and Hip Pain (right)         ASSESSMENT/PLAN:  1. Essential hypertension  Stable  - continue amlodipine    2. Coronary artery disease involving coronary bypass graft of native heart without angina pectoris  Stable  -  continue metoprolol and atorvastatin    No follow-ups on file. Subjective   SUBJECTIVE/OBJECTIVE:  HPI  patient comes in for follow-up of atrial fibrillation following his open heart surgery. He has been in  Sinus rhythm and doing well. He is taking metoprolol, amlodipine, and lipitor and aspirin. He follows  With Brenton Krishnan. Following heart studies:    Myocardial perfusion (2022) - normal perfusion      Review of Systems       Objective   Vitals:    22 1611   BP: 116/74   Pulse: 68   Temp: 97.2 °F (36.2 °C)   TempSrc: Infrared   SpO2: 99%   Weight: 194 lb (88 kg)      Wt Readings from Last 3 Encounters:   22 194 lb (88 kg)   22 189 lb (85.7 kg)   22 189 lb 9.6 oz (86 kg)     BP Readings from Last 3 Encounters:   22 116/74   22 110/62   22 108/62     Body mass index is 30.38 kg/m². Facility age limit for growth percentiles is 20 years. Physical Exam  Constitutional:       Appearance: Normal appearance. HENT:      Right Ear: Tympanic membrane normal.      Left Ear: Tympanic membrane normal.      Nose: Nose normal. No congestion. Eyes:      General:         Right eye: No discharge. Left eye: No discharge. Pupils: Pupils are equal, round, and reactive to light. Cardiovascular:      Rate and Rhythm: Normal rate and regular rhythm. Pulmonary:      Effort: Pulmonary effort is normal. No respiratory distress. Breath sounds: No stridor. No wheezing or rhonchi. Musculoskeletal:      Cervical back: Normal range of motion. No rigidity or tenderness.    Neurological:      Mental Status: He is alert. An electronic signature was used to authenticate this note.     --Shelli Vegas MD

## 2023-01-03 ENCOUNTER — TELEPHONE (OUTPATIENT)
Dept: CARDIOLOGY CLINIC | Age: 68
End: 2023-01-03

## 2023-01-03 NOTE — TELEPHONE ENCOUNTER
I spoke with pt. He states that it is more like a twitching feeling. Denies CP, radiating pain or SOB. HR in the 70's.

## 2023-01-03 NOTE — TELEPHONE ENCOUNTER
Pt states he feels like he is having a muscle spasm in the left front chest wall area. Says his BP is around 123/?? And pulse is fine. Pt denies sob or dizziness. Asking if this is something to be concerned with. Please advise.

## 2023-01-04 NOTE — TELEPHONE ENCOUNTER
Spoke with Carlo Sullivan- offered to start Imdur  He would prefer to discuss with [facility] in person  Moved up his appointment

## 2023-01-04 NOTE — TELEPHONE ENCOUNTER
Returned call to [place]; describes chest as muscle spasms or \"tremors. \" Not painful. \"Can't say if anything helps or worsens. \" More noticeable at night when laying down. Not every day and intermittent over the last two weeks. Admits to occasional dizziness with position change. Compliant with medications.      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

## 2023-01-06 PROBLEM — R07.89 OTHER CHEST PAIN: Status: ACTIVE | Noted: 2023-01-06

## 2023-01-09 NOTE — PROGRESS NOTES
1/10/2023    PATIENT: Shashank Metz  : 1955    Primary Care Provider:   Alpa Patel MD  E:[phone]  f:[phone]     Reason for evaluation:   Chief Complaint   Patient presents with    Coronary Artery Disease     No cardiac symptoms at this time. History of present illness:   Mr. Shashank Metz is a 79 y.o. male patient here for an acute cardiovascular visit by his request, for concerns of 2-3 weeks of chest \"muscle spasms or tremors. \" Feels symptoms are more noticeable at nighttime when laying down. Describes them at times to be sharp and stabbing with certain movements. At other times he feels if he lifts his left chest tissue with his hand that he can \"breathe more freely\". Negative hormone workup when questioning gynecomastia with Dr. Markos Xiong. He asked several times if it could be his weight gain, approximately 12 pounds over the past year and a half. No orthopnea or edema. When asked about previous angina, he is uncertain how similar this may be \"as he did not know he needed bypass surgery\". At that time he had described it to be an aching chest pain into his left arm in 2020 without a clear exertional component. He again recalls it to primarily have been provoked by the cold at that time. He underwent stress testing in May 2022 for reported chest \"fullness\" with inspiration, which was without ischemia. ESR was found to be elevated and was treated with NSAIDs.     Medical History:      Diagnosis Date    Coronary artery disease involving native heart     Essential hypertension 2021    Hyperlipidemia 2020     Surgical History:      Procedure Laterality Date    COLONOSCOPY  2018    CORONARY ARTERY BYPASS GRAFT N/A 2021    URGENT CABG X5, CORONARY ARTERY BYPASS COOK TO LAD, VEIN GRAFT TO RPDA, SEQUENTAIL VEIN GRAFT TO D3, D2 AND PL BRANCH CIRCUMFLEX, LIGATION LEFT ATRIAL APPENDAGE 40MM ATRICLIP, EVH LEFT LEG, ENDOSCOPICALLY VEIN HARVEST OF LEFT SAPHENOUS VEIN, GRETA AORTIC ULTRASOUND, DOPPLER VERIFICATION OF FLOW TO CONFIRM BYPASS GRAFTS, INSERTION OF VENTRICULAR PACING WIRES, BILATERAL INCOSTAL NEVRE BLOCK 5TH LEVEL W     Social History:  Social History     Socioeconomic History    Marital status:      Spouse name: Marybeth Colunga    Number of children: 2    Years of education: 16    Highest education level: Not on file   Occupational History    Not on file   Tobacco Use    Smoking status: Never    Smokeless tobacco: Never   Vaping Use    Vaping Use: Never used   Substance and Sexual Activity    Alcohol use: No     Alcohol/week: 0.8 standard drinks     Types: 1 Standard drinks or equivalent per week    Drug use: No    Sexual activity: Not on file   Other Topics Concern    Not on file   Social History Narrative    Not on file     Social Determinants of Health     Financial Resource Strain: Low Risk     Difficulty of Paying Living Expenses: Not hard at all   Food Insecurity: No Food Insecurity    Worried About Running Out of Food in the Last Year: Never true    Ran Out of Food in the Last Year: Never true   Transportation Needs: Not on file   Physical Activity: Inactive    Days of Exercise per Week: 0 days    Minutes of Exercise per Session: 0 min   Stress: Not on file   Social Connections: Not on file   Intimate Partner Violence: Not on file   Housing Stability: Not on file        Family History:  No evidence for sudden cardiac death or premature CAD. Problem Relation Age of Onset    Heart Disease Paternal Uncle     High Blood Pressure Mother     Diabetes Mother     High Blood Pressure Father        Medications:  [x] Medications and dosages reviewed. Prior to Admission medications    Medication Sig Start Date End Date Taking? Authorizing Provider   acetaminophen (TYLENOL) 500 MG tablet Take 2 tablets by mouth in the morning and 2 tablets at noon and 2 tablets in the evening and 2 tablets before bedtime.  12/8/22  Yes Jose Perkins MD   atorvastatin (LIPITOR) 40 MG tablet TAKE 1 TABLET BY MOUTH EVERY DAY AT NIGHT 6/28/22  Yes ALEXANDER Conte CNP   amLODIPine (NORVASC) 2.5 MG tablet TAKE 1 TABLET BY MOUTH EVERY DAY 6/28/22  Yes ALEXANDER Conte CNP   metoprolol tartrate (LOPRESSOR) 50 MG tablet TAKE 1 TABLET BY MOUTH 2 TIMES DAILY 6/21/22  Yes Marizol Regan DO   aspirin 325 MG EC tablet TAKE 1 TABLET BY MOUTH EVERY DAY 5/11/22  Yes ALEXANDER Conte CNP   Nutritional Supplements (ENSURE PO) Take 1 Can by mouth daily   Yes Historical Provider, MD   latanoprost (XALATAN) 0.005 % ophthalmic solution Place 1 drop into both eyes nightly  1/6/20  Yes Historical Provider, MD       Allergies:  Patient has no known allergies. Review of Systems:    [x]Full ROS obtained and negative except as mentioned in HPI    Physical Examination:    /82 (Site: Left Upper Arm, Position: Sitting, Cuff Size: Large Adult)   Pulse 66   Ht 5' 7\" (1.702 m)   Wt 197 lb 8 oz (89.6 kg)   SpO2 99%   BMI 30.93 kg/m²   Wt Readings from Last 3 Encounters:   01/10/23 197 lb 8 oz (89.6 kg)   12/08/22 194 lb (88 kg)   08/26/22 189 lb (85.7 kg)     Vitals:    01/10/23 1357   BP: 128/82   Pulse: 66   SpO2: 99%     GENERAL: Well developed, well nourished, no acute distress  NEUROLOGICAL: Alert and oriented x3  PSYCH: Normal mood and affect   SKIN: Warm and dry  HEENT: Normocephalic, atraumatic, Sclera non-icteric, mucous membranes moist  NECK: supple, JVP normal  CARDIAC: Normal PMI, regular rate and rhythm, normal S1S2, no murmur, rub, or gallop  RESPIRATORY: Normal respiratory effort, clear to auscultation bilaterally  EXTREMITIES: no edema or clubbing, +2 pulses bilaterally   MUSCULOSKELETAL: No joint swelling or tenderness, no chest wall tenderness  GASTROINTESTINAL: normal bowel sounds, soft, non-tender    Labs:  Lab Review   No visits with results within 2 Month(s) from this visit.    Latest known visit with results is:   Orders Only on 08/26/2022   Component Date Value    Testosterone 08/26/2022 423     LH 08/26/2022 8.3     TSH 08/26/2022 0.96     Sed Rate 08/26/2022 37 (A)     CRP 08/26/2022 <3.0     WBC 08/26/2022 5.6     RBC 08/26/2022 4.18 (A)     Hemoglobin 08/26/2022 13.1 (A)     Hematocrit 08/26/2022 38.4 (A)     MCV 08/26/2022 92.0     MCH 08/26/2022 31.3     MCHC 08/26/2022 34.0     RDW 08/26/2022 14.2     Platelets 54/65/3037 248     MPV 08/26/2022 8.6     Neutrophils % 08/26/2022 57.2     Lymphocytes % 08/26/2022 33.3     Monocytes % 08/26/2022 7.3     Eosinophils % 08/26/2022 1.7     Basophils % 08/26/2022 0.5     Neutrophils Absolute 08/26/2022 3.2     Lymphocytes Absolute 08/26/2022 1.9     Monocytes Absolute 08/26/2022 0.4     Eosinophils Absolute 08/26/2022 0.1     Basophils Absolute 08/26/2022 0.0     DORIAN 08/26/2022 Negative     Rheumatoid Factor 08/26/2022 <10.0     Estradiol 08/26/2022 22.4     Estrone 08/26/2022 35.8     Estrogen Total 08/26/2022 58.2        Imaging:  I have reviewed the below testing personally:    Carotid US 1/7/21  No hemodynamically significant carotid stenosis noted on either side. Vertebral flow are antegrade bilaterally. MCOT 02/02/2021- 03/03/2021   Showed no atrial fibrillation,   PVC< 1%, PAC <1%, high , Low HR 50, Average HR 77. Diary symptoms with Sinus Rhythm    GXT 2/17/20   Rest   ECG   Normal sinus rhythm. Stress   Stress Type: Exercise      Stress Protocol: Antolin      Rest HR: 79 bpm                             HR BP Product: 71275   Rest BP: 127/88 mmHg                        Max Exercise: 7 METS   Stress Peak HR: 137 bpm   Stress Peak BP: 156/71 mmHg   Predicted HR: 156 bpm   % of predicted HR: 88   Test Duration: 6 min   Reason for Termination: Physiologic Maximum      Results      ECG   Normal sinus rhythm. Normal electrocardiographic response to exercise with less than 1.0 mm of up   sloping ST depression.       Symptoms   There was stress induced lightheadedness. Patient had \"muscular\" chest discomfort 7/10 before stress with no change   noted. Symptoms resolved with rest.    ECHO 1/5/21  Summary   -Normal left ventricle size, wall thickness and systolic function with an   estimated ejection fraction of 55%. -Menan is not well visualized. -E/e\"= 11.6 .   -Normal diastolic function.   - Mild mitral regurgitation.   -Mild tricuspid regurgitation.   -Pasp 31mmHg. 1/7/21  Left Heart Cath  Dominance: Co      LM: short, MLI  LAD: 80% short, ostial stenosis; three moderate sized diagonals with 90% ostial to proximal  D2 and 70% ostial D3; 40% mid LAD disease  LCx: large first marginal free of significant disease; 85% eccentric mid stenosis followed by 40% proximal OM2   RCA: moderate sized vessel with 95% distal stenosis just prior to two posterolateral branches and RPDA; 90% mid acute marginal      LVEDP: 8 mmHg   LVEF: 65%      Limited Echo 1/22/21  Summary   -Normal left ventricle size, wall thickness, and systolic function with an   estimated ejection fraction of 55%.   -No regional wall motion abnormalities are seen. -There is mild-to-moderate tricuspid regurgitation with a RVSP estimation of   33 mmHg. -There is mild circumferential pericardial effusion noted. SPECT 5/23/22  Summary    Normal myocardial perfusion study. Normal LV size and systolic function.        EKG 1/10/2023  NSR @ 66 bpm    Impression/Recommendations    Mr. Shyam Neumann is a 79 y.o. male patient with:    CAD: CABG x5 (LIMA-LAD, SVG-RPDA, SVG to D2- D3-LPLB) January 2021 Dr. Alaina Rubin, SPECT MPI 5/23/22 without ischemia  PAF: post op, asymptomatic, s/p TORREY ligation, under the care of Dr. Lupillo Vogt, without recurrence on MCOT  Postop pleural effusion with US Left sided Thoracentesis 1/14/21  Hypertension, controlled  Hyperlipidemia, controlled (2/2022:   HDL 41 LDL 41)  Borderline diabetes mellitus (A1C 6.3)       Magdi offers several chest descriptions above, different from anginal description prior to 2021 CABG. Musculoskeletal component still present. Discussed his concerns for weight gain contributing to fullness/breathing change. Volume compensated on exam. Agree with likelihood for newly obese status/deconditioning playing larger role but will update echocardiogram from two years ago. Myocardial perfusion in 2022 was normal. ECG in office today is normal.   Continue aspirin, statin, beta blocker, calcium channel blocker. Return in about 6 months (around 7/10/2023). Patient Instructions   Get labwork    Echocardiogram    Call with questions/concerns or if any cardiac medications become too costly  Follow up in 6 months with [facility]  Follow up with Dr. Jose Celis in 12 months       Thank you for allowing me to participate in the care of your patient. Please do not hesitate to call. Ayanna Hagen DO, [facility] [place]  Interventional Cardiology     o: [phone]  [facility] [employer] [facility] [address], [address]      NOTE:  This report was transcribed using voice recognition software. Every effort was made to ensure accuracy; however, inadvertent computerized transcription errors may be present. Scribe's Attestation: This note was scribed in the presence of Dr. Jose Celis DO by Marcella Miramontes, CARI.    I, Ayanna Hagen, have personally performed the services described in this documentation as scribed by Brisa Lai. CARI [place] in my presence, and it is both accurate and complete. An electronic signature was used to authenticate this note.

## 2023-01-10 ENCOUNTER — OFFICE VISIT (OUTPATIENT)
Dept: CARDIOLOGY CLINIC | Age: 68
End: 2023-01-10
Payer: MEDICAID

## 2023-01-10 VITALS
HEART RATE: 66 BPM | DIASTOLIC BLOOD PRESSURE: 82 MMHG | WEIGHT: 197.5 LBS | OXYGEN SATURATION: 99 % | HEIGHT: 67 IN | BODY MASS INDEX: 31 KG/M2 | SYSTOLIC BLOOD PRESSURE: 128 MMHG

## 2023-01-10 DIAGNOSIS — I48.0 PAF (PAROXYSMAL ATRIAL FIBRILLATION) (HCC): ICD-10-CM

## 2023-01-10 DIAGNOSIS — I10 ESSENTIAL HYPERTENSION: ICD-10-CM

## 2023-01-10 DIAGNOSIS — I25.810 CORONARY ARTERY DISEASE INVOLVING CORONARY BYPASS GRAFT OF NATIVE HEART WITHOUT ANGINA PECTORIS: Primary | ICD-10-CM

## 2023-01-10 DIAGNOSIS — R07.9 CHEST PAIN, UNSPECIFIED TYPE: ICD-10-CM

## 2023-01-10 DIAGNOSIS — E78.5 DYSLIPIDEMIA: ICD-10-CM

## 2023-01-10 PROCEDURE — 3074F SYST BP LT 130 MM HG: CPT | Performed by: INTERNAL MEDICINE

## 2023-01-10 PROCEDURE — 93000 ELECTROCARDIOGRAM COMPLETE: CPT | Performed by: INTERNAL MEDICINE

## 2023-01-10 PROCEDURE — 1123F ACP DISCUSS/DSCN MKR DOCD: CPT | Performed by: INTERNAL MEDICINE

## 2023-01-10 PROCEDURE — 3079F DIAST BP 80-89 MM HG: CPT | Performed by: INTERNAL MEDICINE

## 2023-01-10 PROCEDURE — 99214 OFFICE O/P EST MOD 30 MIN: CPT | Performed by: INTERNAL MEDICINE

## 2023-01-10 NOTE — PATIENT INSTRUCTIONS
Get labwork    Echocardiogram    Call with questions/concerns or if any cardiac medications become too costly  Follow up in 6 months with [facility]  Follow up with Dr. Christine Ruiz in 12 months

## 2023-01-19 ENCOUNTER — PROCEDURE VISIT (OUTPATIENT)
Dept: CARDIOLOGY CLINIC | Age: 68
End: 2023-01-19
Payer: MEDICAID

## 2023-01-19 DIAGNOSIS — I48.0 PAF (PAROXYSMAL ATRIAL FIBRILLATION) (HCC): ICD-10-CM

## 2023-01-19 DIAGNOSIS — I10 ESSENTIAL HYPERTENSION: ICD-10-CM

## 2023-01-19 DIAGNOSIS — R07.9 CHEST PAIN, UNSPECIFIED TYPE: ICD-10-CM

## 2023-01-19 DIAGNOSIS — I25.810 CORONARY ARTERY DISEASE INVOLVING CORONARY BYPASS GRAFT OF NATIVE HEART WITHOUT ANGINA PECTORIS: ICD-10-CM

## 2023-01-19 LAB
LV EF: 55 %
LVEF MODALITY: NORMAL
PRO-BNP: 63 PG/ML (ref 0–124)

## 2023-01-19 PROCEDURE — 93306 TTE W/DOPPLER COMPLETE: CPT | Performed by: INTERNAL MEDICINE

## 2023-03-23 ENCOUNTER — NURSE ONLY (OUTPATIENT)
Dept: CARDIOLOGY CLINIC | Age: 68
End: 2023-03-23

## 2023-03-23 ENCOUNTER — APPOINTMENT (OUTPATIENT)
Dept: GENERAL RADIOLOGY | Age: 68
End: 2023-03-23
Payer: COMMERCIAL

## 2023-03-23 ENCOUNTER — APPOINTMENT (OUTPATIENT)
Dept: CT IMAGING | Age: 68
End: 2023-03-23
Payer: COMMERCIAL

## 2023-03-23 ENCOUNTER — APPOINTMENT (OUTPATIENT)
Dept: MRI IMAGING | Age: 68
End: 2023-03-23
Payer: COMMERCIAL

## 2023-03-23 ENCOUNTER — HOSPITAL ENCOUNTER (INPATIENT)
Age: 68
LOS: 1 days | Discharge: HOME | End: 2023-03-24
Attending: EMERGENCY MEDICINE | Admitting: INTERNAL MEDICINE
Payer: COMMERCIAL

## 2023-03-23 VITALS
SYSTOLIC BLOOD PRESSURE: 114 MMHG | BODY MASS INDEX: 30.93 KG/M2 | OXYGEN SATURATION: 98 % | HEIGHT: 67 IN | DIASTOLIC BLOOD PRESSURE: 70 MMHG | HEART RATE: 74 BPM

## 2023-03-23 DIAGNOSIS — R42 DIZZINESS: Primary | ICD-10-CM

## 2023-03-23 DIAGNOSIS — I65.01 STENOSIS OF RIGHT VERTEBRAL ARTERY: ICD-10-CM

## 2023-03-23 DIAGNOSIS — M25.512 ACUTE PAIN OF LEFT SHOULDER: ICD-10-CM

## 2023-03-23 LAB
ALBUMIN SERPL-MCNC: 4.1 G/DL (ref 3.4–5)
ALBUMIN/GLOB SERPL: 1.3 {RATIO} (ref 1.1–2.2)
ALP SERPL-CCNC: 146 U/L (ref 40–129)
ALT SERPL-CCNC: 25 U/L (ref 10–40)
ANION GAP SERPL CALCULATED.3IONS-SCNC: 8 MMOL/L (ref 3–16)
APTT BLD: 31 SEC (ref 23–34.3)
AST SERPL-CCNC: 19 U/L (ref 15–37)
BASOPHILS # BLD: 0 K/UL (ref 0–0.2)
BASOPHILS NFR BLD: 0.5 %
BILIRUB SERPL-MCNC: 0.7 MG/DL (ref 0–1)
BUN SERPL-MCNC: 13 MG/DL (ref 7–20)
CALCIUM SERPL-MCNC: 9.3 MG/DL (ref 8.3–10.6)
CHLORIDE SERPL-SCNC: 106 MMOL/L (ref 99–110)
CO2 SERPL-SCNC: 26 MMOL/L (ref 21–32)
CREAT SERPL-MCNC: 1.1 MG/DL (ref 0.8–1.3)
DEPRECATED RDW RBC AUTO: 14.1 % (ref 12.4–15.4)
EKG ATRIAL RATE: 67 BPM
EKG DIAGNOSIS: NORMAL
EKG P AXIS: 47 DEGREES
EKG P-R INTERVAL: 146 MS
EKG Q-T INTERVAL: 410 MS
EKG QRS DURATION: 70 MS
EKG QTC CALCULATION (BAZETT): 433 MS
EKG R AXIS: -17 DEGREES
EKG T AXIS: 38 DEGREES
EKG VENTRICULAR RATE: 67 BPM
EOSINOPHIL # BLD: 0 K/UL (ref 0–0.6)
EOSINOPHIL NFR BLD: 0.7 %
GFR SERPLBLD CREATININE-BSD FMLA CKD-EPI: >60 ML/MIN/{1.73_M2}
GLUCOSE SERPL-MCNC: 166 MG/DL (ref 70–99)
HCT VFR BLD AUTO: 41.3 % (ref 40.5–52.5)
HGB BLD-MCNC: 13.4 G/DL (ref 13.5–17.5)
INR PPP: 0.95 (ref 0.87–1.14)
LYMPHOCYTES # BLD: 1.8 K/UL (ref 1–5.1)
LYMPHOCYTES NFR BLD: 25.6 %
MAGNESIUM SERPL-MCNC: 2.2 MG/DL (ref 1.8–2.4)
MCH RBC QN AUTO: 29.8 PG (ref 26–34)
MCHC RBC AUTO-ENTMCNC: 32.5 G/DL (ref 31–36)
MCV RBC AUTO: 91.5 FL (ref 80–100)
MONOCYTES # BLD: 0.5 K/UL (ref 0–1.3)
MONOCYTES NFR BLD: 7.1 %
NEUTROPHILS # BLD: 4.7 K/UL (ref 1.7–7.7)
NEUTROPHILS NFR BLD: 66.1 %
NT-PROBNP SERPL-MCNC: 52 PG/ML (ref 0–124)
PLATELET # BLD AUTO: 259 K/UL (ref 135–450)
PMV BLD AUTO: 7.9 FL (ref 5–10.5)
POTASSIUM SERPL-SCNC: 4.4 MMOL/L (ref 3.5–5.1)
PROT SERPL-MCNC: 7.2 G/DL (ref 6.4–8.2)
PROTHROMBIN TIME: 12.5 SEC (ref 11.7–14.5)
RBC # BLD AUTO: 4.51 M/UL (ref 4.2–5.9)
SODIUM SERPL-SCNC: 140 MMOL/L (ref 136–145)
TROPONIN T SERPL-MCNC: <0.01 NG/ML
TROPONIN T SERPL-MCNC: <0.01 NG/ML
WBC # BLD AUTO: 7.1 K/UL (ref 4–11)

## 2023-03-23 PROCEDURE — 84484 ASSAY OF TROPONIN QUANT: CPT

## 2023-03-23 PROCEDURE — 70553 MRI BRAIN STEM W/O & W/DYE: CPT

## 2023-03-23 PROCEDURE — A9577 INJ MULTIHANCE: HCPCS | Performed by: PSYCHIATRY & NEUROLOGY

## 2023-03-23 PROCEDURE — 71045 X-RAY EXAM CHEST 1 VIEW: CPT

## 2023-03-23 PROCEDURE — 70450 CT HEAD/BRAIN W/O DYE: CPT

## 2023-03-23 PROCEDURE — 6370000000 HC RX 637 (ALT 250 FOR IP): Performed by: INTERNAL MEDICINE

## 2023-03-23 PROCEDURE — 99285 EMERGENCY DEPT VISIT HI MDM: CPT

## 2023-03-23 PROCEDURE — 6360000004 HC RX CONTRAST MEDICATION: Performed by: PSYCHIATRY & NEUROLOGY

## 2023-03-23 PROCEDURE — 93005 ELECTROCARDIOGRAM TRACING: CPT | Performed by: EMERGENCY MEDICINE

## 2023-03-23 PROCEDURE — 70498 CT ANGIOGRAPHY NECK: CPT

## 2023-03-23 PROCEDURE — 6360000004 HC RX CONTRAST MEDICATION: Performed by: PHYSICIAN ASSISTANT

## 2023-03-23 PROCEDURE — 83735 ASSAY OF MAGNESIUM: CPT

## 2023-03-23 PROCEDURE — 93010 ELECTROCARDIOGRAM REPORT: CPT | Performed by: INTERNAL MEDICINE

## 2023-03-23 PROCEDURE — 85025 COMPLETE CBC W/AUTO DIFF WBC: CPT

## 2023-03-23 PROCEDURE — 83880 ASSAY OF NATRIURETIC PEPTIDE: CPT

## 2023-03-23 PROCEDURE — 36415 COLL VENOUS BLD VENIPUNCTURE: CPT

## 2023-03-23 PROCEDURE — 85610 PROTHROMBIN TIME: CPT

## 2023-03-23 PROCEDURE — 6370000000 HC RX 637 (ALT 250 FOR IP): Performed by: PHYSICIAN ASSISTANT

## 2023-03-23 PROCEDURE — 80053 COMPREHEN METABOLIC PANEL: CPT

## 2023-03-23 PROCEDURE — 85730 THROMBOPLASTIN TIME PARTIAL: CPT

## 2023-03-23 PROCEDURE — 99222 1ST HOSP IP/OBS MODERATE 55: CPT | Performed by: INTERNAL MEDICINE

## 2023-03-23 PROCEDURE — 2140000000 HC CCU INTERMEDIATE R&B

## 2023-03-23 RX ORDER — ATORVASTATIN CALCIUM 40 MG/1
40 TABLET, FILM COATED ORAL NIGHTLY
Status: DISCONTINUED | OUTPATIENT
Start: 2023-03-23 | End: 2023-03-24 | Stop reason: HOSPADM

## 2023-03-23 RX ORDER — METOPROLOL TARTRATE 50 MG/1
50 TABLET, FILM COATED ORAL 2 TIMES DAILY
Status: DISCONTINUED | OUTPATIENT
Start: 2023-03-23 | End: 2023-03-24 | Stop reason: HOSPADM

## 2023-03-23 RX ORDER — ONDANSETRON 4 MG/1
4 TABLET, ORALLY DISINTEGRATING ORAL EVERY 8 HOURS PRN
Status: DISCONTINUED | OUTPATIENT
Start: 2023-03-23 | End: 2023-03-24 | Stop reason: HOSPADM

## 2023-03-23 RX ORDER — ENOXAPARIN SODIUM 100 MG/ML
40 INJECTION SUBCUTANEOUS DAILY
Status: DISCONTINUED | OUTPATIENT
Start: 2023-03-23 | End: 2023-03-24 | Stop reason: HOSPADM

## 2023-03-23 RX ORDER — MECLIZINE HCL 12.5 MG/1
25 TABLET ORAL 3 TIMES DAILY PRN
Status: DISCONTINUED | OUTPATIENT
Start: 2023-03-23 | End: 2023-03-24 | Stop reason: HOSPADM

## 2023-03-23 RX ORDER — SODIUM CHLORIDE 0.9 % (FLUSH) 0.9 %
10 SYRINGE (ML) INJECTION ONCE
Status: DISCONTINUED | OUTPATIENT
Start: 2023-03-23 | End: 2023-03-24 | Stop reason: HOSPADM

## 2023-03-23 RX ORDER — POLYETHYLENE GLYCOL 3350 17 G/17G
17 POWDER, FOR SOLUTION ORAL DAILY PRN
Status: DISCONTINUED | OUTPATIENT
Start: 2023-03-23 | End: 2023-03-24 | Stop reason: HOSPADM

## 2023-03-23 RX ORDER — LATANOPROST 50 UG/ML
1 SOLUTION/ DROPS OPHTHALMIC NIGHTLY
Status: DISCONTINUED | OUTPATIENT
Start: 2023-03-23 | End: 2023-03-24 | Stop reason: HOSPADM

## 2023-03-23 RX ORDER — ACETAMINOPHEN 500 MG
1000 TABLET ORAL EVERY 6 HOURS
Status: DISCONTINUED | OUTPATIENT
Start: 2023-03-23 | End: 2023-03-24 | Stop reason: HOSPADM

## 2023-03-23 RX ORDER — MECLIZINE HCL 12.5 MG/1
25 TABLET ORAL ONCE
Status: COMPLETED | OUTPATIENT
Start: 2023-03-23 | End: 2023-03-23

## 2023-03-23 RX ORDER — ONDANSETRON 2 MG/ML
4 INJECTION INTRAMUSCULAR; INTRAVENOUS EVERY 6 HOURS PRN
Status: DISCONTINUED | OUTPATIENT
Start: 2023-03-23 | End: 2023-03-24 | Stop reason: HOSPADM

## 2023-03-23 RX ORDER — AMLODIPINE BESYLATE 5 MG/1
2.5 TABLET ORAL DAILY
Status: DISCONTINUED | OUTPATIENT
Start: 2023-03-23 | End: 2023-03-24 | Stop reason: HOSPADM

## 2023-03-23 RX ADMIN — ACETAMINOPHEN 1000 MG: 500 TABLET ORAL at 21:52

## 2023-03-23 RX ADMIN — ASPIRIN 325 MG: 325 TABLET, COATED ORAL at 17:17

## 2023-03-23 RX ADMIN — LATANOPROST 1 DROP: 50 SOLUTION OPHTHALMIC at 21:52

## 2023-03-23 RX ADMIN — IOPAMIDOL 75 ML: 755 INJECTION, SOLUTION INTRAVENOUS at 10:33

## 2023-03-23 RX ADMIN — AMLODIPINE BESYLATE 2.5 MG: 5 TABLET ORAL at 17:17

## 2023-03-23 RX ADMIN — METOPROLOL TARTRATE 50 MG: 50 TABLET, FILM COATED ORAL at 21:51

## 2023-03-23 RX ADMIN — MECLIZINE 25 MG: 12.5 TABLET ORAL at 10:53

## 2023-03-23 RX ADMIN — ATORVASTATIN CALCIUM 40 MG: 40 TABLET, FILM COATED ORAL at 21:52

## 2023-03-23 RX ADMIN — GADOBENATE DIMEGLUMINE 18 ML: 529 INJECTION, SOLUTION INTRAVENOUS at 19:02

## 2023-03-23 ASSESSMENT — ENCOUNTER SYMPTOMS
BACK PAIN: 0
SHORTNESS OF BREATH: 0
COUGH: 0
DIARRHEA: 0
NAUSEA: 1
COLOR CHANGE: 0
STRIDOR: 0
WHEEZING: 0
VOMITING: 0
ABDOMINAL PAIN: 0
CONSTIPATION: 0

## 2023-03-23 ASSESSMENT — PAIN DESCRIPTION - DESCRIPTORS: DESCRIPTORS: ACHING

## 2023-03-23 ASSESSMENT — LIFESTYLE VARIABLES
HOW MANY STANDARD DRINKS CONTAINING ALCOHOL DO YOU HAVE ON A TYPICAL DAY: PATIENT DOES NOT DRINK
HOW OFTEN DO YOU HAVE A DRINK CONTAINING ALCOHOL: NEVER

## 2023-03-23 ASSESSMENT — PAIN DESCRIPTION - PAIN TYPE: TYPE: ACUTE PAIN

## 2023-03-23 ASSESSMENT — PAIN SCALES - GENERAL: PAINLEVEL_OUTOF10: 3

## 2023-03-23 ASSESSMENT — PAIN DESCRIPTION - LOCATION: LOCATION: SHOULDER

## 2023-03-23 ASSESSMENT — PAIN DESCRIPTION - ORIENTATION: ORIENTATION: LEFT

## 2023-03-23 NOTE — PROGRESS NOTES
Pt walked in office today and stated he is not feeling good.  put him on CS. I went and grab pt, took his vitals. His bp was 114/70 and pulse was 74, o2 was 98. He complaints of discomfortness in chest, light headed, pain in lt arm, sob w/exertion, dizzy and he was feeling warm. I relayed everything to [facility]. she advised to go to ER.

## 2023-03-23 NOTE — PROGRESS NOTES
Patient seen in ED, room 24. Admission completed with the following exceptions:  4 Eyes Assessment, Immunizations, Covid Vaccines, Rights and Responsibilities, Orientation to room, Plan of Care, Education/Learning Assessment and Education Plan, white board, height and weight, pain assessment and head to toe assessment. Patient is alert and oriented X 4. Patient lives at home with his spouse and is being admitted for dizziness. Home Medications as well as Outside Sources has been verbally reviewed with patient and updated as appropriate and is now Completed. Plan of care updated if indicated. All questions answered. Patient states he \"thinks\"he had glasses with him today but not noted in his room or jacket pocket; Patient states he thinks he may have taken them to CT scan; checked in triage room and spoke with CT and they stated he only had his cell phone. Informed patient of this and also ER nurse Antoinette Collins.

## 2023-03-23 NOTE — ED PROVIDER NOTES
stenosis or large vessel occlusion of the [id number]. CT HEAD WO CONTRAST   Final Result   Addendum (preliminary) 1 of 1   ADDENDUM:   The above findings were communicated to the licensed caregiver by the   [facility] [id number] at 11:13 a.m. on   03/23/2023 to PA PEDRO AND JERRI MESSER [facility]         Final   No acute intracranial abnormality. MDM:   This is a 59-year-old male who presents with complaint of postural dizziness. His neurological exam is nonfocal.  EKG no acute ischemic abnormality to my independent interpretation. Noncontrast head CT nothing acute to my independent interpretation. CTA of the head and neck shows severe stenosis of the right vertebral artery, per radiology possibly related to degenerative change from the adjacent cervical spine. No other significant stenosis. No LVO. For further details of the patient's emergency department visit, please see the advanced practice provider's documentation. Felicity Stafford MD     This report has been produced using speech recognition software and may contain errors related to that system including errors in grammar, punctuation, and spelling, as well as words and phrases that may be inappropriate. If there are any questions or concerns please feel free to contact the dictating provider for clarification.        Felicity Stafford MD  03/23/23 [id number]

## 2023-03-23 NOTE — CONSULTS
short, MLI  LAD: 80% short, ostial stenosis; three moderate sized diagonals with 90% ostial to proximal  D2 and 70% ostial D3; 40% mid LAD disease  LCx: large first marginal free of significant disease; 85% eccentric mid stenosis followed by 40% proximal OM2   RCA: moderate sized vessel with 95% distal stenosis just prior to two posterolateral branches and RPDA; 90% mid acute marginal      LVEDP: 8 mmHg   LVEF: 65%      MCOT 02/02/2021- 03/03/2021   Showed no atrial fibrillation,   PVC< 1%, PAC <1%, high , Low HR 50, Average HR 77. Diary symptoms with Sinus Rhythm    SPECT 5/23/22  Summary    Normal myocardial perfusion study. Normal LV size and systolic function. 1/19/2023 TTE   -Normal left ventricle size, mild concentric wall thickness, and systolic   function with an estimated ejection fraction of 55%.   -No regional wall motion abnormalities are seen.   -Normal diastolic function. E/e\"=9.95.   -Mild mitral regurgitation.   -Mild tricuspid regurgitation.   -Estimated pulmonary artery systolic pressure is normal at 30 mmHg assuming   a right atrial pressure of 3 mmHg. EKG 3/23/2023  NSR @ 67 bpm  Nonspecific T wave abnormality     ProBNP 52   Troponin <0.01     3/23/2023 CTA Head/neck  1. Severe stenosis of the right vertebral artery at the C6-C7 level with   slight poststenotic dilatation. This may be related to degenerative change   from the adjacent cervical spine. 2. No significant stenosis otherwise seen of the cervical carotid/vertebral   arteries. 3. No significant stenosis or large vessel occlusion of the [id number].          Impression/Recommendations    Mr. Paulette Matt is a 79 y.o. male patient with:     Dizziness   Vertebral artery stenosis, severe   CAD: CABG x5 (LIMA-LAD, SVG-RPDA, SVG to D2- D3-LPLB) January 2021 Dr. Waynard Bernheim   PAF: post op, asymptomatic, s/p TORREY ligation, under the care of Dr. Allison Genao, without documented recurrence    Hypertension, controlled  Hyperlipidemia, controlled    Borderline diabetes mellitus        Previous 30 day MCOT without tachy or tony -arrhythmias. Nonischemic stress testing less than one year ago. Echo two months ago with preserved LVEF, no significant valvular disease. No acute changes in cardiac workup here. Again describes musculoskeletal pain, now second to bigger concern for dizziness. Await Neurology input regarding CTA head/neck and pending MRI brain. No further cardiac testing at this time. No change to medications. Consider discharging with MCOT. Thank you for allowing me to participate in the care of your patient. Please do not hesitate to call. Parris Bloom DO, [facility]  Interventional Cardiology     o: [phone]  [address] [employer], [address]      NOTE:  This report was transcribed using voice recognition software. Every effort was made to ensure accuracy; however, inadvertent computerized transcription errors may be present.

## 2023-03-23 NOTE — PROGRESS NOTES
Notified that pt had arrived to office for vitals check. When MA was performing vitals check pt complained of Dizziness, SOB, and L shoulder pain radiated down arm. Fili Soto found this RN and asked that I see pt. Went to pt, VSS, patient SOB. Endorsed dizziness and \"slight\" chest pressure. Reported L shoulder pain that radiated down arm. Asked patient if he had ever had these symptoms before, reported the arm pain felt \"exactly\" like it did prior to his CABG in 2021. Accompanied pt to ED here at [facility]. BNN notified and in agreement with plan.

## 2023-03-23 NOTE — H&P
amLODIPine (NORVASC) 2.5 MG tablet TAKE 1 TABLET BY MOUTH EVERY DAY 6/28/22   ALEXANDER Rendon CNP   metoprolol tartrate (LOPRESSOR) 50 MG tablet TAKE 1 TABLET BY MOUTH 2 TIMES DAILY 6/21/22   Gabriela Pain, DO   aspirin 325 MG EC tablet TAKE 1 TABLET BY MOUTH EVERY DAY 5/11/22   ALEXANDER Rendon CNP   latanoprost (XALATAN) 0.005 % ophthalmic solution Place 1 drop into both eyes nightly  1/6/20   Historical Provider, MD       Allergies:  Patient has no known allergies. Social History:      TOBACCO:   reports that he has never smoked. He has never used smokeless tobacco.  ETOH:   reports no history of alcohol use. Family History:          Problem Relation Age of Onset    High Blood Pressure Mother     Diabetes Mother     High Blood Pressure Father     Heart Disease Paternal Uncle        REVIEW OF SYSTEMS:   Pertinent positives as noted in the HPI. All other systems reviewed and negative. PHYSICAL EXAM:    /81   Pulse 73   Temp 98.4 °F (36.9 °C) (Oral)   Resp 16   Ht 5' 7\" (1.702 m)   Wt 197 lb (89.4 kg)   SpO2 100%   BMI 30.85 kg/m²     Gen/overall appearance: Not in acute distress. Alert. Head: Normocephalic, atraumatic  Eyes: EOMI, no scleral icterus  CVS: regular rate and rhythm, Normal S1S2  Pulm: Clear to auscultation bilaterally. No crackles/wheezes  Gastrointestinal: Soft, nontender, nondistended, no guarding or rebound  Extremities: No edema.  No erythema or warmth  Neuro: No gross focal deficits noted  Skin: Warm, dry    Labs:     Recent Labs     03/23/23  1033   WBC 7.1   HGB 13.4*   HCT 41.3        Recent Labs     03/23/23  1033      K 4.4      CO2 26   BUN 13   CREATININE 1.1   CALCIUM 9.3     Recent Labs     03/23/23  1033   AST 19   ALT 25   BILITOT 0.7   ALKPHOS 146*     Recent Labs     03/23/23  1033   INR 0.95     Recent Labs     03/23/23  1033   TROPONINI <0.01       Urinalysis:      Lab Results   Component Value Date/Time    NITRU

## 2023-03-23 NOTE — ED PROVIDER NOTES
Constitutional:  Negative for chills and fever. HENT: Negative. Eyes:  Negative for visual disturbance. Respiratory:  Negative for cough, shortness of breath, wheezing and stridor. Cardiovascular:  Positive for chest pain. Negative for palpitations and leg swelling. Gastrointestinal:  Positive for nausea. Negative for abdominal pain, constipation, diarrhea and vomiting. Endocrine: Negative. Genitourinary: Negative. Musculoskeletal:  Positive for myalgias. Negative for back pain, neck pain and neck stiffness. Skin:  Negative for color change, pallor, rash and wound. Neurological:  Positive for dizziness. Negative for tremors, seizures, syncope, facial asymmetry, speech difficulty, weakness, light-headedness, numbness and headaches. Psychiatric/Behavioral:  Negative for confusion. All other systems reviewed and are negative. Positives and Pertinent negatives as per HPI. SURGICAL HISTORY     Past Surgical History:   Procedure Laterality Date    COLONOSCOPY  05/04/2018    CORONARY ARTERY BYPASS GRAFT N/A 1/8/2021    URGENT CABG X5, CORONARY ARTERY BYPASS COOK TO LAD, VEIN GRAFT TO RPDA, SEQUENTAIL VEIN GRAFT TO D3, D2 AND PL BRANCH CIRCUMFLEX, LIGATION LEFT ATRIAL APPENDAGE 40MM ATRICLIP, EVH LEFT LEG, ENDOSCOPICALLY VEIN HARVEST OF LEFT SAPHENOUS VEIN, GRETA AORTIC ULTRASOUND, DOPPLER VERIFICATION OF FLOW TO CONFIRM BYPASS GRAFTS, INSERTION OF VENTRICULAR PACING WIRES, BILATERAL INCOSTAL NEVRE BLOCK 5TH LEVEL W       CURRENTMEDICATIONS       Previous Medications    ACETAMINOPHEN (TYLENOL) 500 MG TABLET    Take 2 tablets by mouth in the morning and 2 tablets at noon and 2 tablets in the evening and 2 tablets before bedtime.     AMLODIPINE (NORVASC) 2.5 MG TABLET    TAKE 1 TABLET BY MOUTH EVERY DAY    ASPIRIN 325 MG EC TABLET    TAKE 1 TABLET BY MOUTH EVERY DAY    ATORVASTATIN (LIPITOR) 40 MG TABLET    TAKE 1 TABLET BY MOUTH EVERY DAY AT NIGHT    LATANOPROST (XALATAN) 0.005 % MEDICATIONS:  Discontinued Medications    No medications on file              (Please note that portions of this note were completed with a voice recognition program.  Efforts were made to edit the dictations but occasionally words are mis-transcribed.)    Sole Shin PA-C (electronically signed)            Sole Shin PA-C  03/23/23 [id number]

## 2023-03-24 VITALS
OXYGEN SATURATION: 100 % | SYSTOLIC BLOOD PRESSURE: 104 MMHG | HEART RATE: 77 BPM | DIASTOLIC BLOOD PRESSURE: 78 MMHG | HEIGHT: 67 IN | RESPIRATION RATE: 16 BRPM | BODY MASS INDEX: 30.62 KG/M2 | WEIGHT: 195.11 LBS | TEMPERATURE: 97 F

## 2023-03-24 LAB
CHOLEST SERPL-MCNC: 101 MG/DL (ref 0–199)
DEPRECATED RDW RBC AUTO: 13.9 % (ref 12.4–15.4)
EST. AVERAGE GLUCOSE BLD GHB EST-MCNC: 145.6 MG/DL
HBA1C MFR BLD: 6.7 %
HCT VFR BLD AUTO: 41.6 % (ref 40.5–52.5)
HDLC SERPL-MCNC: 36 MG/DL (ref 40–60)
HGB BLD-MCNC: 13.5 G/DL (ref 13.5–17.5)
LDLC SERPL CALC-MCNC: 47 MG/DL
MCH RBC QN AUTO: 29.7 PG (ref 26–34)
MCHC RBC AUTO-ENTMCNC: 32.5 G/DL (ref 31–36)
MCV RBC AUTO: 91.3 FL (ref 80–100)
PLATELET # BLD AUTO: 256 K/UL (ref 135–450)
PMV BLD AUTO: 7.9 FL (ref 5–10.5)
RBC # BLD AUTO: 4.56 M/UL (ref 4.2–5.9)
TRIGL SERPL-MCNC: 91 MG/DL (ref 0–150)
VLDLC SERPL CALC-MCNC: 18 MG/DL
WBC # BLD AUTO: 6.4 K/UL (ref 4–11)

## 2023-03-24 PROCEDURE — 97116 GAIT TRAINING THERAPY: CPT

## 2023-03-24 PROCEDURE — 83036 HEMOGLOBIN GLYCOSYLATED A1C: CPT

## 2023-03-24 PROCEDURE — 99222 1ST HOSP IP/OBS MODERATE 55: CPT | Performed by: PSYCHIATRY & NEUROLOGY

## 2023-03-24 PROCEDURE — 6360000002 HC RX W HCPCS: Performed by: INTERNAL MEDICINE

## 2023-03-24 PROCEDURE — 80061 LIPID PANEL: CPT

## 2023-03-24 PROCEDURE — 92610 EVALUATE SWALLOWING FUNCTION: CPT

## 2023-03-24 PROCEDURE — 6370000000 HC RX 637 (ALT 250 FOR IP): Performed by: INTERNAL MEDICINE

## 2023-03-24 PROCEDURE — 97161 PT EVAL LOW COMPLEX 20 MIN: CPT

## 2023-03-24 PROCEDURE — 97535 SELF CARE MNGMENT TRAINING: CPT

## 2023-03-24 PROCEDURE — 92526 ORAL FUNCTION THERAPY: CPT

## 2023-03-24 PROCEDURE — 97165 OT EVAL LOW COMPLEX 30 MIN: CPT

## 2023-03-24 PROCEDURE — 85027 COMPLETE CBC AUTOMATED: CPT

## 2023-03-24 RX ORDER — MECLIZINE HYDROCHLORIDE 25 MG/1
25 TABLET ORAL 3 TIMES DAILY PRN
Qty: 30 TABLET | Refills: 0 | Status: SHIPPED | OUTPATIENT
Start: 2023-03-24 | End: 2023-04-03

## 2023-03-24 RX ADMIN — ACETAMINOPHEN 1000 MG: 500 TABLET ORAL at 08:37

## 2023-03-24 RX ADMIN — AMLODIPINE BESYLATE 2.5 MG: 5 TABLET ORAL at 08:37

## 2023-03-24 RX ADMIN — METOPROLOL TARTRATE 50 MG: 50 TABLET, FILM COATED ORAL at 08:37

## 2023-03-24 RX ADMIN — ASPIRIN 325 MG: 325 TABLET, COATED ORAL at 08:37

## 2023-03-24 RX ADMIN — ENOXAPARIN SODIUM 40 MG: 100 INJECTION SUBCUTANEOUS at 08:37

## 2023-03-24 RX ADMIN — ACETAMINOPHEN 1000 MG: 500 TABLET ORAL at 05:03

## 2023-03-24 ASSESSMENT — PAIN DESCRIPTION - LOCATION: LOCATION: ABDOMEN

## 2023-03-24 ASSESSMENT — PAIN SCALES - GENERAL
PAINLEVEL_OUTOF10: 0
PAINLEVEL_OUTOF10: 2
PAINLEVEL_OUTOF10: 0

## 2023-03-24 NOTE — PROGRESS NOTES
Awake,denies dizziness  CTA head and neck - severe stenosis Right vertebral artery, C6-C7 level. MRI brain neg  Tolerating metoprolol and amlodipine.   Neuro consult noted  Seen by PT/OT  Ambulating

## 2023-03-24 NOTE — CONSULTS
Alcohol use: No     Alcohol/week: 0.8 standard drinks     Types: 1 Standard drinks or equivalent per week    Drug use: No     No Known Allergies  Current Facility-Administered Medications   Medication Dose Route Frequency Provider Last Rate Last Admin    latanoprost (XALATAN) 0.005 % ophthalmic solution 1 drop  1 drop Both Eyes Nightly Brady Tomas MD   1 drop at 03/23/23 2152    aspirin EC tablet 325 mg  325 mg Oral Daily Brady Tomas MD   325 mg at 03/24/23 0837    metoprolol tartrate (LOPRESSOR) tablet 50 mg  50 mg Oral BID Brady Tomas MD   50 mg at 03/24/23 1515    atorvastatin (LIPITOR) tablet 40 mg  40 mg Oral Nightly Brady Tomas MD   40 mg at 03/23/23 2152    amLODIPine (NORVASC) tablet 2.5 mg  2.5 mg Oral Daily Brady Tomas MD   2.5 mg at 03/24/23 7932    acetaminophen (TYLENOL) tablet 1,000 mg  1,000 mg Oral Q6H Brady Tomas MD   1,000 mg at 03/24/23 0837    ondansetron (ZOFRAN-ODT) disintegrating tablet 4 mg  4 mg Oral Q8H PRN Brady Tomas MD        Or    ondansetron [facility]) injection 4 mg  4 mg IntraVENous Q6H PRN Brady Tomas MD        polyethylene glycol [facility]) packet 17 g  17 g Oral Daily PRN Brady Tomas MD        enoxaparin (LOVENOX) injection 40 mg  40 mg SubCUTAneous Daily Brady Tomas MD   40 mg at 03/24/23 2681    perflutren lipid microspheres (DEFINITY) injection 1.5 mL  1.5 mL IntraVENous ONCE PRN Brady Tomas MD        meclizine (ANTIVERT) tablet 25 mg  25 mg Oral TID PRN Brady Tomas MD        sodium chloride flush 0.9 % injection 10 mL  10 mL IntraVENous Once Adonis Shane MD           ROS : A 10-14 system review of constitutional, cardiovascular, respiratory, eyes, musculoskeletal, endocrine, GI, ENT, skin, hematological, genitourinary, psychiatric and neurologic systems was obtained and updated today and is unremarkable except as mentioned in my HPI      Exam:     Constitutional:   Vitals:    03/24/23 05:05 AM    HGB 13.5 03/24/2023 05:05 AM    HCT 41.6 03/24/2023 05:05 AM    MCV 91.3 03/24/2023 05:05 AM    RDW 13.9 03/24/2023 05:05 AM     03/24/2023 05:05 AM     Lab Results   Component Value Date    INR 0.95 03/23/2023    PROTIME 12.5 03/23/2023       Neuroimaging was independently reviewed by myself and discussed results with the patient and/or family  Reviewed notes from different physicians  Reviewed lab and blood testing    Impression:  New onset dizziness and ataxia. Possible vertebrobasilar insufficiency versus peripheral vertigo. Right vertebral stenosis, maximize medical therapy. Hypertension, not controlled  Hyperlipidemia  CAD    Recommendation:  Stroke prevention and stroke education discussed in detail with the patient and his family  Echo  PT and OT  Vestibular training  Aspirin and statin  Blood pressure monitor and blood pressure control tightly at home  Telemetry  DVT and GI prophylaxis  Continue home blood pressure medications  Follow LDL and A1c  Vestibular precautions  Can be discharged from neurology once medically stable  No further recommendation      Thank you for referring such patient. If you have any questions regarding my consult note, please don't hesitate to call me. Gerard Barrett MD  [phone]    This dictation was generated by voice recognition computer software.  Although all attempts are made to edit the dictation for accuracy, there may be errors in the  transcription that are not intended

## 2023-03-24 NOTE — DISCHARGE SUMMARY
Hospital Medicine Discharge Summary    Patient ID: Monique Garcia      Patient's PCP: Angie Alejo MD    Admit Date: 3/23/2023     Discharge Date: 3/24/2023    Admitting Physician: Gerard Peña MD     Discharge Physician: Gladys Najera MD     Discharge Diagnoses and Hospital Course: Active Hospital Problems    Diagnosis Date Noted    Dizziness [R42] 12/21/2020       Severe R vertebral stenosis  Dizziness; suspect vertigo. Clinically improved  - CT head non-acute  - CT-A with severe R vertebal stenosis  - MRI brain unremarkable  - ECHO unchanged from 2 months prior cardio  - c/w ASA and statin   - trial of meclizine  - monitor tele  - s/p IVF hydration  - neuro consulted; no intervention     PMH of CAD s/p CABG, htn, hld      Exam:     The patient was seen and examined on day of discharge and this discharge summary is in conjunction with any daily progress note from day of discharge. Consults:     IP CONSULT TO NEUROLOGY  IP CONSULT TO CARDIOLOGY    Disposition:  home     Condition:  Stable    Discharge Instructions/Follow-up:   Pt to follow up with PCP within 1 week  Consultants as scheduled    Code Status:  Full Code    Activity: activity as tolerated    Diet: Resume home diet    Labs:  For convenience and continuity at follow-up the following most recent labs are provided:      CBC:    Lab Results   Component Value Date/Time    WBC 6.4 03/24/2023 05:05 AM    HGB 13.5 03/24/2023 05:05 AM    HCT 41.6 03/24/2023 05:05 AM     03/24/2023 05:05 AM       Renal:    Lab Results   Component Value Date/Time     03/23/2023 10:33 AM    K 4.4 03/23/2023 10:33 AM     03/23/2023 10:33 AM    CO2 26 03/23/2023 10:33 AM    BUN 13 03/23/2023 10:33 AM    CREATININE 1.1 03/23/2023 10:33 AM    CALCIUM 9.3 03/23/2023 10:33 AM       Discharge Medications:     Current Discharge Medication List             Details   meclizine (ANTIVERT) 25 MG tablet Take 1 tablet by mouth 3 times daily as needed for Dizziness  Qty: 30 tablet, Refills: 0                Details   acetaminophen (TYLENOL) 500 MG tablet Take 2 tablets by mouth in the morning and 2 tablets at noon and 2 tablets in the evening and 2 tablets before bedtime. Qty: 120 tablet, Refills: 3      atorvastatin (LIPITOR) 40 MG tablet TAKE 1 TABLET BY MOUTH EVERY DAY AT NIGHT  Qty: 90 tablet, Refills: 3      amLODIPine (NORVASC) 2.5 MG tablet TAKE 1 TABLET BY MOUTH EVERY DAY  Qty: 90 tablet, Refills: 3    Associated Diagnoses: S/P CABG (coronary artery bypass graft); Pleural effusion; S/P CABG x 5; Atrial fibrillation, persistent ([address] Pericardial effusion; S/P thoracentesis      metoprolol tartrate (LOPRESSOR) 50 MG tablet TAKE 1 TABLET BY MOUTH 2 TIMES DAILY  Qty: 180 tablet, Refills: 3      aspirin 325 MG EC tablet TAKE 1 TABLET BY MOUTH EVERY DAY  Qty: 90 tablet, Refills: 3      latanoprost (XALATAN) 0.005 % ophthalmic solution Place 1 drop into both eyes nightly              Time Spent on discharge is more than 45 minutes in the examination, evaluation, counseling and review of medications and discharge plan. Signed:    Sierra Sharif MD   3/24/2023    Thank you Mi Espinoza MD for the opportunity to be involved in this patient's care.

## 2023-03-24 NOTE — PROGRESS NOTES
[facility]   Phone: [phone]    Occupational Therapy    [x] Initial Evaluation            [] Daily Treatment Note         [x] Discharge Summary      Patient: Lucille Martines   : 1955   MRN: [id number]   Date of Service:  3/24/2023    Admitting Diagnosis:  Dizziness  Current Admission Summary: 79 y.o. male with PMH of CAD s/p CABG, htn, hld who presents with dizziness. Woke up approximately 7am this morning and started to feel dizzy. Symptoms worsened with ambulation and some positions. Associated with vertigo, mild headache, and some nausea. Denies focal motor deficits, tinnitus, syncope, palpitations, chest pain, gross bleeding. Does endorse intermittent chest pains at times. CT-A in ED found remarkable for severe R vertebral stenosis  Past Medical History:  has a past medical history of Coronary artery disease involving native heart, Essential hypertension, and Hyperlipidemia. Past Surgical History:  has a past surgical history that includes Colonoscopy (2018) and Coronary artery bypass graft (N/A, 2021). Discharge Recommendations: Lucille Martines scored a  on the [facility] ADL Inpatient form. At this time, no further OT is recommended upon discharge due to patient at independent level. Recommend patient returns to prior setting with prior services.       DME Required For Discharge: No DME required    Precautions/Restrictions: low fall risk  Positional Restrictions:no positional restrictions    Pre-Admission Information   Lives With: spouse                  Type of Home: house  Home Layout: two level, laundry in basement, 8 stairs to 2nd level with L HR  Home Access: level entry  [facility]: tub/shower unit  Bathroom Equipment:  N/A  Toilet Height: standard height  Home Equipment: no prior equipment  Transfer Assistance: Independent without use of device  Ambulation Assistance:Independent without use of device  ADL Assistance:

## 2023-03-24 NOTE — PROGRESS NOTES
Recommend continuation of the regular textured diet with thin liquids and medications whole with water or in puree. Pt will benefit from ST follow 1-2 times to ensure diet tolerance. Recommended Diet and Intervention 3/24/2023:  Diet Solids Recommendation:  Regular texture diet  Liquid Consistency Recommendation: Thin liquids  Recommended form of Meds: Meds whole with water or Meds in puree           Compensatory Swallowing Strategies: Alternate solids/liquids , Upright as possible with all PO intake , Eat/feed slowly, Remain upright 30-45 min     SHORT TERM DYSPHAGIA GOALS/PLAN OF CARE: Speech therapy for dysphagia tx 1-2 times to ensure diet tolerance.   Pt will functionally tolerate recommended diet with no overt clinical s/s of aspiration     Dysphagia Therapeutic Intervention:  Diet Tolerance Monitoring , Patient/Family Education     Discharge Recommendations: Discharge recommendations to be determined pending ongoing follow-up during acute care stay    Patient Positioning: Upright in bed     Current Diet Level (prior to evaluation): Regular texture diet  Thin liquids      Respiratory Status:   [x]Room Air   []O2 via nasal cannula   []Other:    Dentition:  [x]Adequate  []Dentures   []Missing Many Teeth  []Edentulous  [x]Other: pt reports having some missing teeth in the back, reports that his missing teeth do not affect his mastication    Baseline Vocal Quality:  [x]Normal  []Dysphonic   []Aphonic   []Hoarse  []Wet  []Weak  []Other:    Volitional Cough:  Not Elicited     Volitional Swallow:   []Absent   []Delayed     [x]Adequate     []Required use of drink     Oral Mechanism Exam:  []WFL [x]Mild   [] Moderate  []Severe  []To be assessed  Impaired:   []Left side      []Right side    []Labial ROM/Coordination    []Labial Symmetry   [x]Lingual ROM/Coordination   []Lingual Symmetry  []Gag  []Other:     Oral Phase: []WFL [x]Mild   [] Moderate  []Severe  []To be assessed   [x]Impaired/Prolonged

## 2023-03-24 NOTE — PROGRESS NOTES
equipment  Transfer Assistance: Independent without use of device  Ambulation Assistance:Independent without use of device  ADL Assistance: independent with all ADL's  IADL Assistance: independent with homemaking tasks  Active :        [x] Yes  [] No  Hand Dominance: [] Left  [x] Right  Current Employment: retired. Occupation: Consultant work  Hobbies: [employer]  Recent Falls: Pt denies falls in the past 6 months    Examination   Vision:   Vision Gross Assessment: Impaired and Vision Corrective Device: wears glasses for distance  Hearing:   WFL  Observation:   General Observation:  Pt on RA on arrival.  Posture:   Mild forward head, rounded shoulders, and increased thoracic kyphosis in sitting and standing. Sensation:   denies numbness and tingling  ROM:   (B) LE AROM WFL  Strength:   (B) LE strength grossly WFL  Therapist Clinical Decision Making (Complexity): low complexity  Clinical Presentation: stable      Subjective  General: Pt seated in a chair on arrival, agreeable to participate in PT/OT initial evaluation. Pain: 3/10. Location: L arm \"ache\"  Pain Interventions: repositioned and mobilized     Functional Mobility  Bed Mobility  Bed mobility not completed on this date. Comments: Pt seated in a chair on arrival and in the recliner at the end of the initial evaluation. Transfers  Sit to stand transfer: Independent  Stand to sit transfer: Independent  Comments:  Ambulation  Surface:level surface  Assistive Device: no device  Assistance: Independent  Distance: 270'  Gait Mechanics: Normal- no obvious gait abnormalities   Comments:    Stair Mobility  Number of Steps: 3  Step Height: 6 inch  Hand Rails: (L) ascending handrail  Device: no device  Assistance: modified independent  Comments:  Wheelchair Mobility:  No w/c mobility completed on this date.   Comments:  Balance  Static Sitting Balance: good: independent with functional balance in unsupported position  Dynamic Sitting Balance: good: independent with functional balance in unsupported position  Static Standing Balance: good: independent with functional balance in unsupported position  Dynamic Standing Balance: good: independent with functional balance in unsupported position  Comments:    Other Therapeutic Interventions  Pt stood at the sink independently and performed upper body dressing, upper body bathing, and grooming. See OT note for assist levels for ADLs. Functional Outcomes  AM-PAC Inpatient Mobility Raw Score : 24              Cognition  WFL  Orientation:    alert and oriented x 4  Command Following:   [facility]    Education  Barriers To Learning: none  Patient Education: patient educated on goals, PT role and benefits, plan of care, general safety, functional mobility training, discharge recommendations  Learning Assessment:  patient verbalizes and demonstrates understanding    Assessment  Activity Tolerance: Pt tolerated the PT/OT initial evaluation well with no significant limitations. Impairments Requiring Therapeutic Intervention: none - eval with same day discharge  Prognosis: good  Clinical Assessment: Pt is a 78 y/o male who presents to the hospital for dizziness. Pt is presenting at his baseline level of function of independent and no further skilled PT is recommended at this time. Safety Interventions: patient left in chair, call light within reach, nurse notified, and patient up ad jesika     Plan  Frequency: Eval with same day discharge. No follow up required. Current Treatment Recommendations: not applicable, evaluation completed with same day discharge. Goals  Patient Goals: To return home   Short Term Goals:  Patient eval with same day discharge. No goals set as patient is at baseline mobility status.       Therapy Session Time      Individual Group Co-treatment   Time In     0946   Time Out     1009   Minutes     23     Timed Code Treatment Minutes: 8 Minutes  Total Treatment Minutes: 23 Minutes        Electronically

## 2023-04-11 DIAGNOSIS — Z00.00 WELL ADULT EXAM: ICD-10-CM

## 2023-04-11 LAB — PSA SERPL DL<=0.01 NG/ML-MCNC: 6.37 NG/ML (ref 0–4)

## 2023-04-21 ENCOUNTER — TELEPHONE (OUTPATIENT)
Dept: INTERNAL MEDICINE CLINIC | Age: 68
End: 2023-04-21

## 2023-07-10 ENCOUNTER — OFFICE VISIT (OUTPATIENT)
Dept: CARDIOLOGY CLINIC | Age: 68
End: 2023-07-10
Payer: COMMERCIAL

## 2023-07-10 VITALS
DIASTOLIC BLOOD PRESSURE: 70 MMHG | SYSTOLIC BLOOD PRESSURE: 110 MMHG | BODY MASS INDEX: 30.97 KG/M2 | HEIGHT: 67 IN | HEART RATE: 67 BPM | WEIGHT: 197.3 LBS | OXYGEN SATURATION: 96 %

## 2023-07-10 DIAGNOSIS — I10 PRIMARY HYPERTENSION: ICD-10-CM

## 2023-07-10 DIAGNOSIS — I25.810 CORONARY ARTERY DISEASE INVOLVING CORONARY BYPASS GRAFT OF NATIVE HEART WITHOUT ANGINA PECTORIS: Primary | ICD-10-CM

## 2023-07-10 DIAGNOSIS — E78.2 MIXED HYPERLIPIDEMIA: ICD-10-CM

## 2023-07-10 PROCEDURE — 3078F DIAST BP <80 MM HG: CPT | Performed by: NURSE PRACTITIONER

## 2023-07-10 PROCEDURE — 99214 OFFICE O/P EST MOD 30 MIN: CPT | Performed by: NURSE PRACTITIONER

## 2023-07-10 PROCEDURE — 1123F ACP DISCUSS/DSCN MKR DOCD: CPT | Performed by: NURSE PRACTITIONER

## 2023-07-10 PROCEDURE — 3074F SYST BP LT 130 MM HG: CPT | Performed by: NURSE PRACTITIONER

## 2023-07-10 RX ORDER — ACETAMINOPHEN 325 MG/1
650 TABLET ORAL EVERY 6 HOURS PRN
COMMUNITY

## 2023-07-10 NOTE — PROGRESS NOTES
[address] [facility]     Outpatient Follow Up Note    Micky Perry is 79 y.o. male who presents today with a history of CAD s/p CABG with TORREY ligation Jan '21 with post-op pleural effusion s/p thoracentesis;  HTN and hyperlipidemia      CHIEF COMPLAINT / HPI:  Follow Up secondary to CAD     Subjective:     He has the same discomfort when reaching described as a stabbing sensation. He denies (d) significant chest pain. His angina equivalent was feeling an ache in his left chest when exposed to cold air. He denies recurrence. The patient denies orthopnea/PND. He denies swelling though his wife thought his hands looked swollen yesterday. The patients weight is stable . The patient is not experiencing palpitations / light headedness (unless he saadia over to tie his shoes)  His home BP runs ~ 120/     These symptoms show no change since the last OV with similar symptoms. With regard to medication therapy the patient has been compliant with prescribed regimen. They have tolerated therapy to date.      Past Medical History:   Diagnosis Date    Coronary artery disease involving native heart     Essential hypertension 5/28/2021    Hyperlipidemia 12/21/2020     Social History:    Social History     Tobacco Use   Smoking Status Never   Smokeless Tobacco Never     Current Medications:  Current Outpatient Medications   Medication Sig Dispense Refill    acetaminophen (TYLENOL) 325 MG tablet Take 2 tablets by mouth every 6 hours as needed for Pain      aspirin 325 MG EC tablet TAKE 1 TABLET BY MOUTH EVERY DAY 90 tablet 3    amLODIPine (NORVASC) 2.5 MG tablet TAKE 1 TABLET BY MOUTH EVERY DAY 90 tablet 3    atorvastatin (LIPITOR) 40 MG tablet Take 1 tablet by mouth nightly 90 tablet 3    metoprolol tartrate (LOPRESSOR) 50 MG tablet Take 1 tablet by mouth 2 times daily 180 tablet 3    latanoprost (XALATAN) 0.005 % ophthalmic solution Place 1 drop into both eyes nightly (Patient not taking: Reported on 7/10/2023)       No

## 2023-08-18 ENCOUNTER — OFFICE VISIT (OUTPATIENT)
Dept: INTERNAL MEDICINE CLINIC | Age: 68
End: 2023-08-18
Payer: COMMERCIAL

## 2023-08-18 VITALS
HEIGHT: 67 IN | RESPIRATION RATE: 18 BRPM | DIASTOLIC BLOOD PRESSURE: 78 MMHG | HEART RATE: 66 BPM | BODY MASS INDEX: 31.08 KG/M2 | SYSTOLIC BLOOD PRESSURE: 114 MMHG | WEIGHT: 198 LBS | TEMPERATURE: 97.6 F | OXYGEN SATURATION: 98 %

## 2023-08-18 DIAGNOSIS — E11.9 TYPE 2 DIABETES MELLITUS WITHOUT COMPLICATION, WITHOUT LONG-TERM CURRENT USE OF INSULIN (HCC): Primary | ICD-10-CM

## 2023-08-18 DIAGNOSIS — E11.9 TYPE 2 DIABETES MELLITUS WITHOUT COMPLICATION, WITHOUT LONG-TERM CURRENT USE OF INSULIN (HCC): ICD-10-CM

## 2023-08-18 LAB
ALBUMIN SERPL-MCNC: 4.2 G/DL (ref 3.4–5)
ALBUMIN/GLOB SERPL: 1.8 {RATIO} (ref 1.1–2.2)
ALP SERPL-CCNC: 140 U/L (ref 40–129)
ALT SERPL-CCNC: 21 U/L (ref 10–40)
ANION GAP SERPL CALCULATED.3IONS-SCNC: 8 MMOL/L (ref 3–16)
AST SERPL-CCNC: 17 U/L (ref 15–37)
BILIRUB SERPL-MCNC: 0.5 MG/DL (ref 0–1)
BUN SERPL-MCNC: 10 MG/DL (ref 7–20)
CALCIUM SERPL-MCNC: 9.4 MG/DL (ref 8.3–10.6)
CHLORIDE SERPL-SCNC: 106 MMOL/L (ref 99–110)
CO2 SERPL-SCNC: 29 MMOL/L (ref 21–32)
CREAT SERPL-MCNC: 1 MG/DL (ref 0.8–1.3)
GFR SERPLBLD CREATININE-BSD FMLA CKD-EPI: >60 ML/MIN/{1.73_M2}
GLUCOSE SERPL-MCNC: 111 MG/DL (ref 70–99)
POTASSIUM SERPL-SCNC: 4.5 MMOL/L (ref 3.5–5.1)
PROT SERPL-MCNC: 6.6 G/DL (ref 6.4–8.2)
SODIUM SERPL-SCNC: 143 MMOL/L (ref 136–145)

## 2023-08-18 PROCEDURE — 3078F DIAST BP <80 MM HG: CPT | Performed by: INTERNAL MEDICINE

## 2023-08-18 PROCEDURE — 3044F HG A1C LEVEL LT 7.0%: CPT | Performed by: INTERNAL MEDICINE

## 2023-08-18 PROCEDURE — 3074F SYST BP LT 130 MM HG: CPT | Performed by: INTERNAL MEDICINE

## 2023-08-18 PROCEDURE — 99213 OFFICE O/P EST LOW 20 MIN: CPT | Performed by: INTERNAL MEDICINE

## 2023-08-18 PROCEDURE — 1123F ACP DISCUSS/DSCN MKR DOCD: CPT | Performed by: INTERNAL MEDICINE

## 2023-08-18 RX ORDER — TAMSULOSIN HYDROCHLORIDE 0.4 MG/1
0.4 CAPSULE ORAL DAILY
COMMUNITY

## 2023-08-18 ASSESSMENT — PATIENT HEALTH QUESTIONNAIRE - PHQ9
7. TROUBLE CONCENTRATING ON THINGS, SUCH AS READING THE NEWSPAPER OR WATCHING TELEVISION: 1
2. FEELING DOWN, DEPRESSED OR HOPELESS: 0
3. TROUBLE FALLING OR STAYING ASLEEP: 0
SUM OF ALL RESPONSES TO PHQ QUESTIONS 1-9: 1
9. THOUGHTS THAT YOU WOULD BE BETTER OFF DEAD, OR OF HURTING YOURSELF: 0
4. FEELING TIRED OR HAVING LITTLE ENERGY: 0
SUM OF ALL RESPONSES TO PHQ9 QUESTIONS 1 & 2: 0
SUM OF ALL RESPONSES TO PHQ QUESTIONS 1-9: 1
10. IF YOU CHECKED OFF ANY PROBLEMS, HOW DIFFICULT HAVE THESE PROBLEMS MADE IT FOR YOU TO DO YOUR WORK, TAKE CARE OF THINGS AT HOME, OR GET ALONG WITH OTHER PEOPLE: 0
SUM OF ALL RESPONSES TO PHQ QUESTIONS 1-9: 1
6. FEELING BAD ABOUT YOURSELF - OR THAT YOU ARE A FAILURE OR HAVE LET YOURSELF OR YOUR FAMILY DOWN: 0
8. MOVING OR SPEAKING SO SLOWLY THAT OTHER PEOPLE COULD HAVE NOTICED. OR THE OPPOSITE, BEING SO FIGETY OR RESTLESS THAT YOU HAVE BEEN MOVING AROUND A LOT MORE THAN USUAL: 0
1. LITTLE INTEREST OR PLEASURE IN DOING THINGS: 0
5. POOR APPETITE OR OVEREATING: 0
SUM OF ALL RESPONSES TO PHQ QUESTIONS 1-9: 1

## 2023-08-18 ASSESSMENT — ANXIETY QUESTIONNAIRES
IF YOU CHECKED OFF ANY PROBLEMS ON THIS QUESTIONNAIRE, HOW DIFFICULT HAVE THESE PROBLEMS MADE IT FOR YOU TO DO YOUR WORK, TAKE CARE OF THINGS AT HOME, OR GET ALONG WITH OTHER PEOPLE: NOT DIFFICULT AT ALL
GAD7 TOTAL SCORE: 6
5. BEING SO RESTLESS THAT IT IS HARD TO SIT STILL: 1
1. FEELING NERVOUS, ANXIOUS, OR ON EDGE: 1
2. NOT BEING ABLE TO STOP OR CONTROL WORRYING: 1
7. FEELING AFRAID AS IF SOMETHING AWFUL MIGHT HAPPEN: 0
6. BECOMING EASILY ANNOYED OR IRRITABLE: 1
3. WORRYING TOO MUCH ABOUT DIFFERENT THINGS: 1
4. TROUBLE RELAXING: 1

## 2023-08-19 LAB
EST. AVERAGE GLUCOSE BLD GHB EST-MCNC: 142.7 MG/DL
HBA1C MFR BLD: 6.6 %

## 2024-01-22 PROBLEM — E11.9 TYPE 2 DIABETES MELLITUS, WITHOUT LONG-TERM CURRENT USE OF INSULIN (HCC): Status: ACTIVE | Noted: 2024-01-22

## 2024-01-22 NOTE — PROGRESS NOTES
[facility] [place]    1/30/2024    Referring Provider: Dany Nguyen MD    HISTORY:  Magdi Ayala is a 68 y.o. male presenting as a former patient of my partner, Dr. Haywood. He has a past medical history of CAD s/p CABG with TORREY ligation January 2021 with post-op pleural effusion s/p thoracentesis, HTN, and HLD.    Today he arrives alone and is ambulatory. He reports he gets aches in his left shoulder when it is cold outside. He said he does have some shoulder pain when putting his coat on.  Denies any exertional chest pain. Denies dizziness/lightheadedness and shortness of breath/dyspnea on exertion. He reports that he has no difficulty going up and down stairs. He had no difficulty walking from the parking lot to the office today. He reports that he has been compliant with his medications and tolerating them well. Only request today is for Tylenol as it helps with his shoulder pain. He is also asking about reducing the amount of medications he takes.       REVIEW OF SYSTEMS:  A complete review of systems was reviewed and is negative except as noted in the history of present illness.    Prior to Visit Medications    Medication Sig Taking? Authorizing Provider   acetaminophen (TYLENOL) 325 MG tablet Take 2 tablets by mouth every 6 hours as needed for Pain Yes Mendez Cid MD   aspirin 325 MG EC tablet TAKE 1 TABLET BY MOUTH EVERY DAY Yes Nisha Haywood N,    amLODIPine (NORVASC) 2.5 MG tablet TAKE 1 TABLET BY MOUTH EVERY DAY Yes Dany Nguyen MD   atorvastatin (LIPITOR) 40 MG tablet Take 1 tablet by mouth nightly Yes Dany Nguyen MD   metoprolol tartrate (LOPRESSOR) 50 MG tablet Take 1 tablet by mouth 2 times daily Yes Dany Nguyen MD   latanoprost (XALATAN) 0.005 % ophthalmic solution Place 1 drop into both eyes nightly Yes Provider, MD Jasmeet   tamsulosin (FLOMAX) 0.4 MG capsule Take 1 capsule by mouth daily  Patient not taking: Reported on 1/30/2024

## 2024-01-29 NOTE — PATIENT INSTRUCTIONS
Thank you for coming to see me today   Continue to be active     Take a half tablet of the lopresser (metoprolol) twice daily for one week. Then you will be okay to stop taking it altogether.     Keep an eye on your blood pressures to make sure it doesn't start going up since stopping the lopresser. Let us know if your blood pressures do start to increase.     Follow up with primary care about the shoulder pain

## 2024-01-30 ENCOUNTER — OFFICE VISIT (OUTPATIENT)
Dept: CARDIOLOGY CLINIC | Age: 69
End: 2024-01-30
Payer: COMMERCIAL

## 2024-01-30 VITALS
SYSTOLIC BLOOD PRESSURE: 116 MMHG | DIASTOLIC BLOOD PRESSURE: 70 MMHG | HEART RATE: 68 BPM | HEIGHT: 68 IN | OXYGEN SATURATION: 98 % | BODY MASS INDEX: 30.04 KG/M2 | WEIGHT: 198.2 LBS

## 2024-01-30 DIAGNOSIS — M25.512 CHRONIC LEFT SHOULDER PAIN: ICD-10-CM

## 2024-01-30 DIAGNOSIS — I25.810 CORONARY ARTERY DISEASE INVOLVING CORONARY BYPASS GRAFT OF NATIVE HEART WITHOUT ANGINA PECTORIS: Primary | ICD-10-CM

## 2024-01-30 DIAGNOSIS — E11.9 TYPE 2 DIABETES MELLITUS WITHOUT COMPLICATION, WITHOUT LONG-TERM CURRENT USE OF INSULIN (HCC): ICD-10-CM

## 2024-01-30 DIAGNOSIS — I10 ESSENTIAL HYPERTENSION: ICD-10-CM

## 2024-01-30 DIAGNOSIS — G89.29 CHRONIC LEFT SHOULDER PAIN: ICD-10-CM

## 2024-01-30 DIAGNOSIS — I48.0 PAF (PAROXYSMAL ATRIAL FIBRILLATION) (HCC): ICD-10-CM

## 2024-01-30 DIAGNOSIS — E78.5 DYSLIPIDEMIA: ICD-10-CM

## 2024-01-30 PROCEDURE — 93000 ELECTROCARDIOGRAM COMPLETE: CPT | Performed by: STUDENT IN AN ORGANIZED HEALTH CARE EDUCATION/TRAINING PROGRAM

## 2024-01-30 PROCEDURE — 3074F SYST BP LT 130 MM HG: CPT | Performed by: STUDENT IN AN ORGANIZED HEALTH CARE EDUCATION/TRAINING PROGRAM

## 2024-01-30 PROCEDURE — 3078F DIAST BP <80 MM HG: CPT | Performed by: STUDENT IN AN ORGANIZED HEALTH CARE EDUCATION/TRAINING PROGRAM

## 2024-01-30 PROCEDURE — 99214 OFFICE O/P EST MOD 30 MIN: CPT | Performed by: STUDENT IN AN ORGANIZED HEALTH CARE EDUCATION/TRAINING PROGRAM

## 2024-01-30 PROCEDURE — 1123F ACP DISCUSS/DSCN MKR DOCD: CPT | Performed by: STUDENT IN AN ORGANIZED HEALTH CARE EDUCATION/TRAINING PROGRAM

## 2024-01-30 RX ORDER — ACETAMINOPHEN 325 MG/1
650 TABLET ORAL EVERY 6 HOURS PRN
Qty: 120 TABLET | Refills: 0 | Status: SHIPPED | OUTPATIENT
Start: 2024-01-30

## 2024-04-17 ENCOUNTER — OFFICE VISIT (OUTPATIENT)
Dept: INTERNAL MEDICINE CLINIC | Age: 69
End: 2024-04-17
Payer: COMMERCIAL

## 2024-04-17 VITALS
TEMPERATURE: 97.9 F | OXYGEN SATURATION: 98 % | RESPIRATION RATE: 18 BRPM | HEIGHT: 68 IN | BODY MASS INDEX: 29.58 KG/M2 | DIASTOLIC BLOOD PRESSURE: 62 MMHG | HEART RATE: 75 BPM | WEIGHT: 195.2 LBS | SYSTOLIC BLOOD PRESSURE: 112 MMHG

## 2024-04-17 DIAGNOSIS — Z00.00 MEDICARE ANNUAL WELLNESS VISIT, SUBSEQUENT: Primary | ICD-10-CM

## 2024-04-17 DIAGNOSIS — I10 ESSENTIAL HYPERTENSION: ICD-10-CM

## 2024-04-17 DIAGNOSIS — E78.5 DYSLIPIDEMIA: ICD-10-CM

## 2024-04-17 DIAGNOSIS — E11.9 TYPE 2 DIABETES MELLITUS WITHOUT COMPLICATION, WITHOUT LONG-TERM CURRENT USE OF INSULIN (HCC): ICD-10-CM

## 2024-04-17 PROCEDURE — G0439 PPPS, SUBSEQ VISIT: HCPCS | Performed by: INTERNAL MEDICINE

## 2024-04-17 PROCEDURE — 1123F ACP DISCUSS/DSCN MKR DOCD: CPT | Performed by: INTERNAL MEDICINE

## 2024-04-17 PROCEDURE — 3074F SYST BP LT 130 MM HG: CPT | Performed by: INTERNAL MEDICINE

## 2024-04-17 PROCEDURE — 3078F DIAST BP <80 MM HG: CPT | Performed by: INTERNAL MEDICINE

## 2024-04-17 SDOH — ECONOMIC STABILITY: FOOD INSECURITY: WITHIN THE PAST 12 MONTHS, THE FOOD YOU BOUGHT JUST DIDN'T LAST AND YOU DIDN'T HAVE MONEY TO GET MORE.: NEVER TRUE

## 2024-04-17 SDOH — ECONOMIC STABILITY: FOOD INSECURITY: WITHIN THE PAST 12 MONTHS, YOU WORRIED THAT YOUR FOOD WOULD RUN OUT BEFORE YOU GOT MONEY TO BUY MORE.: NEVER TRUE

## 2024-04-17 SDOH — ECONOMIC STABILITY: HOUSING INSECURITY
IN THE LAST 12 MONTHS, WAS THERE A TIME WHEN YOU DID NOT HAVE A STEADY PLACE TO SLEEP OR SLEPT IN A SHELTER (INCLUDING NOW)?: NO

## 2024-04-17 SDOH — ECONOMIC STABILITY: INCOME INSECURITY: HOW HARD IS IT FOR YOU TO PAY FOR THE VERY BASICS LIKE FOOD, HOUSING, MEDICAL CARE, AND HEATING?: NOT HARD AT ALL

## 2024-04-17 ASSESSMENT — PATIENT HEALTH QUESTIONNAIRE - PHQ9
7. TROUBLE CONCENTRATING ON THINGS, SUCH AS READING THE NEWSPAPER OR WATCHING TELEVISION: NOT AT ALL
8. MOVING OR SPEAKING SO SLOWLY THAT OTHER PEOPLE COULD HAVE NOTICED. OR THE OPPOSITE, BEING SO FIGETY OR RESTLESS THAT YOU HAVE BEEN MOVING AROUND A LOT MORE THAN USUAL: NOT AT ALL
SUM OF ALL RESPONSES TO PHQ QUESTIONS 1-9: 1
9. THOUGHTS THAT YOU WOULD BE BETTER OFF DEAD, OR OF HURTING YOURSELF: NOT AT ALL
SUM OF ALL RESPONSES TO PHQ QUESTIONS 1-9: 1
4. FEELING TIRED OR HAVING LITTLE ENERGY: SEVERAL DAYS
SUM OF ALL RESPONSES TO PHQ9 QUESTIONS 1 & 2: 0
SUM OF ALL RESPONSES TO PHQ QUESTIONS 1-9: 1
5. POOR APPETITE OR OVEREATING: NOT AT ALL
2. FEELING DOWN, DEPRESSED OR HOPELESS: NOT AT ALL
3. TROUBLE FALLING OR STAYING ASLEEP: NOT AT ALL
10. IF YOU CHECKED OFF ANY PROBLEMS, HOW DIFFICULT HAVE THESE PROBLEMS MADE IT FOR YOU TO DO YOUR WORK, TAKE CARE OF THINGS AT HOME, OR GET ALONG WITH OTHER PEOPLE: NOT DIFFICULT AT ALL
1. LITTLE INTEREST OR PLEASURE IN DOING THINGS: NOT AT ALL
6. FEELING BAD ABOUT YOURSELF - OR THAT YOU ARE A FAILURE OR HAVE LET YOURSELF OR YOUR FAMILY DOWN: NOT AT ALL
SUM OF ALL RESPONSES TO PHQ QUESTIONS 1-9: 1

## 2024-04-17 ASSESSMENT — LIFESTYLE VARIABLES
HOW OFTEN DO YOU HAVE A DRINK CONTAINING ALCOHOL: NEVER
HOW MANY STANDARD DRINKS CONTAINING ALCOHOL DO YOU HAVE ON A TYPICAL DAY: PATIENT DOES NOT DRINK

## 2024-04-17 NOTE — PROGRESS NOTES
Medicare Annual Wellness Visit    Magdi Ayala is here for Medicare AWV    Assessment & Plan   Medicare annual wellness visit, subsequent  Type 2 diabetes mellitus without complication, without long-term current use of insulin (HCC)  -     Microalbumin / Creatinine Urine Ratio; Future  -     Hemoglobin A1C; Future  -     gave patient on portion plate  Dyslipidemia  -     Lipid Panel; Future  Essential hypertension  -     Comprehensive Metabolic Panel; Future    Recommendations for Preventive Services Due: see orders and patient instructions/AVS.  Recommended screening schedule for the next 5-10 years is provided to the patient in written form: see Patient Instructions/AVS.     No follow-ups on file.     Subjective   The following acute and/or chronic problems were also addressed today:  Patient has diabetes but is not taking any medications. He is on a lipid lowering therapy.  He  Would like information on healthy eating    Patient's complete Health Risk Assessment and screening values have been reviewed and are found in Flowsheets. The following problems were reviewed today and where indicated follow up appointments were made and/or referrals ordered.    Positive Risk Factor Screenings with Interventions:                Activity, Diet, and Weight:  On average, how many days per week do you engage in moderate to strenuous exercise (like a brisk walk)?: 0 days  On average, how many minutes do you engage in exercise at this level?: 0 min    Do you eat balanced/healthy meals regularly?: (!) No    Body mass index is 29.68 kg/m².      Inactivity Interventions:  Recommendations: walk 7,500 steps 4 days a week  Do you eat balanced/healthy meals regularly Interventions:  Gave information on eating more vegetables and less carbohydrates        Dentist Screen:  Have you seen the dentist within the past year?: (!) No    Intervention:  Advised to schedule with their dentist     Vision Screen:  Do you have difficulty driving,

## 2024-04-18 LAB
ALBUMIN SERPL-MCNC: 4.4 G/DL (ref 3.4–5)
ALBUMIN/GLOB SERPL: 1.7 {RATIO} (ref 1.1–2.2)
ALP SERPL-CCNC: 141 U/L (ref 40–129)
ALT SERPL-CCNC: 18 U/L (ref 10–40)
ANION GAP SERPL CALCULATED.3IONS-SCNC: 8 MMOL/L (ref 3–16)
AST SERPL-CCNC: 15 U/L (ref 15–37)
BILIRUB SERPL-MCNC: 0.5 MG/DL (ref 0–1)
BUN SERPL-MCNC: 16 MG/DL (ref 7–20)
CALCIUM SERPL-MCNC: 9.3 MG/DL (ref 8.3–10.6)
CHLORIDE SERPL-SCNC: 109 MMOL/L (ref 99–110)
CHOLEST SERPL-MCNC: 113 MG/DL (ref 0–199)
CO2 SERPL-SCNC: 29 MMOL/L (ref 21–32)
CREAT SERPL-MCNC: 1.1 MG/DL (ref 0.8–1.3)
GFR SERPLBLD CREATININE-BSD FMLA CKD-EPI: 73 ML/MIN/{1.73_M2}
GLUCOSE SERPL-MCNC: 147 MG/DL (ref 70–99)
HDLC SERPL-MCNC: 41 MG/DL (ref 40–60)
LDLC SERPL CALC-MCNC: 48 MG/DL
POTASSIUM SERPL-SCNC: 4.4 MMOL/L (ref 3.5–5.1)
PROT SERPL-MCNC: 7 G/DL (ref 6.4–8.2)
SODIUM SERPL-SCNC: 146 MMOL/L (ref 136–145)
TRIGL SERPL-MCNC: 118 MG/DL (ref 0–150)
VLDLC SERPL CALC-MCNC: 24 MG/DL

## 2024-04-19 LAB
CREAT UR-MCNC: 187.4 MG/DL (ref 39–259)
EST. AVERAGE GLUCOSE BLD GHB EST-MCNC: 145.6 MG/DL
HBA1C MFR BLD: 6.7 %
MICROALBUMIN UR DL<=1MG/L-MCNC: <1.2 MG/DL
MICROALBUMIN/CREAT UR: NORMAL MG/G (ref 0–30)

## 2024-05-06 ENCOUNTER — TELEPHONE (OUTPATIENT)
Dept: INTERNAL MEDICINE CLINIC | Age: 69
End: 2024-05-06

## 2024-05-06 DIAGNOSIS — E11.9 TYPE 2 DIABETES MELLITUS WITHOUT COMPLICATION, WITHOUT LONG-TERM CURRENT USE OF INSULIN (HCC): Primary | ICD-10-CM

## 2024-05-06 NOTE — TELEPHONE ENCOUNTER
The patient can bring his glucose down walking more and doing some additional exercise.  In addition he could cut back on his carbohydrates.  If he would like to meet with a dietitian, I am more than happy to refer him to our dietitian at the office.  This would be free.  Also, the patient can take a medication called metformin which would lower his glucose.

## 2024-05-13 DIAGNOSIS — I25.810 CORONARY ARTERY DISEASE INVOLVING CORONARY BYPASS GRAFT OF NATIVE HEART WITHOUT ANGINA PECTORIS: Primary | ICD-10-CM

## 2024-05-13 RX ORDER — ASPIRIN 81 MG/1
81 TABLET ORAL DAILY
Qty: 90 TABLET | Refills: 3 | Status: SHIPPED | OUTPATIENT
Start: 2024-05-13

## 2024-05-13 RX ORDER — ASPIRIN 325 MG
TABLET, DELAYED RELEASE (ENTERIC COATED) ORAL
Qty: 90 TABLET | Refills: 3 | OUTPATIENT
Start: 2024-05-13

## 2024-05-13 NOTE — TELEPHONE ENCOUNTER
Requested Prescriptions     Pending Prescriptions Disp Refills    aspirin 325 MG EC tablet [Pharmacy Med Name: ASPIRIN  MG TABLET] 90 tablet 3     Sig: TAKE 1 TABLET BY MOUTH EVERY DAY      [id number]     Last OV:  1/30/2024     Next OV: Visit date not found     Last Labs: 04/17/2024 LIPID    Last Filled: 05/15/2023 [facility]

## 2024-05-13 NOTE — TELEPHONE ENCOUNTER
BGC reviewed plan of care. Pt is currently on 325mg aspirin. He wants to switch patient to 81mg aspirin. New prescription sent to patient's pharmacy. Called patient to inform him of change. No answer. LMOM with information and callback number if he has questions.

## 2024-05-14 DIAGNOSIS — Z95.1 S/P CABG (CORONARY ARTERY BYPASS GRAFT): ICD-10-CM

## 2024-05-14 RX ORDER — METOPROLOL TARTRATE 50 MG/1
50 TABLET, FILM COATED ORAL 2 TIMES DAILY
Qty: 180 TABLET | Refills: 3 | Status: SHIPPED | OUTPATIENT
Start: 2024-05-14

## 2024-05-21 ENCOUNTER — NUTRITION (OUTPATIENT)
Dept: INTERNAL MEDICINE CLINIC | Age: 69
End: 2024-05-21

## 2024-05-21 NOTE — PROGRESS NOTES
Discussed the pathophysiology of type 2 diabetes, the meaning of HgbA1c, and associated risks with uncontrolled diabetes.     Discussed the MyPlate method, the importance of pairing carbohydrate with fats or proteins for blood glucose control, and reading the nutrition facts label. Discussed the difference between saturated and unsaturated fats and appropriate beverage choices.     Patient asked about foods to help lower his cholesterol. Discussed fiber, how it works to lower cholesterol and food sources high in fiber. Discussed the importance of limiting fat in the diet for not only cholesterol but also for preventing the progression of diabetes.    Briefly discussed weight loss and the benefits of weight loss such as blood glucose control, lowering cholesterol, and lowering blood pressure.       - Education Methods used: Verbal and Written  - Materials Provided:   1. Planning Healthy Meals.    2. Nutrition facts label.    3. Heart Healthy Fiber tips.     Individualized Treament Goals:  1. Add half cup of vegetables to dinner 3 times a week.   2. Drink 2 bottles of water a day.   3. Reach 7,000 steps a day.     Patient encouraged to call RD with any questions. RD available for follow up as needed.     Diego Mart RD   Contact Number: [phone]

## 2024-06-13 DIAGNOSIS — I31.39 PERICARDIAL EFFUSION: ICD-10-CM

## 2024-06-13 DIAGNOSIS — J90 PLEURAL EFFUSION: ICD-10-CM

## 2024-06-13 DIAGNOSIS — Z95.1 S/P CABG (CORONARY ARTERY BYPASS GRAFT): ICD-10-CM

## 2024-06-13 RX ORDER — AMLODIPINE BESYLATE 2.5 MG/1
TABLET ORAL
Qty: 90 TABLET | Refills: 3 | Status: SHIPPED | OUTPATIENT
Start: 2024-06-13

## 2024-06-13 RX ORDER — ATORVASTATIN CALCIUM 40 MG/1
40 TABLET, FILM COATED ORAL NIGHTLY
Qty: 90 TABLET | Refills: 3 | Status: SHIPPED | OUTPATIENT
Start: 2024-06-13

## 2024-06-17 ENCOUNTER — OFFICE VISIT (OUTPATIENT)
Dept: INTERNAL MEDICINE CLINIC | Age: 69
End: 2024-06-17
Payer: COMMERCIAL

## 2024-06-17 VITALS
HEIGHT: 68 IN | OXYGEN SATURATION: 98 % | RESPIRATION RATE: 18 BRPM | TEMPERATURE: 97.1 F | HEART RATE: 72 BPM | SYSTOLIC BLOOD PRESSURE: 122 MMHG | DIASTOLIC BLOOD PRESSURE: 62 MMHG | WEIGHT: 194.8 LBS | BODY MASS INDEX: 29.52 KG/M2

## 2024-06-17 DIAGNOSIS — K04.7 DENTAL INFECTION: ICD-10-CM

## 2024-06-17 DIAGNOSIS — R97.20 PSA ELEVATION: ICD-10-CM

## 2024-06-17 DIAGNOSIS — E11.9 TYPE 2 DIABETES MELLITUS WITHOUT COMPLICATION, WITHOUT LONG-TERM CURRENT USE OF INSULIN (HCC): Primary | ICD-10-CM

## 2024-06-17 DIAGNOSIS — E11.9 TYPE 2 DIABETES MELLITUS WITHOUT COMPLICATION, WITHOUT LONG-TERM CURRENT USE OF INSULIN (HCC): ICD-10-CM

## 2024-06-17 PROCEDURE — 3044F HG A1C LEVEL LT 7.0%: CPT | Performed by: INTERNAL MEDICINE

## 2024-06-17 PROCEDURE — 3078F DIAST BP <80 MM HG: CPT | Performed by: INTERNAL MEDICINE

## 2024-06-17 PROCEDURE — 1123F ACP DISCUSS/DSCN MKR DOCD: CPT | Performed by: INTERNAL MEDICINE

## 2024-06-17 PROCEDURE — 99214 OFFICE O/P EST MOD 30 MIN: CPT | Performed by: INTERNAL MEDICINE

## 2024-06-17 PROCEDURE — 3074F SYST BP LT 130 MM HG: CPT | Performed by: INTERNAL MEDICINE

## 2024-06-17 RX ORDER — AMOXICILLIN 875 MG/1
875 TABLET, COATED ORAL 2 TIMES DAILY
Qty: 20 TABLET | Refills: 0 | Status: SHIPPED | OUTPATIENT
Start: 2024-06-17 | End: 2024-06-27

## 2024-06-17 ASSESSMENT — PATIENT HEALTH QUESTIONNAIRE - PHQ9
SUM OF ALL RESPONSES TO PHQ QUESTIONS 1-9: 0
2. FEELING DOWN, DEPRESSED OR HOPELESS: NOT AT ALL
SUM OF ALL RESPONSES TO PHQ QUESTIONS 1-9: 0
SUM OF ALL RESPONSES TO PHQ9 QUESTIONS 1 & 2: 0
SUM OF ALL RESPONSES TO PHQ QUESTIONS 1-9: 0
SUM OF ALL RESPONSES TO PHQ QUESTIONS 1-9: 0
1. LITTLE INTEREST OR PLEASURE IN DOING THINGS: NOT AT ALL

## 2024-06-17 ASSESSMENT — ANXIETY QUESTIONNAIRES
1. FEELING NERVOUS, ANXIOUS, OR ON EDGE: NOT AT ALL
5. BEING SO RESTLESS THAT IT IS HARD TO SIT STILL: NOT AT ALL
2. NOT BEING ABLE TO STOP OR CONTROL WORRYING: NOT AT ALL
4. TROUBLE RELAXING: NOT AT ALL
3. WORRYING TOO MUCH ABOUT DIFFERENT THINGS: SEVERAL DAYS
GAD7 TOTAL SCORE: 1
6. BECOMING EASILY ANNOYED OR IRRITABLE: NOT AT ALL
7. FEELING AFRAID AS IF SOMETHING AWFUL MIGHT HAPPEN: NOT AT ALL
IF YOU CHECKED OFF ANY PROBLEMS ON THIS QUESTIONNAIRE, HOW DIFFICULT HAVE THESE PROBLEMS MADE IT FOR YOU TO DO YOUR WORK, TAKE CARE OF THINGS AT HOME, OR GET ALONG WITH OTHER PEOPLE: NOT DIFFICULT AT ALL

## 2024-06-17 NOTE — PROGRESS NOTES
oz)     BP Readings from Last 3 Encounters:   06/17/24 122/62   04/17/24 112/62   01/30/24 116/70     Body mass index is 29.62 kg/m². Facility age limit for growth %tricia is 20 years.   Physical Exam  Constitutional:       Appearance: Normal appearance. He is not ill-appearing.   HENT:      Mouth/Throat:      Dentition: Dental tenderness and dental caries present.     Eyes:      General:         Right eye: No discharge.         Left eye: No discharge.      Extraocular Movements: Extraocular movements intact.      Pupils: Pupils are equal, round, and reactive to light.   Neurological:      Mental Status: He is alert.          An electronic signature was used to authenticate this note.    --Dany Nguyen MD

## 2024-06-18 LAB
ALBUMIN SERPL-MCNC: 4.1 G/DL (ref 3.4–5)
ALBUMIN/GLOB SERPL: 1.6 {RATIO} (ref 1.1–2.2)
ALP SERPL-CCNC: 130 U/L (ref 40–129)
ALT SERPL-CCNC: 18 U/L (ref 10–40)
ANION GAP SERPL CALCULATED.3IONS-SCNC: 9 MMOL/L (ref 3–16)
AST SERPL-CCNC: 15 U/L (ref 15–37)
BILIRUB SERPL-MCNC: 0.5 MG/DL (ref 0–1)
BUN SERPL-MCNC: 11 MG/DL (ref 7–20)
CALCIUM SERPL-MCNC: 9.5 MG/DL (ref 8.3–10.6)
CHLORIDE SERPL-SCNC: 105 MMOL/L (ref 99–110)
CO2 SERPL-SCNC: 26 MMOL/L (ref 21–32)
CREAT SERPL-MCNC: 1 MG/DL (ref 0.8–1.3)
EST. AVERAGE GLUCOSE BLD GHB EST-MCNC: 139.9 MG/DL
GFR SERPLBLD CREATININE-BSD FMLA CKD-EPI: 82 ML/MIN/{1.73_M2}
GLUCOSE SERPL-MCNC: 122 MG/DL (ref 70–99)
HBA1C MFR BLD: 6.5 %
POTASSIUM SERPL-SCNC: 4.4 MMOL/L (ref 3.5–5.1)
PROT SERPL-MCNC: 6.7 G/DL (ref 6.4–8.2)
PSA SERPL DL<=0.01 NG/ML-MCNC: 6.39 NG/ML (ref 0–4)
SODIUM SERPL-SCNC: 140 MMOL/L (ref 136–145)

## 2024-09-17 ENCOUNTER — NUTRITION (OUTPATIENT)
Dept: INTERNAL MEDICINE CLINIC | Age: 69
End: 2024-09-17

## 2024-09-19 ENCOUNTER — OFFICE VISIT (OUTPATIENT)
Dept: INTERNAL MEDICINE CLINIC | Age: 69
End: 2024-09-19
Payer: COMMERCIAL

## 2024-09-19 VITALS
HEART RATE: 69 BPM | BODY MASS INDEX: 29.22 KG/M2 | RESPIRATION RATE: 18 BRPM | SYSTOLIC BLOOD PRESSURE: 118 MMHG | OXYGEN SATURATION: 99 % | TEMPERATURE: 97.2 F | DIASTOLIC BLOOD PRESSURE: 76 MMHG | HEIGHT: 68 IN | WEIGHT: 192.8 LBS

## 2024-09-19 DIAGNOSIS — E11.9 TYPE 2 DIABETES MELLITUS WITHOUT COMPLICATION, WITHOUT LONG-TERM CURRENT USE OF INSULIN (HCC): Primary | ICD-10-CM

## 2024-09-19 PROCEDURE — 99214 OFFICE O/P EST MOD 30 MIN: CPT | Performed by: INTERNAL MEDICINE

## 2024-09-19 PROCEDURE — 3044F HG A1C LEVEL LT 7.0%: CPT | Performed by: INTERNAL MEDICINE

## 2024-09-19 PROCEDURE — 3074F SYST BP LT 130 MM HG: CPT | Performed by: INTERNAL MEDICINE

## 2024-09-19 PROCEDURE — 1123F ACP DISCUSS/DSCN MKR DOCD: CPT | Performed by: INTERNAL MEDICINE

## 2024-09-19 PROCEDURE — 3078F DIAST BP <80 MM HG: CPT | Performed by: INTERNAL MEDICINE

## 2024-09-19 ASSESSMENT — PATIENT HEALTH QUESTIONNAIRE - PHQ9
SUM OF ALL RESPONSES TO PHQ9 QUESTIONS 1 & 2: 0
SUM OF ALL RESPONSES TO PHQ QUESTIONS 1-9: 0
1. LITTLE INTEREST OR PLEASURE IN DOING THINGS: NOT AT ALL
2. FEELING DOWN, DEPRESSED OR HOPELESS: NOT AT ALL

## 2024-09-19 ASSESSMENT — ANXIETY QUESTIONNAIRES
7. FEELING AFRAID AS IF SOMETHING AWFUL MIGHT HAPPEN: NOT AT ALL
1. FEELING NERVOUS, ANXIOUS, OR ON EDGE: NOT AT ALL
GAD7 TOTAL SCORE: 0
3. WORRYING TOO MUCH ABOUT DIFFERENT THINGS: NOT AT ALL
IF YOU CHECKED OFF ANY PROBLEMS ON THIS QUESTIONNAIRE, HOW DIFFICULT HAVE THESE PROBLEMS MADE IT FOR YOU TO DO YOUR WORK, TAKE CARE OF THINGS AT HOME, OR GET ALONG WITH OTHER PEOPLE: NOT DIFFICULT AT ALL
6. BECOMING EASILY ANNOYED OR IRRITABLE: NOT AT ALL
4. TROUBLE RELAXING: NOT AT ALL
5. BEING SO RESTLESS THAT IT IS HARD TO SIT STILL: NOT AT ALL
2. NOT BEING ABLE TO STOP OR CONTROL WORRYING: NOT AT ALL

## 2024-10-11 RX ORDER — ASPIRIN 325 MG
TABLET, DELAYED RELEASE (ENTERIC COATED) ORAL
Qty: 90 TABLET | Refills: 3 | Status: SHIPPED | OUTPATIENT
Start: 2024-10-11

## 2024-10-11 NOTE — TELEPHONE ENCOUNTER
Received refill request for Lucy Fairbanks  from [facility] pharmacy.     Last OV: 01/30/24    Next OV: 01/29/25    Last Labs: N/A    Last Filled: 01/30/24

## 2024-10-15 RX ORDER — ACETAMINOPHEN 325 MG/1
650 TABLET ORAL EVERY 6 HOURS PRN
Qty: 120 TABLET | Refills: 3 | OUTPATIENT
Start: 2024-10-15

## 2024-10-15 NOTE — TELEPHONE ENCOUNTER
Called pt to let him know to get Tylenol over the counter if needed. Pt verbalized understanding.

## 2024-12-12 ENCOUNTER — TELEPHONE (OUTPATIENT)
Dept: CARDIOLOGY CLINIC | Age: 69
End: 2024-12-12

## 2024-12-12 NOTE — TELEPHONE ENCOUNTER
Pt states he is having increased sob while doing normal tasks, walking, tieing shoes, etc. Denies chest pain or chest pressure, dizziness, but was concerned and he is not sure if he needs to come in right away.    Please advise.

## 2024-12-12 NOTE — TELEPHONE ENCOUNTER
Attempted to call pt again regarding symptoms. No answer. LMOM with callback number. Scheduled appt with NPTS tomorrow am per [facility].

## 2024-12-13 ENCOUNTER — OFFICE VISIT (OUTPATIENT)
Dept: CARDIOLOGY CLINIC | Age: 69
End: 2024-12-13
Payer: MEDICARE

## 2024-12-13 ENCOUNTER — TELEPHONE (OUTPATIENT)
Dept: CARDIOLOGY CLINIC | Age: 69
End: 2024-12-13

## 2024-12-13 VITALS
BODY MASS INDEX: 30.16 KG/M2 | OXYGEN SATURATION: 97 % | SYSTOLIC BLOOD PRESSURE: 114 MMHG | HEART RATE: 72 BPM | HEIGHT: 68 IN | WEIGHT: 199 LBS | DIASTOLIC BLOOD PRESSURE: 72 MMHG

## 2024-12-13 DIAGNOSIS — E78.2 MIXED HYPERLIPIDEMIA: ICD-10-CM

## 2024-12-13 DIAGNOSIS — I20.9 ANGINA PECTORIS (HCC): Primary | ICD-10-CM

## 2024-12-13 DIAGNOSIS — I25.810 CORONARY ARTERY DISEASE INVOLVING CORONARY BYPASS GRAFT OF NATIVE HEART WITHOUT ANGINA PECTORIS: ICD-10-CM

## 2024-12-13 DIAGNOSIS — I10 ESSENTIAL HYPERTENSION: ICD-10-CM

## 2024-12-13 PROCEDURE — G8417 CALC BMI ABV UP PARAM F/U: HCPCS | Performed by: NURSE PRACTITIONER

## 2024-12-13 PROCEDURE — G8484 FLU IMMUNIZE NO ADMIN: HCPCS | Performed by: NURSE PRACTITIONER

## 2024-12-13 PROCEDURE — 1159F MED LIST DOCD IN RCRD: CPT | Performed by: NURSE PRACTITIONER

## 2024-12-13 PROCEDURE — 99214 OFFICE O/P EST MOD 30 MIN: CPT | Performed by: NURSE PRACTITIONER

## 2024-12-13 PROCEDURE — 3017F COLORECTAL CA SCREEN DOC REV: CPT | Performed by: NURSE PRACTITIONER

## 2024-12-13 PROCEDURE — 3078F DIAST BP <80 MM HG: CPT | Performed by: NURSE PRACTITIONER

## 2024-12-13 PROCEDURE — 3074F SYST BP LT 130 MM HG: CPT | Performed by: NURSE PRACTITIONER

## 2024-12-13 PROCEDURE — 1123F ACP DISCUSS/DSCN MKR DOCD: CPT | Performed by: NURSE PRACTITIONER

## 2024-12-13 PROCEDURE — 1160F RVW MEDS BY RX/DR IN RCRD: CPT | Performed by: NURSE PRACTITIONER

## 2024-12-13 PROCEDURE — 1036F TOBACCO NON-USER: CPT | Performed by: NURSE PRACTITIONER

## 2024-12-13 PROCEDURE — G8427 DOCREV CUR MEDS BY ELIG CLIN: HCPCS | Performed by: NURSE PRACTITIONER

## 2024-12-13 NOTE — TELEPHONE ENCOUNTER
Patient saw Gemini Henley on 12/13/24 and needs a nuclear myoview stress test.  Patient's secondary insurance termed and patient only has Medicare.  His new insurance [employer] does is not effective until 1/1/25.  Patient will call our office to add new insurance after 1/1/25 and call Central scheduling to schedule test.

## 2024-12-13 NOTE — PROGRESS NOTES
murmurs, gallops, rubs, or abnormal heart sounds, normal PMI.The apical impulses not displaced; tenderness in chest wall with palpation   JVP less than 8 cm H2O  Heart tones are crisp and normal  Cervical veins are not engorged  The carotid upstroke is normal in amplitude and contour without delay or bruit  JVP is not elevated  ABDOMEN:  Normal bowel sounds, non-distended and non-tender to palpation  EXT: No edema, no calf tenderness. Pulses are present bilaterally.    DATA:    Lab Results   Component Value Date    ALT 18 2024    AST 15 2024    ALKPHOS 130 (H) 2024    BILITOT 0.5 2024     Lab Results   Component Value Date    CREATININE 1.0 2024    BUN 11 2024     2024    K 4.4 2024     2024    CO2 26 2024      Latest Reference Range & Units 24 11:33   Cholesterol, Total 0 - 199 mg/dL 113   HDL Cholesterol 40 - 60 mg/dL 41   LDL Cholesterol <100 mg/dL 48   Triglycerides 0 - 150 mg/dL 118   VLDL Not Established mg/dL 24     Radiology Review:  Pertinent images / reports were reviewed as a part of this visit and reveals the followin/7/21  Left Heart Cath  Dominance: Co      LM: short, MLI  LAD: 80% short, ostial stenosis; three moderate sized diagonals with 90% ostial to proximal  D2 and 70% ostial D3; 40% mid LAD disease  LCx: large first marginal free of significant disease; 85% eccentric mid stenosis followed by 40% proximal OM2   RCA: moderate sized vessel with 95% distal stenosis just prior to two posterolateral branches and RPDA; 90% mid acute marginal      LVEDP: 8 mmHg   LVEF: 65%       MCOT  - 3/03/2021- showed no atrial fibrillation, PVC< 1%, PAC <1%, high , Low HR 50, Average HR 77. Diary symptoms with Sinus Rhythm    Myoview: 22  Summary    Normal myocardial perfusion study.    Normal LV size and systolic function.         Echo: :  Summary   -Normal left ventricle size, mild concentric wall

## 2025-01-09 ENCOUNTER — HOSPITAL ENCOUNTER (OUTPATIENT)
Age: 70
Discharge: HOME | End: 2025-01-11
Payer: MEDICARE

## 2025-01-09 VITALS
HEIGHT: 67 IN | HEART RATE: 68 BPM | DIASTOLIC BLOOD PRESSURE: 75 MMHG | SYSTOLIC BLOOD PRESSURE: 120 MMHG | WEIGHT: 198 LBS | RESPIRATION RATE: 16 BRPM | BODY MASS INDEX: 31.08 KG/M2

## 2025-01-09 DIAGNOSIS — I25.810 CORONARY ARTERY DISEASE INVOLVING CORONARY BYPASS GRAFT OF NATIVE HEART WITHOUT ANGINA PECTORIS: ICD-10-CM

## 2025-01-09 DIAGNOSIS — I20.9 ANGINA PECTORIS (HCC): ICD-10-CM

## 2025-01-09 LAB
ECHO BSA: 2.06 M2
NUC STRESS EJECTION FRACTION: 70 %
NUC STRESS LV EDV: 93 ML (ref 67–155)
NUC STRESS LV ESV: 28 ML (ref 22–58)
NUC STRESS LV MASS: 134 G
STRESS BASELINE DIAS BP: 75 MMHG
STRESS BASELINE HR: 69 BPM
STRESS BASELINE SYS BP: 120 MMHG
STRESS ESTIMATED WORKLOAD: 4.6 METS
STRESS EXERCISE DUR MIN: 4 MIN
STRESS EXERCISE DUR SEC: 14 SEC
STRESS O2 SAT PEAK: 94 %
STRESS O2 SAT REST: 96 %
STRESS PEAK DIAS BP: 74 MMHG
STRESS PEAK SYS BP: 194 MMHG
STRESS PERCENT HR ACHIEVED: 89 %
STRESS POST PEAK HR: 134 BPM
STRESS RATE PRESSURE PRODUCT: NORMAL BPM*MMHG
STRESS TARGET HR: 151 BPM
TID: 1.04

## 2025-01-09 PROCEDURE — 3430000000 HC RX DIAGNOSTIC RADIOPHARMACEUTICAL: Performed by: NURSE PRACTITIONER

## 2025-01-09 PROCEDURE — A9502 TC99M TETROFOSMIN: HCPCS | Performed by: NURSE PRACTITIONER

## 2025-01-09 PROCEDURE — 78452 HT MUSCLE IMAGE SPECT MULT: CPT

## 2025-01-09 PROCEDURE — 93016 CV STRESS TEST SUPVJ ONLY: CPT | Performed by: INTERNAL MEDICINE

## 2025-01-09 PROCEDURE — 93017 CV STRESS TEST TRACING ONLY: CPT

## 2025-01-09 PROCEDURE — 78452 HT MUSCLE IMAGE SPECT MULT: CPT | Performed by: INTERNAL MEDICINE

## 2025-01-09 PROCEDURE — 93018 CV STRESS TEST I&R ONLY: CPT | Performed by: INTERNAL MEDICINE

## 2025-01-09 RX ADMIN — TETROFOSMIN 30.5 MILLICURIE: 1.38 INJECTION, POWDER, LYOPHILIZED, FOR SOLUTION INTRAVENOUS at 09:20

## 2025-01-09 RX ADMIN — TETROFOSMIN 10.4 MILLICURIE: 1.38 INJECTION, POWDER, LYOPHILIZED, FOR SOLUTION INTRAVENOUS at 08:13

## 2025-01-10 ENCOUNTER — TELEPHONE (OUTPATIENT)
Dept: CARDIOLOGY CLINIC | Age: 70
End: 2025-01-10

## 2025-02-19 NOTE — PROGRESS NOTES
[facility] [place]    2/20/2025    Referring Provider: Dany Nguyen MD    HISTORY:  Magdi Ayala is a 69 y.o. male presenting for routine follow up visit. He has a past medical history of CAD s/p CABG w/ TORREY ligation 1/2021 with post-op pleural effusion s/p thoracentesis, HTN, and HLD. He was doing well at his visit last year. His main concern was ongoing left shoulder pain when it is cold outside. He had a nuclear stress test since his last OV. The study was negative for myocardial ischemia. The findings suggest low risk of cardiac events.    Today he arrives alone and is ambulatory. He describes an aching sensation in his chest and a sticking sensation every time he turns his chest to the left. Denies exertional chest pain or any other discomfort outside of twisting. Denies dizziness/lightheadedness, shortness of breath/dyspnea on exertion. He is retired. He is able to do normal day to day activities without any cardiac limitation. Encouraged to start an exercise regimen. Does complain of unilateral left leg swelling and pain at vein harvest site      REVIEW OF SYSTEMS:  A complete review of systems was reviewed and is negative except as noted in the history of present illness.    Prior to Visit Medications    Medication Sig Taking? Authorizing Provider   aspirin 81 MG EC tablet Take 1 tablet by mouth daily Yes Mendez Cid MD   amLODIPine (NORVASC) 2.5 MG tablet TAKE 1 TABLET BY MOUTH EVERY DAY Yes Dany Nguyen MD   atorvastatin (LIPITOR) 40 MG tablet TAKE 1 TABLET BY MOUTH NIGHTLY Yes Dany Nguyen MD   metoprolol tartrate (LOPRESSOR) 50 MG tablet TAKE 1 TABLET BY MOUTH TWICE A DAY Yes Dany Nguyen MD   latanoprost (XALATAN) 0.005 % ophthalmic solution Place 1 drop into both eyes nightly Yes Provider, MD Jasmeet   acetaminophen (TYLENOL) 325 MG tablet Take 2 tablets by mouth every 6 hours as needed for Pain  Patient not taking: Reported on 12/13/2024  Jose Roberto

## 2025-02-19 NOTE — PATIENT INSTRUCTIONS
Thank you for coming to see me today     Continue to be active     Stop taking aspirin 325. Start aspirin 81mg daily     I encourage you to exercise for 150 minutes per week. You can work up to this.     Schedule echocardiogram    Schedule ultrasound of the left leg     You can talk to your PCP, Dr. Nguyen about other imaging of the chest

## 2025-02-20 ENCOUNTER — OFFICE VISIT (OUTPATIENT)
Dept: CARDIOLOGY CLINIC | Age: 70
End: 2025-02-20
Payer: MEDICARE

## 2025-02-20 VITALS
BODY MASS INDEX: 31.86 KG/M2 | WEIGHT: 203 LBS | DIASTOLIC BLOOD PRESSURE: 68 MMHG | SYSTOLIC BLOOD PRESSURE: 102 MMHG | OXYGEN SATURATION: 97 % | HEIGHT: 67 IN | HEART RATE: 70 BPM

## 2025-02-20 DIAGNOSIS — I10 ESSENTIAL HYPERTENSION: ICD-10-CM

## 2025-02-20 DIAGNOSIS — G89.29 CHRONIC LEFT SHOULDER PAIN: ICD-10-CM

## 2025-02-20 DIAGNOSIS — E78.2 MIXED HYPERLIPIDEMIA: ICD-10-CM

## 2025-02-20 DIAGNOSIS — E11.9 TYPE 2 DIABETES MELLITUS WITHOUT COMPLICATION, WITHOUT LONG-TERM CURRENT USE OF INSULIN (HCC): ICD-10-CM

## 2025-02-20 DIAGNOSIS — R06.02 SHORTNESS OF BREATH: ICD-10-CM

## 2025-02-20 DIAGNOSIS — I25.83 CORONARY ARTERY DISEASE DUE TO LIPID RICH PLAQUE: Primary | ICD-10-CM

## 2025-02-20 DIAGNOSIS — M25.512 CHRONIC LEFT SHOULDER PAIN: ICD-10-CM

## 2025-02-20 DIAGNOSIS — I48.0 PAF (PAROXYSMAL ATRIAL FIBRILLATION) (HCC): ICD-10-CM

## 2025-02-20 DIAGNOSIS — Z95.1 S/P CABG (CORONARY ARTERY BYPASS GRAFT): ICD-10-CM

## 2025-02-20 DIAGNOSIS — R07.9 CHEST PAIN, UNSPECIFIED TYPE: ICD-10-CM

## 2025-02-20 DIAGNOSIS — R60.9 SWELLING: ICD-10-CM

## 2025-02-20 DIAGNOSIS — I25.10 CORONARY ARTERY DISEASE DUE TO LIPID RICH PLAQUE: Primary | ICD-10-CM

## 2025-02-20 DIAGNOSIS — R60.0 LOCALIZED EDEMA: ICD-10-CM

## 2025-02-20 PROCEDURE — 3074F SYST BP LT 130 MM HG: CPT | Performed by: STUDENT IN AN ORGANIZED HEALTH CARE EDUCATION/TRAINING PROGRAM

## 2025-02-20 PROCEDURE — G8427 DOCREV CUR MEDS BY ELIG CLIN: HCPCS | Performed by: STUDENT IN AN ORGANIZED HEALTH CARE EDUCATION/TRAINING PROGRAM

## 2025-02-20 PROCEDURE — 1123F ACP DISCUSS/DSCN MKR DOCD: CPT | Performed by: STUDENT IN AN ORGANIZED HEALTH CARE EDUCATION/TRAINING PROGRAM

## 2025-02-20 PROCEDURE — 1036F TOBACCO NON-USER: CPT | Performed by: STUDENT IN AN ORGANIZED HEALTH CARE EDUCATION/TRAINING PROGRAM

## 2025-02-20 PROCEDURE — G8417 CALC BMI ABV UP PARAM F/U: HCPCS | Performed by: STUDENT IN AN ORGANIZED HEALTH CARE EDUCATION/TRAINING PROGRAM

## 2025-02-20 PROCEDURE — 3078F DIAST BP <80 MM HG: CPT | Performed by: STUDENT IN AN ORGANIZED HEALTH CARE EDUCATION/TRAINING PROGRAM

## 2025-02-20 PROCEDURE — 3017F COLORECTAL CA SCREEN DOC REV: CPT | Performed by: STUDENT IN AN ORGANIZED HEALTH CARE EDUCATION/TRAINING PROGRAM

## 2025-02-20 PROCEDURE — 99214 OFFICE O/P EST MOD 30 MIN: CPT | Performed by: STUDENT IN AN ORGANIZED HEALTH CARE EDUCATION/TRAINING PROGRAM

## 2025-02-20 PROCEDURE — 1159F MED LIST DOCD IN RCRD: CPT | Performed by: STUDENT IN AN ORGANIZED HEALTH CARE EDUCATION/TRAINING PROGRAM

## 2025-02-20 PROCEDURE — 2022F DILAT RTA XM EVC RTNOPTHY: CPT | Performed by: STUDENT IN AN ORGANIZED HEALTH CARE EDUCATION/TRAINING PROGRAM

## 2025-02-20 PROCEDURE — 3046F HEMOGLOBIN A1C LEVEL >9.0%: CPT | Performed by: STUDENT IN AN ORGANIZED HEALTH CARE EDUCATION/TRAINING PROGRAM

## 2025-02-20 PROCEDURE — G2211 COMPLEX E/M VISIT ADD ON: HCPCS | Performed by: STUDENT IN AN ORGANIZED HEALTH CARE EDUCATION/TRAINING PROGRAM

## 2025-02-20 RX ORDER — ASPIRIN 81 MG/1
81 TABLET ORAL DAILY
Qty: 90 TABLET | Refills: 3 | Status: SHIPPED | OUTPATIENT
Start: 2025-02-20

## 2025-03-14 ENCOUNTER — RESULTS FOLLOW-UP (OUTPATIENT)
Age: 70
End: 2025-03-14

## 2025-03-14 ENCOUNTER — RESULTS FOLLOW-UP (OUTPATIENT)
Dept: VASCULAR LAB | Age: 70
End: 2025-03-14

## 2025-03-14 ENCOUNTER — HOSPITAL ENCOUNTER (OUTPATIENT)
Age: 70
Discharge: HOME | End: 2025-03-16
Attending: STUDENT IN AN ORGANIZED HEALTH CARE EDUCATION/TRAINING PROGRAM
Payer: MEDICARE

## 2025-03-14 ENCOUNTER — HOSPITAL ENCOUNTER (OUTPATIENT)
Dept: VASCULAR LAB | Age: 70
Discharge: HOME | End: 2025-03-16
Attending: STUDENT IN AN ORGANIZED HEALTH CARE EDUCATION/TRAINING PROGRAM
Payer: MEDICARE

## 2025-03-14 VITALS
BODY MASS INDEX: 31.86 KG/M2 | HEIGHT: 67 IN | WEIGHT: 203 LBS | SYSTOLIC BLOOD PRESSURE: 119 MMHG | DIASTOLIC BLOOD PRESSURE: 68 MMHG

## 2025-03-14 DIAGNOSIS — R07.9 CHEST PAIN, UNSPECIFIED TYPE: ICD-10-CM

## 2025-03-14 DIAGNOSIS — R06.02 SHORTNESS OF BREATH: ICD-10-CM

## 2025-03-14 DIAGNOSIS — R60.9 SWELLING: ICD-10-CM

## 2025-03-14 DIAGNOSIS — R60.0 LOCALIZED EDEMA: ICD-10-CM

## 2025-03-14 LAB
ECHO AO ASC DIAM: 3.5 CM
ECHO AO ASCENDING AORTA INDEX: 1.72 CM/M2
ECHO AO ROOT DIAM: 3.1 CM
ECHO AO ROOT INDEX: 1.53 CM/M2
ECHO AV AREA PEAK VELOCITY: 2.3 CM2
ECHO AV AREA VTI: 2.5 CM2
ECHO AV AREA/BSA PEAK VELOCITY: 1.1 CM2/M2
ECHO AV AREA/BSA VTI: 1.2 CM2/M2
ECHO AV MEAN GRADIENT: 2 MMHG
ECHO AV MEAN VELOCITY: 0.7 M/S
ECHO AV PEAK GRADIENT: 5 MMHG
ECHO AV PEAK VELOCITY: 1.1 M/S
ECHO AV VELOCITY RATIO: 0.73
ECHO AV VTI: 21.6 CM
ECHO BSA: 2.09 M2
ECHO LA AREA 2C: 21.8 CM2
ECHO LA AREA 4C: 20 CM2
ECHO LA MAJOR AXIS: 5.7 CM
ECHO LA MINOR AXIS: 5.7 CM
ECHO LA VOL BP: 62 ML (ref 18–58)
ECHO LA VOL MOD A2C: 68 ML (ref 18–58)
ECHO LA VOL MOD A4C: 56 ML (ref 18–58)
ECHO LA VOL/BSA BIPLANE: 31 ML/M2 (ref 16–34)
ECHO LA VOLUME INDEX MOD A2C: 33 ML/M2 (ref 16–34)
ECHO LA VOLUME INDEX MOD A4C: 28 ML/M2 (ref 16–34)
ECHO LV E' LATERAL VELOCITY: 7.81 CM/S
ECHO LV E' SEPTAL VELOCITY: 6.68 CM/S
ECHO LV EDV A2C: 87 ML
ECHO LV EDV A4C: 76 ML
ECHO LV EDV INDEX A4C: 37 ML/M2
ECHO LV EDV NDEX A2C: 43 ML/M2
ECHO LV EF PHYSICIAN: 55 %
ECHO LV EJECTION FRACTION A2C: 63 %
ECHO LV EJECTION FRACTION A4C: 56 %
ECHO LV EJECTION FRACTION BIPLANE: 58 % (ref 55–100)
ECHO LV ESV A2C: 32 ML
ECHO LV ESV A4C: 34 ML
ECHO LV ESV INDEX A2C: 16 ML/M2
ECHO LV ESV INDEX A4C: 17 ML/M2
ECHO LV FRACTIONAL SHORTENING: 32 % (ref 28–44)
ECHO LV INTERNAL DIMENSION DIASTOLE INDEX: 2.32 CM/M2
ECHO LV INTERNAL DIMENSION DIASTOLIC: 4.7 CM (ref 4.2–5.9)
ECHO LV INTERNAL DIMENSION SYSTOLIC INDEX: 1.58 CM/M2
ECHO LV INTERNAL DIMENSION SYSTOLIC: 3.2 CM
ECHO LV IVSD: 0.8 CM (ref 0.6–1)
ECHO LV MASS 2D: 132.3 G (ref 88–224)
ECHO LV MASS INDEX 2D: 65.2 G/M2 (ref 49–115)
ECHO LV POSTERIOR WALL DIASTOLIC: 0.9 CM (ref 0.6–1)
ECHO LV RELATIVE WALL THICKNESS RATIO: 0.38
ECHO LVOT AREA: 3.1 CM2
ECHO LVOT AV VTI INDEX: 0.79
ECHO LVOT DIAM: 2 CM
ECHO LVOT MEAN GRADIENT: 1 MMHG
ECHO LVOT PEAK GRADIENT: 3 MMHG
ECHO LVOT PEAK VELOCITY: 0.8 M/S
ECHO LVOT STROKE VOLUME INDEX: 26.5 ML/M2
ECHO LVOT SV: 53.7 ML
ECHO LVOT VTI: 17.1 CM
ECHO MV A VELOCITY: 0.4 M/S
ECHO MV E DECELERATION TIME (DT): 199 MS
ECHO MV E VELOCITY: 0.69 M/S
ECHO MV E/A RATIO: 1.73
ECHO MV E/E' LATERAL: 8.83
ECHO MV E/E' RATIO (AVERAGED): 9.58
ECHO MV E/E' SEPTAL: 10.33
ECHO PV MAX VELOCITY: 0.8 M/S
ECHO PV PEAK GRADIENT: 2 MMHG
ECHO RA AREA 4C: 16.3 CM2
ECHO RA END SYSTOLIC VOLUME APICAL 4 CHAMBER INDEX BSA: 20 ML/M2
ECHO RA VOLUME: 41 ML
ECHO RV BASAL DIMENSION: 3.9 CM
ECHO RV FREE WALL PEAK S': 9.2 CM/S
ECHO RV LONGITUDINAL DIMENSION: 7.2 CM
ECHO RV MID DIMENSION: 2.9 CM
ECHO RV TAPSE: 1.9 CM (ref 1.7–?)
ECHO TV REGURGITANT MAX VELOCITY: 2.43 M/S
ECHO TV REGURGITANT PEAK GRADIENT: 24 MMHG

## 2025-03-14 PROCEDURE — 93306 TTE W/DOPPLER COMPLETE: CPT

## 2025-03-14 PROCEDURE — 93971 EXTREMITY STUDY: CPT | Performed by: INTERNAL MEDICINE

## 2025-03-14 PROCEDURE — 93971 EXTREMITY STUDY: CPT

## 2025-03-14 PROCEDURE — 93306 TTE W/DOPPLER COMPLETE: CPT | Performed by: STUDENT IN AN ORGANIZED HEALTH CARE EDUCATION/TRAINING PROGRAM

## 2025-03-14 NOTE — RESULT ENCOUNTER NOTE
Attempted to call to review nml echo results per [facility]. No answer. LMOM with callback.

## 2025-05-16 DIAGNOSIS — Z95.1 S/P CABG (CORONARY ARTERY BYPASS GRAFT): ICD-10-CM

## 2025-05-16 RX ORDER — METOPROLOL TARTRATE 50 MG
50 TABLET ORAL 2 TIMES DAILY
Qty: 180 TABLET | Refills: 3 | Status: SHIPPED | OUTPATIENT
Start: 2025-05-16

## 2025-06-04 DIAGNOSIS — I31.39 PERICARDIAL EFFUSION: ICD-10-CM

## 2025-06-04 DIAGNOSIS — Z95.1 S/P CABG (CORONARY ARTERY BYPASS GRAFT): ICD-10-CM

## 2025-06-04 DIAGNOSIS — J90 PLEURAL EFFUSION: ICD-10-CM

## 2025-06-04 RX ORDER — AMLODIPINE BESYLATE 2.5 MG/1
2.5 TABLET ORAL DAILY
Qty: 90 TABLET | Refills: 3 | Status: SHIPPED | OUTPATIENT
Start: 2025-06-04

## 2025-07-09 ENCOUNTER — TELEPHONE (OUTPATIENT)
Dept: INTERNAL MEDICINE CLINIC | Age: 70
End: 2025-07-09

## 2025-07-09 NOTE — TELEPHONE ENCOUNTER
----- Message from Bernardo SAVAGE sent at 7/9/2025  4:20 PM EDT -----  Regarding: ECC Escalation To Practice  ECC Escalation To Practice      Type of Escalation: Red Flag Symptom  --------------------------------------------------------------------------------------------------------------------------    Information for Provider:  Patient is looking for appointment for: Symptom Left Arm Pain  Reasons for Message: Unable to reach practice     Additional Information Patient would like to book an appointment as soon as possible.  --------------------------------------------------------------------------------------------------------------------------    Relationship to Patient: Self  Call Back Info: OK to leave message on voicemail  Preferred Call Back Number: Phone [phone]

## 2025-07-10 NOTE — TELEPHONE ENCOUNTER
Spoke with patient. Advised he was seen in Urgent Care. Per Urgent Care he has a pinched nerve and was given medication. Patient will call if there are any other concerns.

## 2025-08-12 DIAGNOSIS — Z95.1 S/P CABG (CORONARY ARTERY BYPASS GRAFT): ICD-10-CM

## 2025-08-12 RX ORDER — ATORVASTATIN CALCIUM 40 MG/1
40 TABLET, FILM COATED ORAL NIGHTLY
Qty: 90 TABLET | Refills: 3 | Status: SHIPPED | OUTPATIENT
Start: 2025-08-12

## (undated) DEVICE — EZE-BAND LF ST 4X5.5 36/CS: Brand: EZE-BAND LF ST 4X5.5 36/CS

## (undated) DEVICE — SET PERF L15IN BLU CLMP MULT FEM LUER ON SGL INLET LEG DLP

## (undated) DEVICE — [HIGH FLOW INSUFFLATOR,  DO NOT USE IF PACKAGE IS DAMAGED,  KEEP DRY,  KEEP AWAY FROM SUNLIGHT,  PROTECT FROM HEAT AND RADIOACTIVE SOURCES.]: Brand: PNEUMOSURE

## (undated) DEVICE — CATH CTR VEN 3LUM INTRLNK INJ 9FRX11CM

## (undated) DEVICE — MONITOR LN W LUER LOK 72

## (undated) DEVICE — FAIRFIELD CABG ACCESSARY PK

## (undated) DEVICE — SUTURE NONABSORBABLE MONOFILAMENT 4-0 RB-1 36 IN BLU PROLENE 8557H

## (undated) DEVICE — SUTURE VCRL SZ 4-0 L18IN ABSRB UD L19MM PS-2 3/8 CIR PRIM J496H

## (undated) DEVICE — SUTURE ETHBND SZ 3-0 L30IN NONABSORBABLE GRN SH L26MM 1/2 X562H

## (undated) DEVICE — SYRINGE, LUER LOCK, 10ML: Brand: MEDLINE

## (undated) DEVICE — GOWN SIRUS NONREIN XL W/TWL: Brand: MEDLINE INDUSTRIES, INC.

## (undated) DEVICE — SUTURE PERMAHAND SZ 2-0 L30IN NONABSORBABLE BLK SILK W/O A305H

## (undated) DEVICE — CANNULA ART 24FR OVERALL L105IN VENT CONN 3 8IN MTL TIP W

## (undated) DEVICE — APPLICATOR MEDICATED 3 CC SOLUTION CLR STRL CHLORAPREP

## (undated) DEVICE — BLADE ES ELASTOMERIC COAT INSUL DURABLE BEND UPTO 90DEG

## (undated) DEVICE — GAUZE,SPONGE,4"X4",8PLY,STRL,LF,10/TRAY: Brand: MEDLINE

## (undated) DEVICE — SUTURE PERMA-HAND SZ 2 L60IN NONABSORBABLE BLK SILK BRAID SA8H

## (undated) DEVICE — BLADE RETRCT 3 SH FNGR ASST

## (undated) DEVICE — SYRINGE MED 20ML STD CLR PLAS LUERLOCK TIP N CTRL DISP

## (undated) DEVICE — SUTURE VCRL SZ 3-0 L27IN ABSRB UD L26MM SH 1/2 CIR J416H

## (undated) DEVICE — 3 ML SYRINGE LUER-LOCK TIP: Brand: MONOJECT

## (undated) DEVICE — BOWL MED L 32OZ PLAS W/ MOLD GRAD EZ OPN PEEL PCH

## (undated) DEVICE — LABEL MED CUST CVR

## (undated) DEVICE — 3M™ TEGADERM™ TRANSPARENT FILM DRESSING FRAME STYLE, 1626W, 4 IN X 4-3/4 IN (10 CM X 12 CM), 50/CT 4CT/CASE: Brand: 3M™ TEGADERM™

## (undated) DEVICE — CONTAINER STOR SURG SLUSH

## (undated) DEVICE — SYRINGE, LUER LOCK, 30ML: Brand: MEDLINE

## (undated) DEVICE — SWAN-GANZ THERMODILUTION CATHETER: Brand: SWAN-GANZ

## (undated) DEVICE — TTL1LYR 16FR10ML 100%SIL TMPST TR: Brand: MEDLINE

## (undated) DEVICE — CATHETER ETER IV 22GA L1IN POLYUR STR RADPQ INTROCAN SFTY

## (undated) DEVICE — APPLICATOR PREP 26ML 0.7% IOD POVACRYLEX 74% ISO ALC ST

## (undated) DEVICE — EVERGRIP INSERT SET 61MM: Brand: FOGARTY EVERGRIP

## (undated) DEVICE — BLADE ES L4IN INSUL EDGE

## (undated) DEVICE — GLOVE SURG SZ 75 L12IN FNGR THK94MIL STD WHT LTX FREE

## (undated) DEVICE — AORTIC PNCH 4.0MM 8IN BX 10 DISP

## (undated) DEVICE — APPLIER LIG CLP M L11IN TI STR RNG HNDL FOR 20 CLP DISP

## (undated) DEVICE — OPEN HRT BASIC PK

## (undated) DEVICE — TUBING, SUCTION, 1/4" X 10', STRAIGHT: Brand: MEDLINE

## (undated) DEVICE — OPTIFOAM GENTLE LIQUITRAP, SACRUM, 7"X7": Brand: MEDLINE

## (undated) DEVICE — SYRINGE, LUER LOCK, 60ML: Brand: MEDLINE

## (undated) DEVICE — DECANTER FLD 9IN ST BG FOR ASEP TRNSF OF FLD

## (undated) DEVICE — MERCY FAIRFIELD TURNOVER KIT: Brand: MEDLINE INDUSTRIES, INC.

## (undated) DEVICE — SUTURE SZ 7 L18IN NONABSORBABLE SIL CCS L48MM 1/2 CIR STRNM M655G

## (undated) DEVICE — GLOVE SURG SZ 7 L11.33IN FNGR THK9.8MIL STRW LTX POLYMER

## (undated) DEVICE — NEEDLE HYPO 18GA L1.5IN THN WALL PIVOTING SHLD BVL ORIENTED

## (undated) DEVICE — SYSTEM ENDOSCP VES HARV W/ TOOL CANN SEAL SHT PRT BLNT TIP

## (undated) DEVICE — BLANKET WRM CARD FOR MISTRAL AIR WRM SYS

## (undated) DEVICE — SUTURE ETHBND EXCEL SZ 2-0 L36IN NONABSORBABLE GRN SH-2 X559H

## (undated) DEVICE — DRESSING GERM DIA1IN CNTR H DIA7MM BLU CHG ANTIMIC PROTCT

## (undated) DEVICE — SUTURE NONABSORBABLE MONOFILAMENT 6-0 C-1 1X30 IN PROLENE 8706H

## (undated) DEVICE — BLADE OPHTH 180DEG CUT SURF BLU STR SHRP DBL BVL GRINDLESS

## (undated) DEVICE — SUTURE PROL SZ 7-0 L24IN NONABSORBABLE BLU L8MM BV175-6 3/8 8735H

## (undated) DEVICE — APPLIER CLP L9.375IN APER 2.1MM CLS L3.8MM 20 SM TI CLP

## (undated) DEVICE — LEAD PACE L475MM CHNL A OR V MYOCARDIAL STEROID ELUT SIL

## (undated) DEVICE — SET TRNQT W/ STD 7IN 12FR 5 TB 1 SNR DLP

## (undated) DEVICE — PAD THRM M SPL TORSO

## (undated) DEVICE — GLOVE ORTHO 7 1/2   MSG9475

## (undated) DEVICE — BANDAGE COMPR W6INXL10YD ST M E WHITE/BEIGE

## (undated) DEVICE — BLADE SURG NO12 S STL STR DISP GLASSVAN

## (undated) DEVICE — HYPODERMIC SAFETY NEEDLE: Brand: MAGELLAN

## (undated) DEVICE — KIT ART LN 20GA L12CM FEP RADPQ 0.025X13.75IN SPR GWIRE

## (undated) DEVICE — INDICATED FOR SURGICAL CLAMPING DURING CARDIOVASCULAR PERIPHERAL VASCULAR, AND GENERAL SURGERY.: Brand: SOFT/FIBRA® SPRING CLIP

## (undated) DEVICE — SUTURE PROL SZ 8-0 L24IN NONABSORBABLE BLU L6.5MM BV130-5 8732H

## (undated) DEVICE — SUTURE VCRL SZ 2-0 L36IN ABSRB UD L36MM CT-1 1/2 CIR J945H

## (undated) DEVICE — SUTURE NONABSORBABLE MONOFILAMENT 5-0 C-1 1X24 IN PROLENE 8725H

## (undated) DEVICE — SUTURE ETHBND EXCEL SZ 0 L18IN NONABSORBABLE GRN L36MM CT-1 CX21D

## (undated) DEVICE — INTENDED FOR TISSUE SEPARATION, AND OTHER PROCEDURES THAT REQUIRE A SHARP SURGICAL BLADE TO PUNCTURE OR CUT.: Brand: BARD-PARKER ® STAINLESS STEEL BLADES

## (undated) DEVICE — NEEDLE SPNL L3.5IN PNK HUB S STL REG WALL FIT STYL W/ QNCKE

## (undated) DEVICE — GEL US 20GM NONIRRITATING OVERWRAPPED FILE PCH TRNSMIT

## (undated) DEVICE — SPONGE LAP W18XL18IN WHT COT 4 PLY FLD STRUNG RADPQ DISP ST

## (undated) DEVICE — ELECTRODE PT RET AD L9FT HI MOIST COND ADH HYDRGEL CORDED

## (undated) DEVICE — DRESSING WND SM W2.5XL4IN BRTH W/ CATH SECUREMENT AND

## (undated) DEVICE — EXTENSION SET, 2 INJECTION SITES, MALE LUER LOCK ADAPTER WITH RETRACTABLE COLLAR: Brand: INTERLINK

## (undated) DEVICE — STERNUM BLADE, OFFSET (31.7 X 0.64 X 6.3MM)

## (undated) DEVICE — BASIC SINGLE BASIN 1-LF: Brand: MEDLINE INDUSTRIES, INC.

## (undated) DEVICE — CANNULA PERF 7FR L5.5IN AORT ROOT RADPQ STD TIP W/ VENT LN

## (undated) DEVICE — PRESSURE MONITORING SET: Brand: TRUWAVE, VAMP PLUS

## (undated) DEVICE — 12 FOOT DISPOSABLE EXTENSION CABLE WITH SAFE CONNECT / SCREW-DOWN

## (undated) DEVICE — CANNULA PERF L15IN DIA29FR VEN 3 STG THN WALL DSGN W  VENT

## (undated) DEVICE — PRESSURE TUBING: Brand: TRUWAVE

## (undated) DEVICE — SUTURE PERMA-HAND SZ 4-0 L144IN NONABSORBABLE BLK LIGAPAK LA53G

## (undated) DEVICE — DRAPE THER FLUID WARMING 66X44 IN FLAT SLUSH DBL DISC ORS

## (undated) DEVICE — CONNECTOR PERF W3/8XH0.25XL0.25IN BASE UNEQUAL Y SHP W/O

## (undated) DEVICE — SYSTEM SKIN CLSR 22CM DERMBND PRINEO

## (undated) DEVICE — DRAIN SURG SGL COLL PT TB FOR ATS BG OASIS

## (undated) DEVICE — NEEDLE HYPO 27GA L0.5IN GRY POLYPR HUB S STL REG BVL STR

## (undated) DEVICE — WAX SURG 2.5GM HEMSTAT BNE BEESWAX PARAFFIN ISO PALMITATE

## (undated) DEVICE — DRAIN SURG 24FR BLAK SIL HUBLESS 4 CHANNELED RADPQ EXTN TB

## (undated) DEVICE — PROCEDURE KIT COMP

## (undated) DEVICE — DRESSING TRNSPAR W FAB BORD SORBAVIEW ULT 475X425IN

## (undated) DEVICE — SUTURE PDS II SZ 0 L27IN ABSRB VLT L36MM CT-1 1/2 CIR Z340H

## (undated) DEVICE — TOWEL 26X17IN 2 PER PACK COTTON DELINTED